# Patient Record
Sex: MALE | Race: WHITE | NOT HISPANIC OR LATINO
[De-identification: names, ages, dates, MRNs, and addresses within clinical notes are randomized per-mention and may not be internally consistent; named-entity substitution may affect disease eponyms.]

---

## 2019-10-03 ENCOUNTER — APPOINTMENT (OUTPATIENT)
Dept: CARDIOLOGY | Facility: CLINIC | Age: 84
End: 2019-10-03

## 2019-10-24 ENCOUNTER — APPOINTMENT (OUTPATIENT)
Dept: CARDIOLOGY | Facility: CLINIC | Age: 84
End: 2019-10-24

## 2020-12-13 ENCOUNTER — EMERGENCY (EMERGENCY)
Facility: HOSPITAL | Age: 85
LOS: 1 days | Discharge: HOME | End: 2020-12-13
Attending: EMERGENCY MEDICINE | Admitting: EMERGENCY MEDICINE
Payer: MEDICARE

## 2020-12-13 VITALS
RESPIRATION RATE: 15 BRPM | HEIGHT: 68 IN | DIASTOLIC BLOOD PRESSURE: 81 MMHG | OXYGEN SATURATION: 97 % | TEMPERATURE: 98 F | SYSTOLIC BLOOD PRESSURE: 140 MMHG | WEIGHT: 130.07 LBS | HEART RATE: 113 BPM

## 2020-12-13 VITALS
TEMPERATURE: 100 F | RESPIRATION RATE: 18 BRPM | HEART RATE: 107 BPM | SYSTOLIC BLOOD PRESSURE: 138 MMHG | DIASTOLIC BLOOD PRESSURE: 76 MMHG | OXYGEN SATURATION: 99 %

## 2020-12-13 PROCEDURE — 99283 EMERGENCY DEPT VISIT LOW MDM: CPT | Mod: CS

## 2020-12-13 RX ORDER — ACETAMINOPHEN 500 MG
650 TABLET ORAL ONCE
Refills: 0 | Status: COMPLETED | OUTPATIENT
Start: 2020-12-13 | End: 2020-12-13

## 2020-12-13 RX ADMIN — Medication 650 MILLIGRAM(S): at 06:16

## 2020-12-13 RX ADMIN — Medication 650 MILLIGRAM(S): at 06:46

## 2020-12-13 NOTE — ED ADULT NURSE NOTE - NSIMPLEMENTINTERV_GEN_ALL_ED
Implemented All Fall Risk Interventions:  [place] to call system. Call bell, personal items and telephone within reach. Instruct patient to call for assistance. Room bathroom lighting operational. Non-slip footwear when patient is off stretcher. Physically safe environment: no spills, clutter or unnecessary equipment. Stretcher in lowest position, wheels locked, appropriate side rails in place. Provide visual cue, wrist band, yellow gown, etc. Monitor gait and stability. Monitor for mental status changes and reorient to person, place, and time. Review medications for side effects contributing to fall risk. Reinforce activity limits and safety measures with patient and family.

## 2020-12-13 NOTE — ED PROVIDER NOTE - PATIENT PORTAL LINK FT
You can access the FollowMyHealth Patient Portal offered by [facility] by registering at the following website: [email or web address]. By joining [facility]’s FollowMyHealth portal, you will also be able to view your health information using other applications (apps) compatible with our system.

## 2020-12-13 NOTE — ED PROVIDER NOTE - OBJECTIVE STATEMENT
89yo male bib ems s/p fall. pt was walking to the kitchen and slipped and fell on his buttocks no head trauma no LOC< pt denies any pain, pt states his wife came home from rehab and the entire family has not tested positive for covid, pt only c/o nasal congestion, no sob, no fever, no cough, no other complaints, pt ambulates with a cane at home

## 2020-12-13 NOTE — ED ADULT NURSE NOTE - OBJECTIVE STATEMENT
Patient from home BIBEMS c/o slip and fall at home on the kitchen floor , pt denies pain, denies hitting head, denies LOC. Pt is Covid+, 101.9 rectal temperature. pt denies dizziness.  No abrasion/deformity noted upon assessment.  Upon assessment blanchable redness on bilateral gluteal fold noted.

## 2020-12-13 NOTE — ED ADULT NURSE REASSESSMENT NOTE - NS ED NURSE REASSESS COMMENT FT1
patient agreed to voucher cab, pt states he feels better.
patient ambulated in room with cane and 1 assist, pt ambulated well no falling/loss of balance.
Family made aware of pt's D/C.  Awaiting his return.  Daughter/wife.   Pt dressed in hospital scrubs.  D/C via taxi that was waiting during report.

## 2020-12-13 NOTE — ED PROVIDER NOTE - NSFOLLOWUPINSTRUCTIONS_ED_ALL_ED_FT
Fall Prevention    WHAT YOU NEED TO KNOW:    Fall prevention includes ways to make your home and other areas safer. It also includes ways you can move more carefully to prevent a fall. Health conditions that cause changes in your blood pressure, vision, or muscle strength and coordination may increase your risk for falls. Medicines may also increase your risk for falls if they make you dizzy, weak, or sleepy.    DISCHARGE INSTRUCTIONS:    Call 911 or have someone else call if:   •You have fallen and are unconscious.      •You have fallen and cannot move part of your body.      Contact your healthcare provider if:   •You have fallen and have pain or a headache.      •You have questions or concerns about your condition or care.      Fall prevention tips:   •Stand or sit up slowly. This may help you keep your balance and prevent falls.      •Use assistive devices as directed. Your healthcare provider may suggest that you use a cane or walker to help you keep your balance. You may need to have grab bars put in your bathroom near the toilet or in the shower.      •Wear shoes that fit well and have soles that . Wear shoes both inside and outside. Use slippers with good . Do not wear shoes with high heels.      •Wear a personal alarm. This is a device that allows you to call 911 if you fall and need help. Ask your healthcare provider for more information.      •Stay active. Exercise can help strengthen your muscles and improve your balance. Your healthcare provider may recommend water aerobics or walking. He or she may also recommend physical therapy to improve your coordination. Never start an exercise program without talking to your healthcare provider first.  Walking for Exercise           •Manage your medical conditions.  Keep all appointments with your healthcare providers. Visit your eye doctor as directed.      Home safety tips:     Fall Prevention for Adults     •Add items to prevent falls in the bathroom. Put nonslip strips on your bath or shower floor to prevent you from slipping. Use a bath mat if you do not have carpet in the bathroom. This will prevent you from falling when you step out of the bath or shower. Use a shower seat so you do not need to stand while you shower. Sit on the toilet or a chair in your bathroom to dry yourself and put on clothing. This will prevent you from losing your balance from drying or dressing yourself while you are standing.      •Keep paths clear. Remove books, shoes, and other objects from walkways and stairs. Place cords for telephones and lamps out of the way so that you do not need to walk over them. Tape them down if you cannot move them. Remove small rugs. If you cannot remove a rug, secure it with double-sided tape. This will prevent you from tripping.      •Install bright lights in your home. Use night lights to help light paths to the bathroom or kitchen. Always turn on the light before you start walking.      •Keep items you use often on shelves within reach. Do not use a step stool to help you reach an item.      •Paint or place reflective tape on the edges of your stairs. This will help you see the stairs better.      Follow up with your healthcare provider as directed: Write down your questions so you remember to ask them during your visits

## 2020-12-14 ENCOUNTER — INPATIENT (INPATIENT)
Facility: HOSPITAL | Age: 85
LOS: 12 days | DRG: 177 | End: 2020-12-27
Attending: FAMILY MEDICINE | Admitting: FAMILY MEDICINE
Payer: MEDICARE

## 2020-12-14 VITALS
OXYGEN SATURATION: 97 % | DIASTOLIC BLOOD PRESSURE: 83 MMHG | HEART RATE: 97 BPM | HEIGHT: 68 IN | SYSTOLIC BLOOD PRESSURE: 159 MMHG | TEMPERATURE: 99 F | RESPIRATION RATE: 20 BRPM

## 2020-12-14 DIAGNOSIS — E78.5 HYPERLIPIDEMIA, UNSPECIFIED: ICD-10-CM

## 2020-12-14 DIAGNOSIS — E11.69 TYPE 2 DIABETES MELLITUS WITH OTHER SPECIFIED COMPLICATION: ICD-10-CM

## 2020-12-14 DIAGNOSIS — M10.9 GOUT, UNSPECIFIED: ICD-10-CM

## 2020-12-14 DIAGNOSIS — U07.1 COVID-19: ICD-10-CM

## 2020-12-14 DIAGNOSIS — I10 ESSENTIAL (PRIMARY) HYPERTENSION: ICD-10-CM

## 2020-12-14 LAB
ALBUMIN SERPL ELPH-MCNC: 3 G/DL — LOW (ref 3.3–5)
ALP SERPL-CCNC: 129 U/L — HIGH (ref 40–120)
ALT FLD-CCNC: 28 U/L — SIGNIFICANT CHANGE UP (ref 12–78)
ANION GAP SERPL CALC-SCNC: 12 MMOL/L — SIGNIFICANT CHANGE UP (ref 5–17)
APPEARANCE UR: CLEAR — SIGNIFICANT CHANGE UP
APTT BLD: 30 SEC — SIGNIFICANT CHANGE UP (ref 27.5–35.5)
AST SERPL-CCNC: 28 U/L — SIGNIFICANT CHANGE UP (ref 15–37)
BACTERIA # UR AUTO: ABNORMAL
BASOPHILS # BLD AUTO: 0.01 K/UL — SIGNIFICANT CHANGE UP (ref 0–0.2)
BASOPHILS NFR BLD AUTO: 0.2 % — SIGNIFICANT CHANGE UP (ref 0–2)
BILIRUB SERPL-MCNC: 1.4 MG/DL — HIGH (ref 0.2–1.2)
BILIRUB UR-MCNC: NEGATIVE — SIGNIFICANT CHANGE UP
BUN SERPL-MCNC: 22 MG/DL — SIGNIFICANT CHANGE UP (ref 7–23)
CALCIUM SERPL-MCNC: 8.5 MG/DL — SIGNIFICANT CHANGE UP (ref 8.5–10.1)
CHLORIDE SERPL-SCNC: 102 MMOL/L — SIGNIFICANT CHANGE UP (ref 96–108)
CK SERPL-CCNC: 273 U/L — SIGNIFICANT CHANGE UP (ref 26–308)
CO2 SERPL-SCNC: 23 MMOL/L — SIGNIFICANT CHANGE UP (ref 22–31)
COLOR SPEC: YELLOW — SIGNIFICANT CHANGE UP
CREAT SERPL-MCNC: 1.4 MG/DL — HIGH (ref 0.5–1.3)
CRP SERPL-MCNC: 4.96 MG/DL — HIGH (ref 0–0.4)
D DIMER BLD IA.RAPID-MCNC: 3136 NG/ML DDU — HIGH
DIFF PNL FLD: ABNORMAL
EOSINOPHIL # BLD AUTO: 0 K/UL — SIGNIFICANT CHANGE UP (ref 0–0.5)
EOSINOPHIL NFR BLD AUTO: 0 % — SIGNIFICANT CHANGE UP (ref 0–6)
EPI CELLS # UR: SIGNIFICANT CHANGE UP
FERRITIN SERPL-MCNC: 640 NG/ML — HIGH (ref 30–400)
FIBRINOGEN PPP-MCNC: 770 MG/DL — HIGH (ref 290–520)
GLUCOSE SERPL-MCNC: 139 MG/DL — HIGH (ref 70–99)
GLUCOSE UR QL: NEGATIVE — SIGNIFICANT CHANGE UP
HCT VFR BLD CALC: 41 % — SIGNIFICANT CHANGE UP (ref 39–50)
HGB BLD-MCNC: 14.2 G/DL — SIGNIFICANT CHANGE UP (ref 13–17)
IMM GRANULOCYTES NFR BLD AUTO: 0.5 % — SIGNIFICANT CHANGE UP (ref 0–1.5)
INR BLD: 1.06 RATIO — SIGNIFICANT CHANGE UP (ref 0.88–1.16)
KETONES UR-MCNC: ABNORMAL
LACTATE SERPL-SCNC: 2.2 MMOL/L — HIGH (ref 0.7–2)
LDH SERPL L TO P-CCNC: 285 U/L — HIGH (ref 50–242)
LEUKOCYTE ESTERASE UR-ACNC: NEGATIVE — SIGNIFICANT CHANGE UP
LYMPHOCYTES # BLD AUTO: 0.46 K/UL — LOW (ref 1–3.3)
LYMPHOCYTES # BLD AUTO: 11.1 % — LOW (ref 13–44)
MCHC RBC-ENTMCNC: 31.3 PG — SIGNIFICANT CHANGE UP (ref 27–34)
MCHC RBC-ENTMCNC: 34.6 GM/DL — SIGNIFICANT CHANGE UP (ref 32–36)
MCV RBC AUTO: 90.3 FL — SIGNIFICANT CHANGE UP (ref 80–100)
MONOCYTES # BLD AUTO: 0.18 K/UL — SIGNIFICANT CHANGE UP (ref 0–0.9)
MONOCYTES NFR BLD AUTO: 4.3 % — SIGNIFICANT CHANGE UP (ref 2–14)
NEUTROPHILS # BLD AUTO: 3.47 K/UL — SIGNIFICANT CHANGE UP (ref 1.8–7.4)
NEUTROPHILS NFR BLD AUTO: 83.9 % — HIGH (ref 43–77)
NITRITE UR-MCNC: NEGATIVE — SIGNIFICANT CHANGE UP
NRBC # BLD: 0 /100 WBCS — SIGNIFICANT CHANGE UP (ref 0–0)
NT-PROBNP SERPL-SCNC: 1855 PG/ML — HIGH (ref 0–450)
PH UR: 5 — SIGNIFICANT CHANGE UP (ref 5–8)
PLATELET # BLD AUTO: 93 K/UL — LOW (ref 150–400)
POTASSIUM SERPL-MCNC: 3.8 MMOL/L — SIGNIFICANT CHANGE UP (ref 3.5–5.3)
POTASSIUM SERPL-SCNC: 3.8 MMOL/L — SIGNIFICANT CHANGE UP (ref 3.5–5.3)
PROCALCITONIN SERPL-MCNC: 0.13 NG/ML — HIGH (ref 0–0.04)
PROT SERPL-MCNC: 6.8 G/DL — SIGNIFICANT CHANGE UP (ref 6–8.3)
PROT UR-MCNC: 30 MG/DL
PROTHROM AB SERPL-ACNC: 12.4 SEC — SIGNIFICANT CHANGE UP (ref 10.6–13.6)
RBC # BLD: 4.54 M/UL — SIGNIFICANT CHANGE UP (ref 4.2–5.8)
RBC # FLD: 12.9 % — SIGNIFICANT CHANGE UP (ref 10.3–14.5)
RBC CASTS # UR COMP ASSIST: SIGNIFICANT CHANGE UP /HPF (ref 0–4)
SARS-COV-2 RNA SPEC QL NAA+PROBE: DETECTED
SODIUM SERPL-SCNC: 137 MMOL/L — SIGNIFICANT CHANGE UP (ref 135–145)
SP GR SPEC: 1.02 — SIGNIFICANT CHANGE UP (ref 1.01–1.02)
TROPONIN I SERPL-MCNC: 0.01 NG/ML — SIGNIFICANT CHANGE UP (ref 0.01–0.04)
UROBILINOGEN FLD QL: 4
WBC # BLD: 4.14 K/UL — SIGNIFICANT CHANGE UP (ref 3.8–10.5)
WBC # FLD AUTO: 4.14 K/UL — SIGNIFICANT CHANGE UP (ref 3.8–10.5)
WBC UR QL: SIGNIFICANT CHANGE UP

## 2020-12-14 PROCEDURE — 99284 EMERGENCY DEPT VISIT MOD MDM: CPT | Mod: CS

## 2020-12-14 PROCEDURE — 71045 X-RAY EXAM CHEST 1 VIEW: CPT | Mod: 26

## 2020-12-14 RX ORDER — ACETAMINOPHEN 500 MG
650 TABLET ORAL EVERY 6 HOURS
Refills: 0 | Status: DISCONTINUED | OUTPATIENT
Start: 2020-12-14 | End: 2020-12-20

## 2020-12-14 RX ORDER — ALLOPURINOL 300 MG
100 TABLET ORAL DAILY
Refills: 0 | Status: DISCONTINUED | OUTPATIENT
Start: 2020-12-14 | End: 2020-12-20

## 2020-12-14 RX ORDER — ENOXAPARIN SODIUM 100 MG/ML
40 INJECTION SUBCUTANEOUS EVERY 12 HOURS
Refills: 0 | Status: DISCONTINUED | OUTPATIENT
Start: 2020-12-14 | End: 2020-12-14

## 2020-12-14 RX ORDER — MEMANTINE HYDROCHLORIDE 10 MG/1
5 TABLET ORAL DAILY
Refills: 0 | Status: DISCONTINUED | OUTPATIENT
Start: 2020-12-14 | End: 2020-12-20

## 2020-12-14 RX ORDER — SODIUM CHLORIDE 9 MG/ML
1000 INJECTION, SOLUTION INTRAVENOUS
Refills: 0 | Status: DISCONTINUED | OUTPATIENT
Start: 2020-12-14 | End: 2020-12-27

## 2020-12-14 RX ORDER — SODIUM CHLORIDE 9 MG/ML
1000 INJECTION INTRAMUSCULAR; INTRAVENOUS; SUBCUTANEOUS ONCE
Refills: 0 | Status: COMPLETED | OUTPATIENT
Start: 2020-12-14 | End: 2020-12-14

## 2020-12-14 RX ORDER — DEXTROSE 50 % IN WATER 50 %
25 SYRINGE (ML) INTRAVENOUS ONCE
Refills: 0 | Status: DISCONTINUED | OUTPATIENT
Start: 2020-12-14 | End: 2020-12-27

## 2020-12-14 RX ORDER — MEMANTINE HYDROCHLORIDE 10 MG/1
1 TABLET ORAL
Qty: 0 | Refills: 0 | DISCHARGE

## 2020-12-14 RX ORDER — DEXTROSE 50 % IN WATER 50 %
12.5 SYRINGE (ML) INTRAVENOUS ONCE
Refills: 0 | Status: DISCONTINUED | OUTPATIENT
Start: 2020-12-14 | End: 2020-12-27

## 2020-12-14 RX ORDER — ALLOPURINOL 300 MG
1 TABLET ORAL
Qty: 0 | Refills: 0 | DISCHARGE

## 2020-12-14 RX ORDER — INSULIN LISPRO 100/ML
VIAL (ML) SUBCUTANEOUS AT BEDTIME
Refills: 0 | Status: DISCONTINUED | OUTPATIENT
Start: 2020-12-14 | End: 2020-12-20

## 2020-12-14 RX ORDER — GLUCAGON INJECTION, SOLUTION 0.5 MG/.1ML
1 INJECTION, SOLUTION SUBCUTANEOUS ONCE
Refills: 0 | Status: DISCONTINUED | OUTPATIENT
Start: 2020-12-14 | End: 2020-12-27

## 2020-12-14 RX ORDER — METFORMIN HYDROCHLORIDE 850 MG/1
1 TABLET ORAL
Qty: 0 | Refills: 0 | DISCHARGE

## 2020-12-14 RX ORDER — ATENOLOL 25 MG/1
25 TABLET ORAL DAILY
Refills: 0 | Status: DISCONTINUED | OUTPATIENT
Start: 2020-12-14 | End: 2020-12-20

## 2020-12-14 RX ORDER — ENOXAPARIN SODIUM 100 MG/ML
40 INJECTION SUBCUTANEOUS DAILY
Refills: 0 | Status: DISCONTINUED | OUTPATIENT
Start: 2020-12-15 | End: 2020-12-20

## 2020-12-14 RX ORDER — ATORVASTATIN CALCIUM 80 MG/1
10 TABLET, FILM COATED ORAL AT BEDTIME
Refills: 0 | Status: DISCONTINUED | OUTPATIENT
Start: 2020-12-14 | End: 2020-12-20

## 2020-12-14 RX ORDER — DEXAMETHASONE 0.5 MG/5ML
6 ELIXIR ORAL DAILY
Refills: 0 | Status: DISCONTINUED | OUTPATIENT
Start: 2020-12-14 | End: 2020-12-15

## 2020-12-14 RX ORDER — SODIUM CHLORIDE 9 MG/ML
1000 INJECTION INTRAMUSCULAR; INTRAVENOUS; SUBCUTANEOUS
Refills: 0 | Status: DISCONTINUED | OUTPATIENT
Start: 2020-12-14 | End: 2020-12-16

## 2020-12-14 RX ORDER — INSULIN LISPRO 100/ML
VIAL (ML) SUBCUTANEOUS
Refills: 0 | Status: DISCONTINUED | OUTPATIENT
Start: 2020-12-14 | End: 2020-12-20

## 2020-12-14 RX ORDER — DEXTROSE 50 % IN WATER 50 %
15 SYRINGE (ML) INTRAVENOUS ONCE
Refills: 0 | Status: DISCONTINUED | OUTPATIENT
Start: 2020-12-14 | End: 2020-12-27

## 2020-12-14 RX ORDER — ATORVASTATIN CALCIUM 80 MG/1
1 TABLET, FILM COATED ORAL
Qty: 0 | Refills: 0 | DISCHARGE

## 2020-12-14 RX ORDER — ATENOLOL 25 MG/1
1 TABLET ORAL
Qty: 0 | Refills: 0 | DISCHARGE

## 2020-12-14 RX ORDER — ONDANSETRON 8 MG/1
4 TABLET, FILM COATED ORAL EVERY 6 HOURS
Refills: 0 | Status: DISCONTINUED | OUTPATIENT
Start: 2020-12-14 | End: 2020-12-27

## 2020-12-14 RX ADMIN — SODIUM CHLORIDE 1000 MILLILITER(S): 9 INJECTION INTRAMUSCULAR; INTRAVENOUS; SUBCUTANEOUS at 11:00

## 2020-12-14 RX ADMIN — Medication 650 MILLIGRAM(S): at 18:07

## 2020-12-14 RX ADMIN — SODIUM CHLORIDE 50 MILLILITER(S): 9 INJECTION INTRAMUSCULAR; INTRAVENOUS; SUBCUTANEOUS at 22:44

## 2020-12-14 RX ADMIN — ENOXAPARIN SODIUM 40 MILLIGRAM(S): 100 INJECTION SUBCUTANEOUS at 18:07

## 2020-12-14 RX ADMIN — SODIUM CHLORIDE 50 MILLILITER(S): 9 INJECTION INTRAMUSCULAR; INTRAVENOUS; SUBCUTANEOUS at 18:08

## 2020-12-14 NOTE — ED ADULT NURSE NOTE - OBJECTIVE STATEMENT
patient comes to ED BIBA from home sent for evaluation of weakness pt is awake alert oriented X3 pt is COVID+ seen this ED yesterday and discharged pt in no respiratory distress

## 2020-12-14 NOTE — H&P ADULT - HISTORY OF PRESENT ILLNESS
87 yo M with HTN DM HLD dementia (walks with cane) who p/w gen weakness x past several days. Patient has not been eating or drinking for the past couple of days. He started having fevers and chills today 101.4. Patient's wife returned home from [facility] on 11/30. The daughter, who lives in the same house, works in a school. They all started to get sick after after wife got home on 11/30.   Pt was recently diagnosed with COVID as mult members in family have the same. Pt sent to hospital as famly can no longer care for this patient at home - pt was seen in ed yesterday for fall -- no acute findings -- and was sent home. Pt may have slid off chair today - no inj. Pt denies complaints. No abd pain. No acute dyspnea. No other acute co.

## 2020-12-14 NOTE — ED PROVIDER NOTE - CHPI ED SYMPTOMS NEG
no rash/no abdominal pain/no decreased eating/drinking/no diarrhea/no headache/no shortness of breath

## 2020-12-14 NOTE — ED PROVIDER NOTE - OBJECTIVE STATEMENT
87 yo M p/w gen weakness x past several days. Pt was recently diag with COVID as mult members in family have the same. Pt sent to hospital as famly can no longer care for this patient at home - pt was seen in ed 2 days ago for fall. no acute findings. Pt may have slid of fchair today - no inj. Pt denies complaints, no fever/chills now. No abd pain. no acute dyspnea. No other acute co.

## 2020-12-14 NOTE — CONSULT NOTE ADULT - SUBJECTIVE AND OBJECTIVE BOX
[facility] DIVISION of  INFECTIOUS DISEASE  Denver Lew MD PhD, Maxine Rodriguez MD, Anahy Syed MD, Jelena Regalado MD  and providing coverage with Katherine Martinez MD and Mala Bray MD  Providing Infectious Disease Consultations at [facility], [facility], [facility]'s    Office# [phone] to schedule follow up appointments  Answering Service for urgent calls or New Consults [phone]  Cell# to text for urgent issues Denver Vipin [phone]     HPI:  87 yo M with HTN DM HLD dementia (walks with cane) who p/w gen weakness x past several days. Patient has not been eating or drinking for the past couple of days. He started having fevers and chills today 101.4. Patient's wife returned home from [facility] on . The daughter, who lives in the same house, works in a school. They all started to get sick after after wife got home on .   Pt was recently diagnosed with COVID as mult members in family have the same. Pt sent to hospital as famly can no longer care for this patient at home - pt was seen in ed yesterday for fall -- no acute findings -- and was sent home. Pt may have slid off chair today - no inj. Pt denies complaints. No abd pain. No acute dyspnea. No other acute co.        PAST MEDICAL & SURGICAL HISTORY:  Gout  Hyperlipidemia  Hypertension      Antimicrobials      Immunological      Other      Allergies    penicillins (Unknown)  sulfa drugs (Unknown)    Intolerances        SOCIAL HISTORY:  no toxic habits reported      FAMILY HISTORY: noncontrib      ROS:    limited    Vital Signs Last 24 Hrs  T(C): 37.3 (14 Dec 2020 10:27), Max: 37.3 (14 Dec 2020 10:27)  T(F): 99.2 (14 Dec 2020 10:27), Max: 99.2 (14 Dec 2020 10:27)  HR: 97 (14 Dec 2020 10:27) (97 - 97)  BP: 159/83 (14 Dec 2020 10:27) (159/83 - 159/83)  BP(mean): --  RR: 20 (14 Dec 2020 10:27) (20 - 20)  SpO2: 97% (14 Dec 2020 10:27) (97% - 97%)    PE:    HEENT:  NC, atraumatic  Lungs:  No accessory muscle use, breathing comfortably  Cor:  distant  Abd:  Symmetric, appears normal,   Extrem:  No cyanosis      LABS:                        14.2   4.14  )-----------( 93       ( 14 Dec 2020 12:44 )             41.0       WBC Count: 4.14 K/uL (20 @ 12:44)          137  |  102  |  22  ----------------------------<  139<H>  3.8   |  23  |  1.40<H>    Ca    8.5      14 Dec 2020 12:44    TPro  6.8  /  Alb  3.0<L>  /  TBili  1.4<H>  /  DBili  x   /  AST  28  /  ALT  28  /  AlkPhos  129<H>        Creatinine, Serum: 1.40 mg/dL (20 @ 12:44)      Urinalysis Basic - ( 14 Dec 2020 12:44 )    Color: Yellow / Appearance: Clear / S.020 / pH: x  Gluc: x / Ketone: Small  / Bili: Negative / Urobili: 4   Blood: x / Protein: 30 mg/dL / Nitrite: Negative   Leuk Esterase: Negative / RBC: 0-2 /HPF / WBC 0-2   Sq Epi: x / Non Sq Epi: Occasional / Bacteria: Occasional    eGFR if Non African American: 45. mL/min/1.73M2 (20 @ 12:44)        COVID RISK SCORE:  Auto Neutrophil #: 3.47 K/uL (20 @ 12:44)  Auto Lymphocyte #: 0.46 K/uL (20 @ 12:44)    Lactate, Blood: 2.2 mmol/L (20 @ 12:44)    Auto Eosinophil #: 0.00 K/uL (20 @ 12:44)        Procalcitonin, Serum: 0.13 ng/mL (20 @ 12:44)    Troponin I, Serum: .015 ng/mL (20 @ 12:44)    Creatine Kinase, Serum: 273 U/L (20 @ 12:44)      Activated Partial Thromboplastin Time: 30.0 sec (20 @ 12:44)  INR: 1.06 ratio (20 @ 12:44)    D-Dimer Assay, Quantitative: 3136 ng/mL DDU (20 @ 12:44)        MICROBIOLOGY:        RADIOLOGY & ADDITIONAL STUDIES:    --< from: Xray Chest 1 View- PORTABLE-Urgent (20 @ 12:55) >  EXAM:  XR CHEST PORTABLE URGENT 1V                            PROCEDURE DATE:  2020          INTERPRETATION:  DATE OF STUDY: 2020    PRIOR:No recent chest films for comparison    CLINICAL INDICATION: Weakness. Cough and fever. Covid+.    TECHNIQUE: portable chest - done upright.    FINDINGS:  Thoracic aortic atheromatous changes and ectasia are noted.  The cardiomediastinal silhouette is within normal limits.  Chronic, bilateral calcified pleural plaques are present.  Increased reticular markings and patchiness seen in both lower lungs - left more than right - likely due to Covid pneumonia.  No large pleural effusion.  No pneumothorax.  There are degenerative changes of the thoracic spine.    IMPRESSION:  Findings consistent with Covid pneumonia.

## 2020-12-14 NOTE — PHARMACOTHERAPY INTERVENTION NOTE - COMMENTS
Patient scheduled to receive Lovenox 40 mg BID for COVID-19. BMI = 25.9 Patient scheduled to receive Lovenox 40 mg BID for COVID-19. For patient's with BMI < 30 kG/m2, recommendation is Lovenox 40 mg daily. Reached out to MD (Dr. Watt), given BMI = 25.9 kG/m2, recommended dose optimization. Patient received a dose of Lovenox 40 mg at 1807. MD approved order adjustments and order for Lovenox 40 mg BID was discontinued and new order entered for Lovenox 40 mg daily starting 12/15 was entered.

## 2020-12-14 NOTE — ED PROVIDER NOTE - RECENT EXPOSURE TO
Arrived to the [facility] via [facility]. 2units PRBC's completed. Patient tolerated well. Any issues or concerns during appointment: no.  Patient aware of next infusion appointment on 1/22 with Dr Phyllis Dai. Discharged to home via [facility].
none known
No

## 2020-12-14 NOTE — ED ADULT TRIAGE NOTE - CHIEF COMPLAINT QUOTE
Patient from home BIBEMS for weakness. Known COVID+. Per EMS, daughter states both parents have covid at this time. When she found him 'slumped over' she called EMS.

## 2020-12-14 NOTE — ED PROVIDER NOTE - CLINICAL SUMMARY MEDICAL DECISION MAKING FREE TEXT BOX
Covid positive with no acute dyspnea with O2 sat in mid 90's. pt unable to be cared for , will need  admission, possible rehab

## 2020-12-14 NOTE — CONSULT NOTE ADULT - ASSESSMENT
89 yo M with HTN DM HLD dementia (walks with cane) who p/w gen weakness x past several days. fever, recently diagnosed with COVID sent to hospital as family can no longer care for this patient at home -     RECOMMENDATIONS  12/14 NLR 3.47/0.46=7.5 97% on RA, would NOT start steroid as pt sats fine, rec LMWH prophylactic dose, with no hypoxemia and unclear duration rec NO remdesivir    COVID-19-high risk for decompensation EARLY INFLAMMATORY PHASE (7-14 days post symptom onset),    REMDESIVIR-5 day course consider within 10 days of symptom onset and prior to need for high flow oxygen or mechanical ventilation, Criteria for use include:• SpO2 < 94% on room air, or requiring supplemental oxygen • eGFR > 30 mL/min • ALT and AST < 5X ULN -weak evidence supporting minimal benefit  STEROIDs recommended AFTER 1st week of symptom onset for any patients that are hypoxic  and  with dexamethasone 6mg  Q-day x 10 days (equivalent to solumedrol 32mg IV or Prednisone 40mg)-higher doses to be considered in more severe disease,   ANTICOAGULATION- prophylactic dosing universally recommended LMWH 0.5mg/kg SQ Qday (adj for low GFR) and then full dose to be considered in patients with increased risk for thromboembolic complications if bleeding risks are considered acceptable - track efficacy and periodic D-dimers from day of increased oxygen requirement and for high risk patients consider discharge on oral anticoagulation with rivaroxaban (Xarelto) 10mg PO QD or Eliquis (apixaban) 2.5-5mg PO BID  x 6 weeks, ASA in some contexts.   TOCILIZUMAB role still under investigation (limited benefit without pretreatment with steroids) but may be considered for patients progressing after start of steroids (criteria per Chris: Ferritin>700, D-dimer >1,000, CRP increase by >30%) w some evidence to suggest reduced progression to mechanical ventilation and shortening of hospital stay, caution with AST/ALT >1.5 ULN, would avoid without steroids    -daily, BMP, CBC w diff to follow NLR and in some contexts daily or periodic D-dimer levels, -other labs to risk stratify or with any decompensation  Procalcitonin,  Ferritin,  CRP, ESR, PT/PTT but limit excessive testing -antibiotics only if there is concern for a bacterial process (noted to be an issue in only a minority on presentation,  (consider proning and tolerating lower oxygen saturation to avoid intubation).

## 2020-12-14 NOTE — H&P ADULT - PROBLEM SELECTOR PLAN 1
Admit  Start decadron  Remdisivir/Toci as per ID  Oxygen prn  Monitor COVID serologies  Airborne precautions  Supportive care  ID consult  Further work-up/management pending clinical course.

## 2020-12-15 LAB
-  COAGULASE NEGATIVE STAPHYLOCOCCUS: SIGNIFICANT CHANGE UP
-  STREPTOCOCCUS SP. (NOT GRP A, B OR S PNEUMONIAE): SIGNIFICANT CHANGE UP
A1C WITH ESTIMATED AVERAGE GLUCOSE RESULT: 6.8 % — HIGH (ref 4–5.6)
ALBUMIN SERPL ELPH-MCNC: 2.4 G/DL — LOW (ref 3.3–5)
ALP SERPL-CCNC: 109 U/L — SIGNIFICANT CHANGE UP (ref 40–120)
ALT FLD-CCNC: 23 U/L — SIGNIFICANT CHANGE UP (ref 12–78)
ANION GAP SERPL CALC-SCNC: 11 MMOL/L — SIGNIFICANT CHANGE UP (ref 5–17)
AST SERPL-CCNC: 27 U/L — SIGNIFICANT CHANGE UP (ref 15–37)
BASOPHILS # BLD AUTO: 0.01 K/UL — SIGNIFICANT CHANGE UP (ref 0–0.2)
BASOPHILS NFR BLD AUTO: 0.3 % — SIGNIFICANT CHANGE UP (ref 0–2)
BILIRUB SERPL-MCNC: 1.3 MG/DL — HIGH (ref 0.2–1.2)
BUN SERPL-MCNC: 20 MG/DL — SIGNIFICANT CHANGE UP (ref 7–23)
CALCIUM SERPL-MCNC: 7.9 MG/DL — LOW (ref 8.5–10.1)
CHLORIDE SERPL-SCNC: 106 MMOL/L — SIGNIFICANT CHANGE UP (ref 96–108)
CK SERPL-CCNC: 253 U/L — SIGNIFICANT CHANGE UP (ref 26–308)
CO2 SERPL-SCNC: 23 MMOL/L — SIGNIFICANT CHANGE UP (ref 22–31)
CREAT SERPL-MCNC: 1.3 MG/DL — SIGNIFICANT CHANGE UP (ref 0.5–1.3)
CRP SERPL-MCNC: 4.86 MG/DL — HIGH (ref 0–0.4)
D DIMER BLD IA.RAPID-MCNC: 931 NG/ML DDU — HIGH
EOSINOPHIL # BLD AUTO: 0 K/UL — SIGNIFICANT CHANGE UP (ref 0–0.5)
EOSINOPHIL NFR BLD AUTO: 0 % — SIGNIFICANT CHANGE UP (ref 0–6)
ERYTHROCYTE [SEDIMENTATION RATE] IN BLOOD: 25 MM/HR — HIGH (ref 0–20)
ESTIMATED AVERAGE GLUCOSE: 148 MG/DL — HIGH (ref 68–114)
FERRITIN SERPL-MCNC: 664 NG/ML — HIGH (ref 30–400)
GLUCOSE SERPL-MCNC: 92 MG/DL — SIGNIFICANT CHANGE UP (ref 70–99)
GRAM STN FLD: SIGNIFICANT CHANGE UP
HCT VFR BLD CALC: 34.5 % — LOW (ref 39–50)
HGB BLD-MCNC: 12.1 G/DL — LOW (ref 13–17)
IMM GRANULOCYTES NFR BLD AUTO: 0.9 % — SIGNIFICANT CHANGE UP (ref 0–1.5)
LACTATE SERPL-SCNC: 1.1 MMOL/L — SIGNIFICANT CHANGE UP (ref 0.7–2)
LDH SERPL L TO P-CCNC: 265 U/L — HIGH (ref 50–242)
LYMPHOCYTES # BLD AUTO: 0.57 K/UL — LOW (ref 1–3.3)
LYMPHOCYTES # BLD AUTO: 17.2 % — SIGNIFICANT CHANGE UP (ref 13–44)
MAGNESIUM SERPL-MCNC: 1.7 MG/DL — SIGNIFICANT CHANGE UP (ref 1.6–2.6)
MCHC RBC-ENTMCNC: 31.2 PG — SIGNIFICANT CHANGE UP (ref 27–34)
MCHC RBC-ENTMCNC: 35.1 GM/DL — SIGNIFICANT CHANGE UP (ref 32–36)
MCV RBC AUTO: 88.9 FL — SIGNIFICANT CHANGE UP (ref 80–100)
METHOD TYPE: SIGNIFICANT CHANGE UP
MONOCYTES # BLD AUTO: 0.14 K/UL — SIGNIFICANT CHANGE UP (ref 0–0.9)
MONOCYTES NFR BLD AUTO: 4.2 % — SIGNIFICANT CHANGE UP (ref 2–14)
NEUTROPHILS # BLD AUTO: 2.56 K/UL — SIGNIFICANT CHANGE UP (ref 1.8–7.4)
NEUTROPHILS NFR BLD AUTO: 77.4 % — HIGH (ref 43–77)
NRBC # BLD: 0 /100 WBCS — SIGNIFICANT CHANGE UP (ref 0–0)
PLATELET # BLD AUTO: 82 K/UL — LOW (ref 150–400)
POTASSIUM SERPL-MCNC: 3.5 MMOL/L — SIGNIFICANT CHANGE UP (ref 3.5–5.3)
POTASSIUM SERPL-SCNC: 3.5 MMOL/L — SIGNIFICANT CHANGE UP (ref 3.5–5.3)
PROCALCITONIN SERPL-MCNC: 0.13 NG/ML — HIGH (ref 0–0.04)
PROT SERPL-MCNC: 5.7 G/DL — LOW (ref 6–8.3)
RBC # BLD: 3.88 M/UL — LOW (ref 4.2–5.8)
RBC # FLD: 12.8 % — SIGNIFICANT CHANGE UP (ref 10.3–14.5)
SARS-COV-2 IGG SERPL QL IA: NEGATIVE — SIGNIFICANT CHANGE UP
SARS-COV-2 IGM SERPL IA-ACNC: <0.1 INDEX — SIGNIFICANT CHANGE UP
SODIUM SERPL-SCNC: 140 MMOL/L — SIGNIFICANT CHANGE UP (ref 135–145)
SPECIMEN SOURCE: SIGNIFICANT CHANGE UP
WBC # BLD: 3.31 K/UL — LOW (ref 3.8–10.5)
WBC # FLD AUTO: 3.31 K/UL — LOW (ref 3.8–10.5)

## 2020-12-15 RX ADMIN — Medication 100 MILLIGRAM(S): at 13:24

## 2020-12-15 RX ADMIN — Medication 650 MILLIGRAM(S): at 05:34

## 2020-12-15 RX ADMIN — Medication 1: at 17:35

## 2020-12-15 RX ADMIN — ATENOLOL 25 MILLIGRAM(S): 25 TABLET ORAL at 05:34

## 2020-12-15 RX ADMIN — Medication 6 MILLIGRAM(S): at 05:34

## 2020-12-15 RX ADMIN — ATORVASTATIN CALCIUM 10 MILLIGRAM(S): 80 TABLET, FILM COATED ORAL at 00:02

## 2020-12-15 RX ADMIN — ATORVASTATIN CALCIUM 10 MILLIGRAM(S): 80 TABLET, FILM COATED ORAL at 21:23

## 2020-12-15 RX ADMIN — Medication 650 MILLIGRAM(S): at 06:42

## 2020-12-15 RX ADMIN — MEMANTINE HYDROCHLORIDE 5 MILLIGRAM(S): 10 TABLET ORAL at 13:24

## 2020-12-15 NOTE — PROGRESS NOTE ADULT - ASSESSMENT
89 yo M with HTN DM HLD dementia (walks with cane) who p/w gen weakness x past several days. fever, recently diagnosed with COVID sent to hospital as family can no longer care for this patient at home -     RECOMMENDATIONS  12/14 NLR 3.47/0.46=7.5 97% on RA, would NOT start steroid as pt sats fine, rec LMWH prophylactic dose, with no hypoxemia and unclear duration rec NO remdesivir  12/15 agree with stopping steroids, continue LMWH

## 2020-12-15 NOTE — PROGRESS NOTE ADULT - SUBJECTIVE AND OBJECTIVE BOX
Patient is a 88y old  Male who presents with a chief complaint of weakness, covid (15 Dec 2020 11:21)      INTERVAL HPI/OVERNIGHT EVENTS: Patient seen and examined. NAD. No complaints.    Vital Signs Last 24 Hrs  T(C): 36.1 (15 Dec 2020 07:50), Max: 39.4 (15 Dec 2020 04:20)  T(F): 97 (15 Dec 2020 07:50), Max: 102.9 (15 Dec 2020 04:20)  HR: 74 (15 Dec 2020 07:50) (74 - 97)  BP: 100/51 (15 Dec 2020 07:50) (100/51 - 134/81)  BP(mean): --  RR: 19 (15 Dec 2020 07:50) (18 - 20)  SpO2: 94% (15 Dec 2020 07:50) (91% - 96%)    12-15    140  |  106  |  20  ----------------------------<  92  3.5   |  23  |  1.30    Ca    7.9<L>      15 Dec 2020 06:00  Mg     1.7     12-15    TPro  5.7<L>  /  Alb  2.4<L>  /  TBili  1.3<H>  /  DBili  x   /  AST  27  /  ALT  23  /  AlkPhos  109  12-15                          12.1   3.31  )-----------( 82       ( 15 Dec 2020 06:00 )             34.5     PT/INR - ( 14 Dec 2020 12:44 )   PT: 12.4 sec;   INR: 1.06 ratio         PTT - ( 14 Dec 2020 12:44 )  PTT:30.0 sec  CAPILLARY BLOOD GLUCOSE      POCT Blood Glucose.: 144 mg/dL (15 Dec 2020 12:12)  POCT Blood Glucose.: 104 mg/dL (15 Dec 2020 08:42)  POCT Blood Glucose.: 99 mg/dL (14 Dec 2020 22:28)    Urinalysis Basic - ( 14 Dec 2020 12:44 )    Color: Yellow / Appearance: Clear / S.020 / pH: x  Gluc: x / Ketone: Small  / Bili: Negative / Urobili: 4   Blood: x / Protein: 30 mg/dL / Nitrite: Negative   Leuk Esterase: Negative / RBC: 0-2 /HPF / WBC 0-2   Sq Epi: x / Non Sq Epi: Occasional / Bacteria: Occasional              acetaminophen   Tablet .. 650 milliGRAM(s) Oral every 6 hours PRN  allopurinol 100 milliGRAM(s) Oral daily  ATENolol  Tablet 25 milliGRAM(s) Oral daily  atorvastatin 10 milliGRAM(s) Oral at bedtime  dextrose 40% Gel 15 Gram(s) Oral once  dextrose 5%. 1000 milliLiter(s) IV Continuous <Continuous>  dextrose 5%. 1000 milliLiter(s) IV Continuous <Continuous>  dextrose 50% Injectable 25 Gram(s) IV Push once  dextrose 50% Injectable 12.5 Gram(s) IV Push once  dextrose 50% Injectable 25 Gram(s) IV Push once  enoxaparin Injectable 40 milliGRAM(s) SubCutaneous daily  glucagon  Injectable 1 milliGRAM(s) IntraMuscular once  insulin lispro (ADMELOG) corrective regimen sliding scale   SubCutaneous three times a day before meals  insulin lispro (ADMELOG) corrective regimen sliding scale   SubCutaneous at bedtime  memantine 5 milliGRAM(s) Oral daily  ondansetron Injectable 4 milliGRAM(s) IV Push every 6 hours PRN  sodium chloride 0.9%. 1000 milliLiter(s) IV Continuous <Continuous>              REVIEW OF SYSTEMS:  CONSTITUTIONAL: + fever, no weight loss, or no fatigue  NECK: No pain, no stiffness  RESPIRATORY: No cough, no wheezing, no chills, no hemoptysis, No shortness of breath  CARDIOVASCULAR: No chest pain, no palpitations, no dizziness, no leg swelling  GASTROINTESTINAL: No abdominal pain. No nausea, no vomiting, no hematemesis; No diarrhea, no constipation. No melena, no hematochezia.  GENITOURINARY: No dysuria, no frequency, no hematuria, no incontinence  NEUROLOGICAL: No headaches, no loss of strength, no numbness, no tremors  SKIN: No itching, no burning  MUSCULOSKELETAL: No joint pain, no swelling; No muscle, no back, no extremity pain  PSYCHIATRIC: No depression, no mood swings,   HEME/LYMPH: No easy bruising, no bleeding gums  ALLERY AND IMMUNOLOGIC: No hives       Consultant(s) Notes Reviewed:  [X] YES  [ ] NO    PHYSICAL EXAM:  GENERAL: NAD  HEAD:  Atraumatic, Normocephalic  EYES: EOMI, PERRLA, conjunctiva and sclera clear  ENMT: No tonsillar erythema, exudates, or enlargement; Moist mucous membranes  NECK: Supple, No JVD  NERVOUS SYSTEM:  Awake & alert  CHEST/LUNG: Clear to auscultation bilaterally; No rales, rhonchi, wheezing,  HEART: Regular rate and rhythm  ABDOMEN: Soft, Nontender, Nondistended; Bowel sounds present  EXTREMITIES:  No clubbing, cyanosis, or edema  LYMPH: No lymphadenopathy noted  SKIN: No rashes      Advanced care planning discussed with patient/family [X] YES   [ ] NO    Advanced care planning discussed with patient/family. Patient's health status was discussed. All appropriate changes have been made regarding patient's end-of-life care. Advanced care planning forms reviewed/discussed/completed.  20 minutes spent.

## 2020-12-15 NOTE — PROGRESS NOTE ADULT - SUBJECTIVE AND OBJECTIVE BOX
[facility] DIVISION of INFECTIOUS DISEASE  Denver Lew MD PhD, Maxine Rodriguez MD, Anahy Syed MD, Jelena Regalado MD  and providing coverage with Katherine Martinez MD and Mala Bray MD  Providing Infectious Disease Consultations at [facility], [facility], [facility]'s      Office# [phone] to schedule follow up appointments  Answering Service for urgent calls or New Consults [phone]  Cell# to text for urgent issues Denver Lew [phone]     Infectious diseases progress note:    SPENCER LEVY is a 88y y. o. Male patient    COVID Patient    Allergies    penicillins (Unknown)  sulfa drugs (Unknown)    Intolerances        ANTIBIOTICS/RELEVANT:  antimicrobials    immunologic:    OTHER:  acetaminophen   Tablet .. 650 milliGRAM(s) Oral every 6 hours PRN  allopurinol 100 milliGRAM(s) Oral daily  ATENolol  Tablet 25 milliGRAM(s) Oral daily  atorvastatin 10 milliGRAM(s) Oral at bedtime  dexAMETHasone  Injectable 6 milliGRAM(s) IV Push daily  dextrose 40% Gel 15 Gram(s) Oral once  dextrose 5%. 1000 milliLiter(s) IV Continuous <Continuous>  dextrose 5%. 1000 milliLiter(s) IV Continuous <Continuous>  dextrose 50% Injectable 25 Gram(s) IV Push once  dextrose 50% Injectable 12.5 Gram(s) IV Push once  dextrose 50% Injectable 25 Gram(s) IV Push once  enoxaparin Injectable 40 milliGRAM(s) SubCutaneous daily  glucagon  Injectable 1 milliGRAM(s) IntraMuscular once  insulin lispro (ADMELOG) corrective regimen sliding scale   SubCutaneous three times a day before meals  insulin lispro (ADMELOG) corrective regimen sliding scale   SubCutaneous at bedtime  memantine 5 milliGRAM(s) Oral daily  ondansetron Injectable 4 milliGRAM(s) IV Push every 6 hours PRN  sodium chloride 0.9%. 1000 milliLiter(s) IV Continuous <Continuous>      Objective:  Vital Signs Last 24 Hrs  T(C): 36.1 (15 Dec 2020 07:50), Max: 39.4 (15 Dec 2020 04:20)  T(F): 97 (15 Dec 2020 07:50), Max: 102.9 (15 Dec 2020 04:20)  HR: 74 (15 Dec 2020 07:50) (74 - 97)  BP: 100/51 (15 Dec 2020 07:50) (100/51 - 134/81)  BP(mean): --  RR: 19 (15 Dec 2020 07:50) (18 - 20)  SpO2: 94% (15 Dec 2020 07:50) (91% - 96%)    T(C): 36.1 (12-15-20 @ 07:50), Max: 39.4 (12-15-20 @ 04:20)  T(C): 36.1 (12-15-20 @ 07:50), Max: 39.4 (12-15-20 @ 04:20)  T(C): 36.1 (12-15-20 @ 07:50), Max: 39.4 (12-15-20 @ 04:20)    PHYSICAL EXAM:  HEENT: NC atraumatic  Neck: supple  Respiratory: no accessory muscle use, breathing comfortably  Cardiovascular: distant  Gastrointestinal: normal appearing, nondistended  Extremities: no clubbing, no cyanosis,      LABS:                          12.1   3.31  )-----------( 82       ( 15 Dec 2020 06:00 )             34.5       3.31 12-15 @ 06:00  4.14  @ 12:44      12-15    140  |  106  |  20  ----------------------------<  92  3.5   |  23  |  1.30    Ca    7.9<L>      15 Dec 2020 06:00  Mg     1.7     12-15    TPro  5.7<L>  /  Alb  2.4<L>  /  TBili  1.3<H>  /  DBili  x   /  AST  27  /  ALT  23  /  AlkPhos  109  12-15      Creatinine, Serum: 1.30 mg/dL (12-15-20 @ 06:00)  Creatinine, Serum: 1.40 mg/dL (20 @ 12:44)      PT/INR - ( 14 Dec 2020 12:44 )   PT: 12.4 sec;   INR: 1.06 ratio         PTT - ( 14 Dec 2020 12:44 )  PTT:30.0 sec  Urinalysis Basic - ( 14 Dec 2020 12:44 )    Color: Yellow / Appearance: Clear / S.020 / pH: x  Gluc: x / Ketone: Small  / Bili: Negative / Urobili: 4   Blood: x / Protein: 30 mg/dL / Nitrite: Negative   Leuk Esterase: Negative / RBC: 0-2 /HPF / WBC 0-2   Sq Epi: x / Non Sq Epi: Occasional / Bacteria: Occasional            COVID RISK SCORE  Auto Neutrophil #: 2.56 K/uL (12-15-20 @ 06:00)  Auto Lymphocyte #: 0.57 K/uL (12-15-20 @ 06:00)  Auto Neutrophil #: 3.47 K/uL (20 @ 12:44)  Auto Lymphocyte #: 0.46 K/uL (20 @ 12:44)    Lactate, Blood: 1.1 mmol/L (12-15-20 @ 06:00)  Lactate, Blood: 2.2 mmol/L (20 @ 12:44)    Auto Eosinophil #: 0.00 K/uL (12-15-20 @ 06:00)  Auto Eosinophil #: 0.00 K/uL (20 @ 12:44)    Lactate Dehydrogenase, Serum: 265 U/L (12-15-20 @ 09:19)  Lactate Dehydrogenase, Serum: 285 U/L (20 @ 17:41)    Sedimentation Rate, Erythrocyte: 25 mm/hr (12-15-20 @ 06:00)    Procalcitonin, Serum: 0.13 ng/mL (12-15-20 @ 06:00)  Procalcitonin, Serum: 0.13 ng/mL (20 @ 12:44)    Troponin I, Serum: .015 ng/mL (20 @ 12:44)    Creatine Kinase, Serum: 253 U/L (12-15-20 @ 06:00)  Creatine Kinase, Serum: 273 U/L (20 @ 12:44)        Ferritin, Serum: 664 ng/mL (12-15-20 @ 09:17)  Ferritin, Serum: 640 ng/mL (20 @ 17:41)        Activated Partial Thromboplastin Time: 30.0 sec (20 @ 12:44)  INR: 1.06 ratio (20 @ 12:44)    D-Dimer Assay, Quantitative: 931 ng/mL DDU (12-15-20 @ 06:00)  D-Dimer Assay, Quantitative: 3136 ng/mL DDU (20 @ 12:44)        MICROBIOLOGY:              RADIOLOGY & ADDITIONAL STUDIES:

## 2020-12-15 NOTE — CONSULT NOTE ADULT - SUBJECTIVE AND OBJECTIVE BOX
Date/Time Patient Seen:  		  Referring MD:   Data Reviewed	       Patient is a 88y old  Male who presents with a chief complaint of weakness, covid (14 Dec 2020 17:06)      Subjective/HPI  h and p reviewed  er provider note reviewed  labs and imaging reviewed  cxr c/w Covid     History of Present Illness:  Reason for Admission: weakness, covid  History of Present Illness:   87 yo M with HTN DM HLD dementia (walks with cane) who p/w gen weakness x past several days. Patient has not been eating or drinking for the past couple of days. He started having fevers and chills today 101.4. Patient's wife returned home from [facility] on 11/30. The daughter, who lives in the same house, works in a school. They all started to get sick after after wife got home on 11/30.   Pt was recently diagnosed with COVID as mult members in family have the same. Pt sent to hospital as famly can no longer care for this patient at home - pt was seen in ed yesterday for fall -- no acute findings -- and was sent home. Pt may have slid off chair today - no inj. Pt denies complaints. No abd pain. No acute dyspnea. No other acute co.      · Chief Complaint: The patient is a 88y Male complaining of weakness.  · HPI Objective Statement: 87 yo M p/w gen weakness x past several days. Pt was recently diag with COVID as mult members in family have the same. Pt sent to hospital as famzoey can no longer care for this patient at home - pt was seen in ed 2 days ago for fall. no acute findings. Pt may have slid of fchair today - no inj. Pt denies complaints, no fever/chills now. No abd pain. no acute dyspnea. No other acute co.  · Negative Findings: no abdominal pain, no decreased eating/drinking, no diarrhea, no headache, no rash, no shortness of breath  · Timing: constant  · Duration: day(s)  · Quality: aching  · Severity: MILD  · Context: unknown  · Recent Exposure To: none known  · Relieving Factors: none    PAST MEDICAL & SURGICAL HISTORY:  Gout    Hyperlipidemia    Hypertension          Medication list         MEDICATIONS  (STANDING):  allopurinol 100 milliGRAM(s) Oral daily  ATENolol  Tablet 25 milliGRAM(s) Oral daily  atorvastatin 10 milliGRAM(s) Oral at bedtime  dexAMETHasone  Injectable 6 milliGRAM(s) IV Push daily  dextrose 40% Gel 15 Gram(s) Oral once  dextrose 5%. 1000 milliLiter(s) (50 mL/Hr) IV Continuous <Continuous>  dextrose 5%. 1000 milliLiter(s) (100 mL/Hr) IV Continuous <Continuous>  dextrose 50% Injectable 25 Gram(s) IV Push once  dextrose 50% Injectable 12.5 Gram(s) IV Push once  dextrose 50% Injectable 25 Gram(s) IV Push once  enoxaparin Injectable 40 milliGRAM(s) SubCutaneous daily  glucagon  Injectable 1 milliGRAM(s) IntraMuscular once  insulin lispro (ADMELOG) corrective regimen sliding scale   SubCutaneous three times a day before meals  insulin lispro (ADMELOG) corrective regimen sliding scale   SubCutaneous at bedtime  memantine 5 milliGRAM(s) Oral daily  sodium chloride 0.9%. 1000 milliLiter(s) (50 mL/Hr) IV Continuous <Continuous>    MEDICATIONS  (PRN):  acetaminophen   Tablet .. 650 milliGRAM(s) Oral every 6 hours PRN Temp greater or equal to 38C (100.4F), Mild Pain (1 - 3)  ondansetron Injectable 4 milliGRAM(s) IV Push every 6 hours PRN Nausea and/or Vomiting         Vitals log        ICU Vital Signs Last 24 Hrs  T(C): 36.1 (15 Dec 2020 07:50), Max: 39.4 (15 Dec 2020 04:20)  T(F): 97 (15 Dec 2020 07:50), Max: 102.9 (15 Dec 2020 04:20)  HR: 74 (15 Dec 2020 07:50) (74 - 97)  BP: 100/51 (15 Dec 2020 07:50) (100/51 - 159/83)  BP(mean): --  ABP: --  ABP(mean): --  RR: 19 (15 Dec 2020 07:50) (18 - 20)  SpO2: 94% (15 Dec 2020 07:50) (91% - 97%)           Input and Output:  I&O's Detail    14 Dec 2020 07:01  -  15 Dec 2020 07:00  --------------------------------------------------------  IN:    sodium chloride 0.9%: 450 mL  Total IN: 450 mL    OUT:    Voided (mL): 350 mL  Total OUT: 350 mL    Total NET: 100 mL          Lab Data                        12.1   3.31  )-----------( 82       ( 15 Dec 2020 06:00 )             34.5     12-15    140  |  106  |  20  ----------------------------<  92  3.5   |  23  |  1.30    Ca    7.9<L>      15 Dec 2020 06:00  Mg     1.7     12-15    TPro  5.7<L>  /  Alb  2.4<L>  /  TBili  1.3<H>  /  DBili  x   /  AST  27  /  ALT  23  /  AlkPhos  109  12-15      CARDIAC MARKERS ( 15 Dec 2020 06:00 )  x     / x     / 253 U/L / x     / x      CARDIAC MARKERS ( 14 Dec 2020 12:44 )  .015 ng/mL / x     / 273 U/L / x     / x            Review of Systems	    weakness  Objective     Physical Examination    heart s1s2  lung dec BS      Pertinent Lab findings & Imaging      Potter:  NO   Adequate UO     I&O's Detail    14 Dec 2020 07:01  -  15 Dec 2020 07:00  --------------------------------------------------------  IN:    sodium chloride 0.9%: 450 mL  Total IN: 450 mL    OUT:    Voided (mL): 350 mL  Total OUT: 350 mL    Total NET: 100 mL               Discussed with:     Cultures:	        Radiology        EXAM:  XR CHEST PORTABLE URGENT 1V                            PROCEDURE DATE:  12/14/2020          INTERPRETATION:  DATE OF STUDY: 12/14/2020    PRIOR:No recent chest films for comparison    CLINICAL INDICATION: Weakness. Cough and fever. Covid+.    TECHNIQUE: portable chest - done upright.    FINDINGS:  Thoracic aortic atheromatous changes and ectasia are noted.  The cardiomediastinal silhouette is within normal limits.  Chronic, bilateral calcified pleural plaques are present.  Increased reticular markings and patchiness seen in both lower lungs - left more than right - likely due to Covid pneumonia.  No large pleural effusion.  No pneumothorax.  There are degenerative changes of the thoracic spine.    IMPRESSION:  Findings consistent with Covid pneumonia.  This report to ER via PACs system.            MIRIAM BARNETT MD; Attending Radiologist  This document has been electronically signed. Dec 14 2020  3:05PM

## 2020-12-15 NOTE — CONSULT NOTE ADULT - PROBLEM SELECTOR RECOMMENDATION 9
87 yo M p/w gen weakness x past several days. Pt was recently diag with COVID as mult members in family have the same  on RA - decadron recommended for covid 19 with hypoxemia -   ID eval noted  proBNP elevated - monitor for vol overload  monitor VS and HD and Sat  oral and skin care - assist with needs and ADL  isolation precs  tylenol and robitussin PRN   DM care  DVT p
Thank you for consulting us and involving us in the management of this most interesting and challenging case. I certainly appreciate the challenges, stress and sacrifice during these difficult and challenging times.  Please call us at [phone] or text me directly on my cell# [phone] with any further questions or changes in clinical status for which ID input would be helpful

## 2020-12-15 NOTE — PHYSICAL THERAPY INITIAL EVALUATION ADULT - ADDITIONAL COMMENTS
Pt lives with his spouse and daughter in a house, no steps. pt ambulates independently with RW or SC and is independent with ADLs. As per SW, pt has aide at home.

## 2020-12-16 LAB
-  AMPICILLIN: SIGNIFICANT CHANGE UP
-  CIPROFLOXACIN: SIGNIFICANT CHANGE UP
-  LEVOFLOXACIN: SIGNIFICANT CHANGE UP
-  NITROFURANTOIN: SIGNIFICANT CHANGE UP
-  TETRACYCLINE: SIGNIFICANT CHANGE UP
-  VANCOMYCIN: SIGNIFICANT CHANGE UP
ALBUMIN SERPL ELPH-MCNC: 2.4 G/DL — LOW (ref 3.3–5)
ALP SERPL-CCNC: 108 U/L — SIGNIFICANT CHANGE UP (ref 40–120)
ALT FLD-CCNC: 24 U/L — SIGNIFICANT CHANGE UP (ref 12–78)
ANION GAP SERPL CALC-SCNC: 9 MMOL/L — SIGNIFICANT CHANGE UP (ref 5–17)
AST SERPL-CCNC: 33 U/L — SIGNIFICANT CHANGE UP (ref 15–37)
BASOPHILS # BLD AUTO: 0.01 K/UL — SIGNIFICANT CHANGE UP (ref 0–0.2)
BASOPHILS NFR BLD AUTO: 0.2 % — SIGNIFICANT CHANGE UP (ref 0–2)
BILIRUB SERPL-MCNC: 1.2 MG/DL — SIGNIFICANT CHANGE UP (ref 0.2–1.2)
BUN SERPL-MCNC: 27 MG/DL — HIGH (ref 7–23)
CALCIUM SERPL-MCNC: 7.8 MG/DL — LOW (ref 8.5–10.1)
CHLORIDE SERPL-SCNC: 105 MMOL/L — SIGNIFICANT CHANGE UP (ref 96–108)
CO2 SERPL-SCNC: 23 MMOL/L — SIGNIFICANT CHANGE UP (ref 22–31)
CREAT SERPL-MCNC: 1.3 MG/DL — SIGNIFICANT CHANGE UP (ref 0.5–1.3)
CRP SERPL-MCNC: 4.51 MG/DL — HIGH (ref 0–0.4)
CULTURE RESULTS: SIGNIFICANT CHANGE UP
D DIMER BLD IA.RAPID-MCNC: 1774 NG/ML DDU — HIGH
EOSINOPHIL # BLD AUTO: 0 K/UL — SIGNIFICANT CHANGE UP (ref 0–0.5)
EOSINOPHIL NFR BLD AUTO: 0 % — SIGNIFICANT CHANGE UP (ref 0–6)
FERRITIN SERPL-MCNC: 904 NG/ML — HIGH (ref 30–400)
GLUCOSE SERPL-MCNC: 147 MG/DL — HIGH (ref 70–99)
GRAM STN FLD: SIGNIFICANT CHANGE UP
GRAM STN FLD: SIGNIFICANT CHANGE UP
HCT VFR BLD CALC: 34.6 % — LOW (ref 39–50)
HGB BLD-MCNC: 12.3 G/DL — LOW (ref 13–17)
IMM GRANULOCYTES NFR BLD AUTO: 0.8 % — SIGNIFICANT CHANGE UP (ref 0–1.5)
LDH SERPL L TO P-CCNC: 426 U/L — HIGH (ref 50–242)
LYMPHOCYTES # BLD AUTO: 0.5 K/UL — LOW (ref 1–3.3)
LYMPHOCYTES # BLD AUTO: 10.3 % — LOW (ref 13–44)
MAGNESIUM SERPL-MCNC: 1.8 MG/DL — SIGNIFICANT CHANGE UP (ref 1.6–2.6)
MCHC RBC-ENTMCNC: 31 PG — SIGNIFICANT CHANGE UP (ref 27–34)
MCHC RBC-ENTMCNC: 35.5 GM/DL — SIGNIFICANT CHANGE UP (ref 32–36)
MCV RBC AUTO: 87.2 FL — SIGNIFICANT CHANGE UP (ref 80–100)
METHOD TYPE: SIGNIFICANT CHANGE UP
MONOCYTES # BLD AUTO: 0.19 K/UL — SIGNIFICANT CHANGE UP (ref 0–0.9)
MONOCYTES NFR BLD AUTO: 3.9 % — SIGNIFICANT CHANGE UP (ref 2–14)
NEUTROPHILS # BLD AUTO: 4.13 K/UL — SIGNIFICANT CHANGE UP (ref 1.8–7.4)
NEUTROPHILS NFR BLD AUTO: 84.8 % — HIGH (ref 43–77)
NRBC # BLD: 0 /100 WBCS — SIGNIFICANT CHANGE UP (ref 0–0)
ORGANISM # SPEC MICROSCOPIC CNT: SIGNIFICANT CHANGE UP
ORGANISM # SPEC MICROSCOPIC CNT: SIGNIFICANT CHANGE UP
PLATELET # BLD AUTO: 94 K/UL — LOW (ref 150–400)
POTASSIUM SERPL-MCNC: 3.7 MMOL/L — SIGNIFICANT CHANGE UP (ref 3.5–5.3)
POTASSIUM SERPL-SCNC: 3.7 MMOL/L — SIGNIFICANT CHANGE UP (ref 3.5–5.3)
PROT SERPL-MCNC: 5.9 G/DL — LOW (ref 6–8.3)
RBC # BLD: 3.97 M/UL — LOW (ref 4.2–5.8)
RBC # FLD: 12.6 % — SIGNIFICANT CHANGE UP (ref 10.3–14.5)
SODIUM SERPL-SCNC: 137 MMOL/L — SIGNIFICANT CHANGE UP (ref 135–145)
SPECIMEN SOURCE: SIGNIFICANT CHANGE UP
WBC # BLD: 4.87 K/UL — SIGNIFICANT CHANGE UP (ref 3.8–10.5)
WBC # FLD AUTO: 4.87 K/UL — SIGNIFICANT CHANGE UP (ref 3.8–10.5)

## 2020-12-16 RX ORDER — VANCOMYCIN HCL 1 G
1000 VIAL (EA) INTRAVENOUS ONCE
Refills: 0 | Status: COMPLETED | OUTPATIENT
Start: 2020-12-16 | End: 2020-12-16

## 2020-12-16 RX ADMIN — Medication 100 MILLIGRAM(S): at 11:54

## 2020-12-16 RX ADMIN — MEMANTINE HYDROCHLORIDE 5 MILLIGRAM(S): 10 TABLET ORAL at 11:54

## 2020-12-16 RX ADMIN — ATORVASTATIN CALCIUM 10 MILLIGRAM(S): 80 TABLET, FILM COATED ORAL at 21:20

## 2020-12-16 RX ADMIN — Medication 1: at 12:40

## 2020-12-16 RX ADMIN — Medication 650 MILLIGRAM(S): at 22:19

## 2020-12-16 RX ADMIN — ENOXAPARIN SODIUM 40 MILLIGRAM(S): 100 INJECTION SUBCUTANEOUS at 11:54

## 2020-12-16 RX ADMIN — Medication 650 MILLIGRAM(S): at 01:30

## 2020-12-16 RX ADMIN — Medication 250 MILLIGRAM(S): at 08:37

## 2020-12-16 NOTE — PROGRESS NOTE ADULT - ASSESSMENT
Discussion/Summary   Please note your blood work, LDL high, rest Dr. TO [place]        Verified Results  COMP METABOLIC PANEL WITH CBCA, LIPID PANEL AND TSH (CMP,CBCA,LIPFA,TSH) 21Nov2018 07:25AM DELMER LILIANA     Test Name Result Flag Reference   WHITE BLOOD COUNT 3.8 K/mcL L 4.2-11.0   RED CELL COUNT 4.96 mil/mcL  4.50-5.90   HEMOGLOBIN 15.1 g/dl  13.0-17.0   HEMATOCRIT 46.3 %  39.0-51.0   MEAN CORPUSCULAR VOLUME 93.3 fL  78.0-100.0   MEAN CORPUSCULAR HEMOGLOBIN 30.4 pg  26.0-34.0   MEAN CORPUSCULAR HGB CONC 32.6 g/dl  32.0-36.5   RDW-CV 13.2 %  11.0-15.0   PLATELET COUNT 317 K/mcL  140-450   JOSE% 40 %     LYM% 41 %     MON% 13 %     EOS% 5 %     BASO% 1 %     JOSE ABS 1.5 K/mcL L 1.8-7.7   LYM ABS 1.6 K/mcL  1.0-4.8   MON ABS 0.5 K/mcL  0.3-0.9   EOS ABS 0.2 K/mcL  0.1-0.5   BASO ABS 0.0 K/mcL  0.0-0.3   SODIUM 139 mmol/L  135-145   POTASSIUM 4.6 mmol/L  3.4-5.1   CHLORIDE 101 mmol/L     CARBON DIOXIDE 29 mmol/L  21-32   ANION GAP 14 mmol/L  10-20   GLUCOSE 90 mg/dl  65-99   BUN 16 mg/dl  6-20   CREATININE 0.95 mg/dl  0.67-1.17   GFR EST.AFRICAN AMER >90     eGFR results = or >90 mL/min/1.73m2 = Normal kidney function.   GFR EST.NONAFRI AMER >90     eGFR results = or >90 mL/min/1.73m2 = Normal kidney function.   BUN/CREATININE RATIO 17  7-25   BILIRUBIN TOTAL 0.8 mg/dl  0.2-1.0   GOT/AST 26 Units/L  <38   ALKALINE PHOSPHATASE 61 Units/L     ALBUMIN 4.1 g/dl  3.6-5.1   TOTAL PROTEIN 7.3 g/dl  6.4-8.2   GLOBULIN (CALCULATED) 3.2 g/dl  2.0-4.0   A/G RATIO 1.3  1.0-2.4   CALCIUM 8.9 mg/dl  8.4-10.2   GPT/ALT 29 Units/L  <79   FASTING STATUS UNKNOWN hrs     CHOLESTEROL 274 mg/dl H <200   Desirable            <200  Borderline High      200 to 239  High                 >=240   HDL CHOLESTEROL 88 mg/dl  >39   Low            <40  Borderline Low 40 to 49  Near Optimal   50 to 59  Optimal        >=60   TRIGLYCERIDES 138 mg/dl  <150   Normal                   <150  Borderline High          150 to 199  High                      200 to 499  Very High                >=500   LDL CHOLESTEROL (CALCULATED) 158 mg/dl H <130   OPTIMAL               <100  NEAR OPTIMAL          100-129  BORDERLINE HIGH       130-159  HIGH                  160-189  VERY HIGH             >=190   NON-HDL CHOLESTEROL 186 mg/dl     Therapeutic Target:  CHD and risk equivalents <130  Multiple risk factors    <160  0 to 1 risk factors      <190   CHOLESTEROL/HDL RATIO 3.1  <4.5   TSH 0.487 mcUnits/mL  0.350-5.000   DIFF TYPE      AUTOMATED DIFFERENTIAL   FASTING STATUS UNKNOWN hrs     NRBC 0 /100 WBC  0   PERCENT IMMATURE GRANULOCYTES 0 %     ABSOLUTE IMMATURE GRANULOCYTES 0.0 K/mcL  0-0.2     RBC SED RATE 21Nov2018 07:25AM LILIANA DOWELL     Test Name Result Flag Reference   RBC SED RATE 6 mm/hr  0-20        87 yo M with HTN DM HLD dementia (walks with cane) who p/w gen weakness x past several days. fever, recently diagnosed with COVID sent to hospital as family can no longer care for this patient at home - 12/14 first COVID+ PCR    RECOMMENDATIONS  12/14 NLR 3.47/0.46=7.5 97% on RA, would NOT start steroid as pt sats fine, rec LMWH prophylactic dose, with no hypoxemia and unclear duration rec NO remdesivir  12/15 agree with stopping steroids, continue LMWH   12/16 blood cultures with what appear to be contaminant no further Rx, on RA, continued fevers- dispo planning    When pt is doing well and  past the critical second week when decompensation can occur and has started to improve, fine to consider discharge planning with for early but safe discharge. Discharge with oxygen (4L or less and able to keep sats>90) and even persistent fever at time of discharge is reasonable. With high risk for thromboembolic disease  consider dc on  rivaroxaban (Xarelto) or Eliquis (apixaban).  For outpt mild disease considered noninfectious at day 10 after onset of illness but for hospitalized patients day 20. If pt going to facility or to dc isolation in house then will require 2 negative PCR tests  by a minimum of 24 hours and fever free without antipyretics for >24hrs and more than 10 days from first positive test.

## 2020-12-16 NOTE — PROGRESS NOTE ADULT - SUBJECTIVE AND OBJECTIVE BOX
Patient is a 88y old  Male who presents with a chief complaint of weakness, covid (16 Dec 2020 10:28)      INTERVAL /OVERNIGHT EVENTS: feels ok    MEDICATIONS  (STANDING):  allopurinol 100 milliGRAM(s) Oral daily  ATENolol  Tablet 25 milliGRAM(s) Oral daily  atorvastatin 10 milliGRAM(s) Oral at bedtime  dextrose 40% Gel 15 Gram(s) Oral once  dextrose 5%. 1000 milliLiter(s) (50 mL/Hr) IV Continuous <Continuous>  dextrose 5%. 1000 milliLiter(s) (100 mL/Hr) IV Continuous <Continuous>  dextrose 50% Injectable 25 Gram(s) IV Push once  dextrose 50% Injectable 12.5 Gram(s) IV Push once  dextrose 50% Injectable 25 Gram(s) IV Push once  enoxaparin Injectable 40 milliGRAM(s) SubCutaneous daily  glucagon  Injectable 1 milliGRAM(s) IntraMuscular once  insulin lispro (ADMELOG) corrective regimen sliding scale   SubCutaneous three times a day before meals  insulin lispro (ADMELOG) corrective regimen sliding scale   SubCutaneous at bedtime  memantine 5 milliGRAM(s) Oral daily    MEDICATIONS  (PRN):  acetaminophen   Tablet .. 650 milliGRAM(s) Oral every 6 hours PRN Temp greater or equal to 38C (100.4F), Mild Pain (1 - 3)  ondansetron Injectable 4 milliGRAM(s) IV Push every 6 hours PRN Nausea and/or Vomiting      Allergies    penicillins (Unknown)  sulfa drugs (Unknown)    Intolerances        REVIEW OF SYSTEMS:  CONSTITUTIONAL: No fever, weight loss, or fatigue  EYES: No eye pain, visual disturbances, or discharge  ENMT:  No difficulty hearing, tinnitus, vertigo; No sinus or throat pain  NECK: No pain or stiffness  RESPIRATORY: No cough, wheezing, chills or hemoptysis; No shortness of breath  CARDIOVASCULAR: No chest pain, palpitations, dizziness, or leg swelling  GASTROINTESTINAL: No abdominal or epigastric pain. No nausea, vomiting, or hematemesis; No diarrhea or constipation. No melena or hematochezia.  GENITOURINARY: No dysuria, frequency, hematuria, or incontinence  NEUROLOGICAL: No headaches, memory loss, loss of strength, numbness, or tremors  SKIN: No itching, burning, rashes, or lesions   LYMPH NODES: No enlarged glands  ENDOCRINE: No heat or cold intolerance; No hair loss; No polydipsia or polyuria  MUSCULOSKELETAL: No joint pain or swelling; No muscle, back, or extremity pain  PSYCHIATRIC: No depression, anxiety, mood swings, or difficulty sleeping  HEME/LYMPH: No easy bruising, or bleeding gums  ALLERGY AND IMMUNOLOGIC: No hives or eczema    Vital Signs Last 24 Hrs  T(C): 37.4 (16 Dec 2020 15:31), Max: 38.5 (16 Dec 2020 00:01)  T(F): 99.4 (16 Dec 2020 15:31), Max: 101.3 (16 Dec 2020 00:01)  HR: 73 (16 Dec 2020 15:31) (73 - 94)  BP: 129/72 (16 Dec 2020 15:31) (104/68 - 168/93)  BP(mean): --  RR: 18 (16 Dec 2020 15:31) (18 - 19)  SpO2: 91% (16 Dec 2020 15:31) (90% - 97%)    PHYSICAL EXAM:  GENERAL: NAD, well-groomed, well-developed  HEAD:  Atraumatic, Normocephalic  EYES: EOMI, PERRLA, conjunctiva and sclera clear  ENMT: No tonsillar erythema, exudates, or enlargement; Moist mucous membranes, Good dentition, No lesions  NECK: Supple, No JVD, Normal thyroid  NERVOUS SYSTEM:  Alert & Oriented X3, Good concentration; Motor Strength 5/5 B/L upper and lower extremities; DTRs 2+ intact and symmetric  CHEST/LUNG: Clear to auscultation bilaterally; No rales, rhonchi, wheezing, or rubs  HEART: Regular rate and rhythm; No murmurs, rubs, or gallops  ABDOMEN: Soft, Nontender, Nondistended; Bowel sounds present  EXTREMITIES:  2+ Peripheral Pulses, No clubbing, cyanosis, or edema  LYMPH: No lymphadenopathy noted  SKIN: No rashes or lesions    LABS:                        12.3   4.87  )-----------( 94       ( 16 Dec 2020 10:13 )             34.6     16 Dec 2020 10:13    137    |  105    |  27     ----------------------------<  147    3.7     |  23     |  1.30     Ca    7.8        16 Dec 2020 10:13  Mg     1.8       16 Dec 2020 10:13    TPro  5.9    /  Alb  2.4    /  TBili  1.2    /  DBili  x      /  AST  33     /  ALT  24     /  AlkPhos  108    16 Dec 2020 10:13        CAPILLARY BLOOD GLUCOSE      POCT Blood Glucose.: 135 mg/dL (16 Dec 2020 16:50)  POCT Blood Glucose.: 165 mg/dL (16 Dec 2020 12:15)  POCT Blood Glucose.: 118 mg/dL (16 Dec 2020 08:35)  POCT Blood Glucose.: 207 mg/dL (15 Dec 2020 21:39)      RADIOLOGY & ADDITIONAL TESTS:    Notes Reviewed:  [x ] YES  [ ] NO    Care Discussed with Consultants/Other Providers [x ] YES  [ ] NO

## 2020-12-16 NOTE — PROGRESS NOTE ADULT - SUBJECTIVE AND OBJECTIVE BOX
[facility] DIVISION of INFECTIOUS DISEASE  Denver Lew MD PhD, Maxine Rodriguez MD, Anahy Syed MD, Jelena Regalado MD  and providing coverage with Katherine Martinez MD and Mala Bray MD  Providing Infectious Disease Consultations at [facility], [facility], [facility]'s      Office# [phone] to schedule follow up appointments  Answering Service for urgent calls or New Consults [phone]  Cell# to text for urgent issues Denver Lew [phone]     Infectious diseases progress note:    SPENCER LEVY is a 88y y. o. Male patient    COVID Patient    Allergies    penicillins (Unknown)  sulfa drugs (Unknown)    Intolerances        ANTIBIOTICS/RELEVANT:  antimicrobials    immunologic:    OTHER:  acetaminophen   Tablet .. 650 milliGRAM(s) Oral every 6 hours PRN  allopurinol 100 milliGRAM(s) Oral daily  ATENolol  Tablet 25 milliGRAM(s) Oral daily  atorvastatin 10 milliGRAM(s) Oral at bedtime  dextrose 40% Gel 15 Gram(s) Oral once  dextrose 5%. 1000 milliLiter(s) IV Continuous <Continuous>  dextrose 5%. 1000 milliLiter(s) IV Continuous <Continuous>  dextrose 50% Injectable 25 Gram(s) IV Push once  dextrose 50% Injectable 12.5 Gram(s) IV Push once  dextrose 50% Injectable 25 Gram(s) IV Push once  enoxaparin Injectable 40 milliGRAM(s) SubCutaneous daily  glucagon  Injectable 1 milliGRAM(s) IntraMuscular once  insulin lispro (ADMELOG) corrective regimen sliding scale   SubCutaneous three times a day before meals  insulin lispro (ADMELOG) corrective regimen sliding scale   SubCutaneous at bedtime  memantine 5 milliGRAM(s) Oral daily  ondansetron Injectable 4 milliGRAM(s) IV Push every 6 hours PRN  sodium chloride 0.9%. 1000 milliLiter(s) IV Continuous <Continuous>      Objective:  Vital Signs Last 24 Hrs  T(C): 36.7 (16 Dec 2020 08:35), Max: 38.5 (16 Dec 2020 00:01)  T(F): 98 (16 Dec 2020 08:35), Max: 101.3 (16 Dec 2020 00:01)  HR: 85 (16 Dec 2020 08:35) (69 - 94)  BP: 148/76 (16 Dec 2020 08:35) (104/68 - 168/93)  BP(mean): --  RR: 18 (16 Dec 2020 08:35) (18 - 19)  SpO2: 94% (16 Dec 2020 08:35) (90% - 97%)    T(C): 36.7 (20 @ 08:35), Max: 39.4 (12-15-20 @ 04:20)  T(C): 36.7 (20 @ 08:35), Max: 39.4 (12-15-20 @ 04:20)  T(C): 36.7 (20 @ 08:35), Max: 39.4 (12-15-20 @ 04:20)    PHYSICAL EXAM:  HEENT: NC atraumatic  Neck: supple  Respiratory: no accessory muscle use, breathing comfortably  Cardiovascular: distant  Gastrointestinal: normal appearing, nondistended  Extremities: no clubbing, no cyanosis,      LABS:                          12.1   3.31  )-----------( 82       ( 15 Dec 2020 06:00 )             34.5       3.31 12-15 @ 06:00  4.14  @ 12:44      12-15    140  |  106  |  20  ----------------------------<  92  3.5   |  23  |  1.30    Ca    7.9<L>      15 Dec 2020 06:00  Mg     1.7     12-15    TPro  5.7<L>  /  Alb  2.4<L>  /  TBili  1.3<H>  /  DBili  x   /  AST  27  /  ALT  23  /  AlkPhos  109  12-15      Creatinine, Serum: 1.30 mg/dL (12-15-20 @ 06:00)  Creatinine, Serum: 1.40 mg/dL (20 @ 12:44)      PT/INR - ( 14 Dec 2020 12:44 )   PT: 12.4 sec;   INR: 1.06 ratio         PTT - ( 14 Dec 2020 12:44 )  PTT:30.0 sec  Urinalysis Basic - ( 14 Dec 2020 12:44 )    Color: Yellow / Appearance: Clear / S.020 / pH: x  Gluc: x / Ketone: Small  / Bili: Negative / Urobili: 4   Blood: x / Protein: 30 mg/dL / Nitrite: Negative   Leuk Esterase: Negative / RBC: 0-2 /HPF / WBC 0-2   Sq Epi: x / Non Sq Epi: Occasional / Bacteria: Occasional            COVID RISK SCORE  Auto Neutrophil #: 2.56 K/uL (12-15-20 @ 06:00)  Auto Lymphocyte #: 0.57 K/uL (12-15-20 @ 06:00)  Auto Neutrophil #: 3.47 K/uL (20 @ 12:44)  Auto Lymphocyte #: 0.46 K/uL (20 @ 12:44)    Lactate, Blood: 1.1 mmol/L (12-15-20 @ 06:00)  Lactate, Blood: 2.2 mmol/L (20 @ 12:44)    Auto Eosinophil #: 0.00 K/uL (12-15-20 @ 06:00)  Auto Eosinophil #: 0.00 K/uL (20 @ 12:44)    Lactate Dehydrogenase, Serum: 265 U/L (12-15-20 @ 09:19)  Lactate Dehydrogenase, Serum: 285 U/L (20 @ 17:41)    Sedimentation Rate, Erythrocyte: 25 mm/hr (12-15-20 @ 06:00)    Procalcitonin, Serum: 0.13 ng/mL (12-15-20 @ 06:00)  Procalcitonin, Serum: 0.13 ng/mL (20 @ 12:44)    Troponin I, Serum: .015 ng/mL (20 @ 12:44)    Creatine Kinase, Serum: 253 U/L (12-15-20 @ 06:00)  Creatine Kinase, Serum: 273 U/L (20 @ 12:44)        Ferritin, Serum: 664 ng/mL (12-15-20 @ 09:17)  Ferritin, Serum: 640 ng/mL (20 @ 17:41)        Activated Partial Thromboplastin Time: 30.0 sec (20 @ 12:44)  INR: 1.06 ratio (20 @ 12:44)    D-Dimer Assay, Quantitative: 931 ng/mL DDU (12-15-20 @ 06:00)  D-Dimer Assay, Quantitative: 3136 ng/mL DDU (20 @ 12:44)        MICROBIOLOGY:    Culture - Blood (20 @ 18:59)    -  Coagulase negative Staphylococcus: Detec    -  Streptococcus sp. (Not Grp A, B or S pneumoniae): Detec      RADIOLOGY & ADDITIONAL STUDIES:

## 2020-12-16 NOTE — PROGRESS NOTE ADULT - SUBJECTIVE AND OBJECTIVE BOX
Date/Time Patient Seen:  		  Referring MD:   Data Reviewed	       Patient is a 88y old  Male who presents with a chief complaint of weakness, covid (15 Dec 2020 12:17)      Subjective/HPI     PAST MEDICAL & SURGICAL HISTORY:  Gout    Hyperlipidemia    Hypertension          Medication list         MEDICATIONS  (STANDING):  allopurinol 100 milliGRAM(s) Oral daily  ATENolol  Tablet 25 milliGRAM(s) Oral daily  atorvastatin 10 milliGRAM(s) Oral at bedtime  dextrose 40% Gel 15 Gram(s) Oral once  dextrose 5%. 1000 milliLiter(s) (50 mL/Hr) IV Continuous <Continuous>  dextrose 5%. 1000 milliLiter(s) (100 mL/Hr) IV Continuous <Continuous>  dextrose 50% Injectable 25 Gram(s) IV Push once  dextrose 50% Injectable 12.5 Gram(s) IV Push once  dextrose 50% Injectable 25 Gram(s) IV Push once  enoxaparin Injectable 40 milliGRAM(s) SubCutaneous daily  glucagon  Injectable 1 milliGRAM(s) IntraMuscular once  insulin lispro (ADMELOG) corrective regimen sliding scale   SubCutaneous three times a day before meals  insulin lispro (ADMELOG) corrective regimen sliding scale   SubCutaneous at bedtime  memantine 5 milliGRAM(s) Oral daily  sodium chloride 0.9%. 1000 milliLiter(s) (50 mL/Hr) IV Continuous <Continuous>    MEDICATIONS  (PRN):  acetaminophen   Tablet .. 650 milliGRAM(s) Oral every 6 hours PRN Temp greater or equal to 38C (100.4F), Mild Pain (1 - 3)  ondansetron Injectable 4 milliGRAM(s) IV Push every 6 hours PRN Nausea and/or Vomiting         Vitals log        ICU Vital Signs Last 24 Hrs  T(C): 36.7 (16 Dec 2020 08:35), Max: 38.5 (16 Dec 2020 00:01)  T(F): 98 (16 Dec 2020 08:35), Max: 101.3 (16 Dec 2020 00:01)  HR: 85 (16 Dec 2020 08:35) (69 - 94)  BP: 148/76 (16 Dec 2020 08:35) (104/68 - 168/93)  BP(mean): --  ABP: --  ABP(mean): --  RR: 18 (16 Dec 2020 08:35) (18 - 19)  SpO2: 94% (16 Dec 2020 08:35) (90% - 97%)           Input and Output:  I&O's Detail    15 Dec 2020 07:01  -  16 Dec 2020 07:00  --------------------------------------------------------  IN:    Oral Fluid: 200 mL    sodium chloride 0.9%: 600 mL  Total IN: 800 mL    OUT:    Voided (mL): 700 mL  Total OUT: 700 mL    Total NET: 100 mL          Lab Data                        12.1   3.31  )-----------( 82       ( 15 Dec 2020 06:00 )             34.5     12-15    140  |  106  |  20  ----------------------------<  92  3.5   |  23  |  1.30    Ca    7.9<L>      15 Dec 2020 06:00  Mg     1.7     12-15    TPro  5.7<L>  /  Alb  2.4<L>  /  TBili  1.3<H>  /  DBili  x   /  AST  27  /  ALT  23  /  AlkPhos  109  12-15      CARDIAC MARKERS ( 15 Dec 2020 06:00 )  x     / x     / 253 U/L / x     / x      CARDIAC MARKERS ( 14 Dec 2020 12:44 )  .015 ng/mL / x     / 273 U/L / x     / x            Review of Systems	      Objective     Physical Examination    heart s1s2  lung dec BS  abd soft  head nc  on RA    Pertinent Lab findings & Imaging      Tariq:  NO   Adequate UO     I&O's Detail    15 Dec 2020 07:01  -  16 Dec 2020 07:00  --------------------------------------------------------  IN:    Oral Fluid: 200 mL    sodium chloride 0.9%: 600 mL  Total IN: 800 mL    OUT:    Voided (mL): 700 mL  Total OUT: 700 mL    Total NET: 100 mL               Discussed with:     Cultures:	        Radiology

## 2020-12-16 NOTE — PROVIDER CONTACT NOTE (CRITICAL VALUE NOTIFICATION) - ASSESSMENT
Pt resting in bed, Afebrile @ present, no S/S of acute resp distress noted, Pt denies pain and discomfort

## 2020-12-17 DIAGNOSIS — I95.9 HYPOTENSION, UNSPECIFIED: ICD-10-CM

## 2020-12-17 LAB
BASE EXCESS BLDV CALC-SCNC: -1.8 MMOL/L — SIGNIFICANT CHANGE UP (ref -2–2)
BLOOD GAS COMMENTS, VENOUS: SIGNIFICANT CHANGE UP
CULTURE RESULTS: SIGNIFICANT CHANGE UP
HCO3 BLDV-SCNC: 22 MMOL/L — SIGNIFICANT CHANGE UP (ref 21–29)
HOROWITZ INDEX BLDV+IHG-RTO: 21 — SIGNIFICANT CHANGE UP
NT-PROBNP SERPL-SCNC: 8569 PG/ML — HIGH (ref 0–450)
PCO2 BLDV: 34 MMHG — LOW (ref 35–50)
PH BLDV: 7.43 — SIGNIFICANT CHANGE UP (ref 7.35–7.45)
PO2 BLDV: <44 MMHG — SIGNIFICANT CHANGE UP (ref 25–45)
SAO2 % BLDV: 55 % — LOW (ref 67–88)
SPECIMEN SOURCE: SIGNIFICANT CHANGE UP

## 2020-12-17 RX ORDER — DEXAMETHASONE 0.5 MG/5ML
6 ELIXIR ORAL DAILY
Refills: 0 | Status: DISCONTINUED | OUTPATIENT
Start: 2020-12-17 | End: 2020-12-20

## 2020-12-17 RX ORDER — SODIUM CHLORIDE 9 MG/ML
500 INJECTION INTRAMUSCULAR; INTRAVENOUS; SUBCUTANEOUS ONCE
Refills: 0 | Status: COMPLETED | OUTPATIENT
Start: 2020-12-17 | End: 2020-12-17

## 2020-12-17 RX ADMIN — ATORVASTATIN CALCIUM 10 MILLIGRAM(S): 80 TABLET, FILM COATED ORAL at 22:18

## 2020-12-17 RX ADMIN — ENOXAPARIN SODIUM 40 MILLIGRAM(S): 100 INJECTION SUBCUTANEOUS at 12:45

## 2020-12-17 RX ADMIN — Medication 100 MILLIGRAM(S): at 12:46

## 2020-12-17 RX ADMIN — Medication 6 MILLIGRAM(S): at 12:44

## 2020-12-17 RX ADMIN — Medication 1: at 17:34

## 2020-12-17 RX ADMIN — MEMANTINE HYDROCHLORIDE 5 MILLIGRAM(S): 10 TABLET ORAL at 12:44

## 2020-12-17 RX ADMIN — Medication 1: at 12:45

## 2020-12-17 RX ADMIN — SODIUM CHLORIDE 500 MILLILITER(S): 9 INJECTION INTRAMUSCULAR; INTRAVENOUS; SUBCUTANEOUS at 00:47

## 2020-12-17 NOTE — CONSULT NOTE ADULT - ASSESSMENT
12.17.2020 This is an 88 M with dementia comes with COVID PNA. Patient requires help with ADLs. He lives with daughter and wife. His daughter is his HCP. Daughter reports that both her parents have advanced directives that state that they are a DNR/DNI. She would like to complete MOLST.  12.17.2020 This is an 88 M with dementia comes with COVID PNA. Patient requires help with ADLs. He lives with daughter and wife. His daughter is his HCP. Daughter reports that both her parents have advanced directives that state that they are a DNR/DNI. She would like to complete MOLST. MOLST was reviewed, witnessed and signed. DNR/DNI order entered into EMR.

## 2020-12-17 NOTE — CHART NOTE - NSCHARTNOTEFT_GEN_A_CORE
Called by RN due to patient's hypotension, manually 76/40.    Patient seen and examined at bedside. Patient states he feels okay, denies dizziness, lightheadedness, CP, SOB or other complaints. He was given liquids to drink at bedside and drank some on his own.    T(F): 99.1 (12-17-20 @ 00:31), Max: 102.9 (12-16-20 @ 22:18)  HR: 123 (12-17-20 @ 00:31) (73 - 123)  BP: 127/63 (12-16-20 @ 19:26) (127/63 - 129/72)  RR: 16 (12-17-20 @ 00:31) (16 - 18)  SpO2: 91% (12-17-20 @ 00:31) (91% - 92%)    Physical Exam:  General: no acute distress, answers questions  HEENT: NCAT  Cardio: soft S1/S2, regular rate and rhythm  Lungs: clear to auscultation bilaterally, no accessory muscle usage, on no O2 support  Abd: soft, nontender, nondistended, normoactive bowel sounds  Ext: no pedal edema    A/P: 89 yo M with HTN, DM, HLD, dementia admitted for COVID-19 PNA. Had + BCx 12/16 that appeared to be contaminant. Now hypotensive.  - Will trial 500 cc NS bolus and reassess.      ADDENDUM (1:47AM): Repeat BP is 99/61. Will continue to monitor and avoid further IVF at this time given underlying COVID-19 infection and risk of volume overload.

## 2020-12-17 NOTE — CONSULT NOTE ADULT - SUBJECTIVE AND OBJECTIVE BOX
HPI:  87 yo M with HTN DM HLD dementia (walks with cane) who p/w gen weakness x past several days. Patient has not been eating or drinking for the past couple of days. He started having fevers and chills today 101.4. Patient's wife returned home from [facility] on 11/30. The daughter, who lives in the same house, works in a school. They all started to get sick after after wife got home on 11/30.   Pt was recently diagnosed with COVID as mult members in family have the same. Pt sent to hospital as famly can no longer care for this patient at home - pt was seen in ed yesterday for fall -- no acute findings -- and was sent home. Pt may have slid off chair today - no inj. Pt denies complaints. No abd pain. No acute dyspnea. No other acute co.  (14 Dec 2020 16:50)      PAST MEDICAL & SURGICAL HISTORY:  Gout    Hyperlipidemia    Hypertension         SOCIAL HISTORY:                                     Admitted from:  HOME         FAMILY HISTORY:      MEDICATIONS  (STANDING):  allopurinol 100 milliGRAM(s) Oral daily  ATENolol  Tablet 25 milliGRAM(s) Oral daily  atorvastatin 10 milliGRAM(s) Oral at bedtime  dexAMETHasone     Tablet 6 milliGRAM(s) Oral daily  dextrose 40% Gel 15 Gram(s) Oral once  dextrose 5%. 1000 milliLiter(s) (50 mL/Hr) IV Continuous <Continuous>  dextrose 5%. 1000 milliLiter(s) (100 mL/Hr) IV Continuous <Continuous>  dextrose 50% Injectable 25 Gram(s) IV Push once  dextrose 50% Injectable 12.5 Gram(s) IV Push once  dextrose 50% Injectable 25 Gram(s) IV Push once  enoxaparin Injectable 40 milliGRAM(s) SubCutaneous daily  glucagon  Injectable 1 milliGRAM(s) IntraMuscular once  insulin lispro (ADMELOG) corrective regimen sliding scale   SubCutaneous three times a day before meals  insulin lispro (ADMELOG) corrective regimen sliding scale   SubCutaneous at bedtime  memantine 5 milliGRAM(s) Oral daily    MEDICATIONS  (PRN):  acetaminophen   Tablet .. 650 milliGRAM(s) Oral every 6 hours PRN Temp greater or equal to 38C (100.4F), Mild Pain (1 - 3)  ondansetron Injectable 4 milliGRAM(s) IV Push every 6 hours PRN Nausea and/or Vomiting      Allergies    penicillins (Unknown)  sulfa drugs (Unknown)    Intolerances          ADVANCE DIRECTIVES:    DNR                     MOLST    Living Will      Surrogate/HCP/Guardian: Kera Pryor (daughter)         Phone#: [phone]      Baseline ADLs (prior to admission):  Independent [ ] moderately [ ] fully   Dependent   [ x] moderately [ ]fully    Palliative Performance Status Version 2:       PPS Level -- 50%  Ambulation -- Mainly Sit/Lie  Activity & Evidence of Disease -- Unable to do any work; Extensive disease  Self-Care -- Considerable assistance required  Intake --  reduced  Conscious Level --  confusion      Review of Systems: Reviewed                     Unable to obtain due to poor mentation   All others negative    Dyspnea: n  Nausea/Vomiting: n  Anxiety:  n  Depression: n  Fatigue: y  Loss of appetite: y    Pain: none            Character-            Duration-            Factors-            Frequency-            Location-            Severity-    Vital Signs Last 24 Hrs  T(C): 37.6 (17 Dec 2020 12:30), Max: 39.4 (16 Dec 2020 22:18)  T(F): 99.6 (17 Dec 2020 12:30), Max: 102.9 (16 Dec 2020 22:18)  HR: 94 (17 Dec 2020 12:30) (73 - 123)  BP: 148/63 (17 Dec 2020 12:30) (76/40 - 158/82)  BP(mean): --  RR: 19 (17 Dec 2020 12:30) (16 - 20)  SpO2: 92% (17 Dec 2020 12:30) (91% - 95%)      General: alert  oriented x __1__                   HEENT: NC/AT                   Lungs: CTA B/L                      CV: S1S2 RRR     GI:  soft,  NT,  BS+                    MSK: ambulatory with walker                        LABS:                        12.3   4.87  )-----------( 94       ( 16 Dec 2020 10:13 )             34.6     12-16    137  |  105  |  27<H>  ----------------------------<  147<H>  3.7   |  23  |  1.30    Ca    7.8<L>      16 Dec 2020 10:13  Mg     1.8     12-16    TPro  5.9<L>  /  Alb  2.4<L>  /  TBili  1.2  /  DBili  x   /  AST  33  /  ALT  24  /  AlkPhos  108  12-16        I&O's Summary    16 Dec 2020 07:01  -  17 Dec 2020 07:00  --------------------------------------------------------  IN: 700 mL / OUT: 1600 mL / NET: -900 mL        RADIOLOGY & ADDITIONAL STUDIES:      PSYCHOSOCIAL-SPIRITUAL ASSESSMENT:    _x__Reviewed     ___Care  plan unchanged     _x__Care plan adjusted as below    GOALS OF CARE DISCUSSION  	_x__Palliative care info/counseling provided	    _x__Family meeting  	__x_Advanced Directives addressed	    ___See previous Palliative Medicine Note    AGENCY CHOICE DISCUSSED:   ___HOSPICE     ___OTHER:              > 50% OF THE TIME SPENT IN COUNSELING AND COORDINATING CARE 	   Start:               End:  	       Minutes:      PROLONGED SERVICE             FACE TO FACE:    PT            PT & FAMILY	   Start:               End:  	       Minutes:      Advance Care Planning Time:

## 2020-12-17 NOTE — PROGRESS NOTE ADULT - SUBJECTIVE AND OBJECTIVE BOX
[facility] DIVISION of INFECTIOUS DISEASE  Denver Lew MD PhD, Maxine Rodriguez MD, Anahy Syed MD, Jelena Regalado MD  and providing coverage with Katherine Martinez MD and Mala Bray MD  Providing Infectious Disease Consultations at [facility], [facility], [facility]'s      Office# [phone] to schedule follow up appointments  Answering Service for urgent calls or New Consults [phone]  Cell# to text for urgent issues Denver Lew [phone]     Infectious diseases progress note:    SPENCER LEVY is a 88y y. o. Male patient    COVID Patient    Allergies    penicillins (Unknown)  sulfa drugs (Unknown)    Intolerances        ANTIBIOTICS/RELEVANT:  antimicrobials    immunologic:    OTHER:  acetaminophen   Tablet .. 650 milliGRAM(s) Oral every 6 hours PRN  allopurinol 100 milliGRAM(s) Oral daily  ATENolol  Tablet 25 milliGRAM(s) Oral daily  atorvastatin 10 milliGRAM(s) Oral at bedtime  dexAMETHasone     Tablet 6 milliGRAM(s) Oral daily  dextrose 40% Gel 15 Gram(s) Oral once  dextrose 5%. 1000 milliLiter(s) IV Continuous <Continuous>  dextrose 5%. 1000 milliLiter(s) IV Continuous <Continuous>  dextrose 50% Injectable 25 Gram(s) IV Push once  dextrose 50% Injectable 12.5 Gram(s) IV Push once  dextrose 50% Injectable 25 Gram(s) IV Push once  enoxaparin Injectable 40 milliGRAM(s) SubCutaneous daily  glucagon  Injectable 1 milliGRAM(s) IntraMuscular once  insulin lispro (ADMELOG) corrective regimen sliding scale   SubCutaneous three times a day before meals  insulin lispro (ADMELOG) corrective regimen sliding scale   SubCutaneous at bedtime  memantine 5 milliGRAM(s) Oral daily  ondansetron Injectable 4 milliGRAM(s) IV Push every 6 hours PRN      Objective:  Vital Signs Last 24 Hrs  T(C): 36.8 (17 Dec 2020 07:39), Max: 39.4 (16 Dec 2020 22:18)  T(F): 98.2 (17 Dec 2020 07:39), Max: 102.9 (16 Dec 2020 22:18)  HR: 111 (17 Dec 2020 07:39) (73 - 123)  BP: 158/82 (17 Dec 2020 07:39) (76/40 - 158/82)  BP(mean): --  RR: 20 (17 Dec 2020 07:39) (16 - 20)  SpO2: 94% (17 Dec 2020 07:39) (91% - 95%)    T(C): 36.8 (12-17-20 @ 07:39), Max: 39.4 (12-16-20 @ 22:18)  T(C): 36.8 (12-17-20 @ 07:39), Max: 39.4 (12-15-20 @ 04:20)  T(C): 36.8 (12-17-20 @ 07:39), Max: 39.4 (12-15-20 @ 04:20)    PHYSICAL EXAM:  HEENT: NC atraumatic  Neck: supple  Respiratory: no accessory muscle use, breathing comfortably  Cardiovascular: distant  Gastrointestinal: normal appearing, nondistended  Extremities: no clubbing, no cyanosis,      LABS:                          12.3   4.87  )-----------( 94       ( 16 Dec 2020 10:13 )             34.6       4.87 12-16 @ 10:13  3.31 12-15 @ 06:00  4.14 12-14 @ 12:44      12-16    137  |  105  |  27<H>  ----------------------------<  147<H>  3.7   |  23  |  1.30    Ca    7.8<L>      16 Dec 2020 10:13  Mg     1.8     12-16    TPro  5.9<L>  /  Alb  2.4<L>  /  TBili  1.2  /  DBili  x   /  AST  33  /  ALT  24  /  AlkPhos  108  12-16      Creatinine, Serum: 1.30 mg/dL (12-16-20 @ 10:13)  Creatinine, Serum: 1.30 mg/dL (12-15-20 @ 06:00)  Creatinine, Serum: 1.40 mg/dL (12-14-20 @ 12:44)                COVID RISK SCORE  Auto Neutrophil #: 4.13 K/uL (12-16-20 @ 10:13)  Auto Lymphocyte #: 0.50 K/uL (12-16-20 @ 10:13)  Auto Neutrophil #: 2.56 K/uL (12-15-20 @ 06:00)  Auto Lymphocyte #: 0.57 K/uL (12-15-20 @ 06:00)  Auto Neutrophil #: 3.47 K/uL (12-14-20 @ 12:44)  Auto Lymphocyte #: 0.46 K/uL (12-14-20 @ 12:44)    Lactate, Blood: 1.1 mmol/L (12-15-20 @ 06:00)  Lactate, Blood: 2.2 mmol/L (12-14-20 @ 12:44)    Auto Eosinophil #: 0.00 K/uL (12-16-20 @ 10:13)  Auto Eosinophil #: 0.00 K/uL (12-15-20 @ 06:00)  Auto Eosinophil #: 0.00 K/uL (12-14-20 @ 12:44)    Lactate Dehydrogenase, Serum: 426 U/L (12-16-20 @ 15:40)  Lactate Dehydrogenase, Serum: 265 U/L (12-15-20 @ 09:19)  Lactate Dehydrogenase, Serum: 285 U/L (12-14-20 @ 17:41)    Sedimentation Rate, Erythrocyte: 25 mm/hr (12-15-20 @ 06:00)    Procalcitonin, Serum: 0.13 ng/mL (12-15-20 @ 06:00)  Procalcitonin, Serum: 0.13 ng/mL (12-14-20 @ 12:44)    Troponin I, Serum: .015 ng/mL (12-14-20 @ 12:44)    Creatine Kinase, Serum: 253 U/L (12-15-20 @ 06:00)  Creatine Kinase, Serum: 273 U/L (12-14-20 @ 12:44)        Ferritin, Serum: 904 ng/mL (12-16-20 @ 15:28)  Ferritin, Serum: 664 ng/mL (12-15-20 @ 09:17)  Ferritin, Serum: 640 ng/mL (12-14-20 @ 17:41)        Activated Partial Thromboplastin Time: 30.0 sec (12-14-20 @ 12:44)  INR: 1.06 ratio (12-14-20 @ 12:44)    D-Dimer Assay, Quantitative: 1774 ng/mL DDU (12-16-20 @ 10:13)  D-Dimer Assay, Quantitative: 931 ng/mL DDU (12-15-20 @ 06:00)  D-Dimer Assay, Quantitative: 3136 ng/mL DDU (12-14-20 @ 12:44)        MICROBIOLOGY:              RADIOLOGY & ADDITIONAL STUDIES:

## 2020-12-17 NOTE — PROGRESS NOTE ADULT - ASSESSMENT
89 yo M with HTN DM HLD dementia (walks with cane) who p/w gen weakness x past several days. fever, recently diagnosed with COVID sent to hospital as family can no longer care for this patient at home - 12/14 first COVID+ PCR    RECOMMENDATIONS  12/14 NLR 3.47/0.46=7.5 97% on RA, would NOT start steroid as pt sats fine, rec LMWH prophylactic dose, with no hypoxemia and unclear duration rec NO remdesivir  12/15 agree with stopping steroids, continue LMWH   12/16 NLR 4/0.5=8 blood cultures with what appear to be contaminant no further Rx, on RA, continued fevers  12/17 now on NC-3.5L some concerns for secondary process will send off further testing,     When pt is doing well and  past the critical second week when decompensation can occur and has started to improve, fine to consider discharge planning with for early but safe discharge. Discharge with oxygen (4L or less and able to keep sats>90) and even persistent fever at time of discharge is reasonable. With high risk for thromboembolic disease  consider dc on  rivaroxaban (Xarelto) or Eliquis (apixaban).  For outpt mild disease considered noninfectious at day 10 after onset of illness but for hospitalized patients day 20. If pt going to facility or to dc isolation in house then will require 2 negative PCR tests  by a minimum of 24 hours and fever free without antipyretics for >24hrs and more than 10 days from first positive test.

## 2020-12-18 LAB
-  AMPICILLIN/SULBACTAM: SIGNIFICANT CHANGE UP
-  CEFAZOLIN: SIGNIFICANT CHANGE UP
-  CLINDAMYCIN: SIGNIFICANT CHANGE UP
-  ERYTHROMYCIN: SIGNIFICANT CHANGE UP
-  GENTAMICIN: SIGNIFICANT CHANGE UP
-  OXACILLIN: SIGNIFICANT CHANGE UP
-  RIFAMPIN: SIGNIFICANT CHANGE UP
-  TETRACYCLINE: SIGNIFICANT CHANGE UP
-  TRIMETHOPRIM/SULFAMETHOXAZOLE: SIGNIFICANT CHANGE UP
-  VANCOMYCIN: SIGNIFICANT CHANGE UP
ALBUMIN SERPL ELPH-MCNC: 2 G/DL — LOW (ref 3.3–5)
ALP SERPL-CCNC: 127 U/L — HIGH (ref 40–120)
ALT FLD-CCNC: 31 U/L — SIGNIFICANT CHANGE UP (ref 12–78)
ANION GAP SERPL CALC-SCNC: 13 MMOL/L — SIGNIFICANT CHANGE UP (ref 5–17)
AST SERPL-CCNC: 41 U/L — HIGH (ref 15–37)
BASOPHILS # BLD AUTO: 0.01 K/UL — SIGNIFICANT CHANGE UP (ref 0–0.2)
BASOPHILS NFR BLD AUTO: 0.1 % — SIGNIFICANT CHANGE UP (ref 0–2)
BILIRUB DIRECT SERPL-MCNC: 1.3 MG/DL — HIGH (ref 0.05–0.2)
BILIRUB INDIRECT FLD-MCNC: 0.5 MG/DL — SIGNIFICANT CHANGE UP (ref 0.2–1)
BILIRUB SERPL-MCNC: 1.8 MG/DL — HIGH (ref 0.2–1.2)
BUN SERPL-MCNC: 31 MG/DL — HIGH (ref 7–23)
CALCIUM SERPL-MCNC: 8 MG/DL — LOW (ref 8.5–10.1)
CHLORIDE SERPL-SCNC: 102 MMOL/L — SIGNIFICANT CHANGE UP (ref 96–108)
CO2 SERPL-SCNC: 19 MMOL/L — LOW (ref 22–31)
CREAT SERPL-MCNC: 1.4 MG/DL — HIGH (ref 0.5–1.3)
CRP SERPL-MCNC: 7.68 MG/DL — HIGH (ref 0–0.4)
CULTURE RESULTS: SIGNIFICANT CHANGE UP
D DIMER BLD IA.RAPID-MCNC: 1198 NG/ML DDU — HIGH
EOSINOPHIL # BLD AUTO: 0 K/UL — SIGNIFICANT CHANGE UP (ref 0–0.5)
EOSINOPHIL NFR BLD AUTO: 0 % — SIGNIFICANT CHANGE UP (ref 0–6)
FERRITIN SERPL-MCNC: 1748 NG/ML — HIGH (ref 30–400)
GLUCOSE SERPL-MCNC: 162 MG/DL — HIGH (ref 70–99)
HCT VFR BLD CALC: 32.4 % — LOW (ref 39–50)
HCT VFR BLD CALC: 34.2 % — LOW (ref 39–50)
HGB BLD-MCNC: 11.7 G/DL — LOW (ref 13–17)
HGB BLD-MCNC: 12.3 G/DL — LOW (ref 13–17)
IMM GRANULOCYTES NFR BLD AUTO: 0.6 % — SIGNIFICANT CHANGE UP (ref 0–1.5)
LYMPHOCYTES # BLD AUTO: 0.29 K/UL — LOW (ref 1–3.3)
LYMPHOCYTES # BLD AUTO: 3.5 % — LOW (ref 13–44)
MAGNESIUM SERPL-MCNC: 1.9 MG/DL — SIGNIFICANT CHANGE UP (ref 1.6–2.6)
MCHC RBC-ENTMCNC: 30.4 PG — SIGNIFICANT CHANGE UP (ref 27–34)
MCHC RBC-ENTMCNC: 30.6 PG — SIGNIFICANT CHANGE UP (ref 27–34)
MCHC RBC-ENTMCNC: 36 GM/DL — SIGNIFICANT CHANGE UP (ref 32–36)
MCHC RBC-ENTMCNC: 36.1 GM/DL — HIGH (ref 32–36)
MCV RBC AUTO: 84.7 FL — SIGNIFICANT CHANGE UP (ref 80–100)
MCV RBC AUTO: 84.8 FL — SIGNIFICANT CHANGE UP (ref 80–100)
METHOD TYPE: SIGNIFICANT CHANGE UP
MONOCYTES # BLD AUTO: 0.22 K/UL — SIGNIFICANT CHANGE UP (ref 0–0.9)
MONOCYTES NFR BLD AUTO: 2.6 % — SIGNIFICANT CHANGE UP (ref 2–14)
NEUTROPHILS # BLD AUTO: 7.76 K/UL — HIGH (ref 1.8–7.4)
NEUTROPHILS NFR BLD AUTO: 93.2 % — HIGH (ref 43–77)
NRBC # BLD: 0 /100 WBCS — SIGNIFICANT CHANGE UP (ref 0–0)
NRBC # BLD: 0 /100 WBCS — SIGNIFICANT CHANGE UP (ref 0–0)
ORGANISM # SPEC MICROSCOPIC CNT: SIGNIFICANT CHANGE UP
PLATELET # BLD AUTO: 106 K/UL — LOW (ref 150–400)
PLATELET # BLD AUTO: 119 K/UL — LOW (ref 150–400)
POTASSIUM SERPL-MCNC: 3.4 MMOL/L — LOW (ref 3.5–5.3)
POTASSIUM SERPL-SCNC: 3.4 MMOL/L — LOW (ref 3.5–5.3)
PROCALCITONIN SERPL-MCNC: 0.49 NG/ML — HIGH (ref 0–0.04)
PROT SERPL-MCNC: 5.5 G/DL — LOW (ref 6–8.3)
RBC # BLD: 3.82 M/UL — LOW (ref 4.2–5.8)
RBC # BLD: 4.04 M/UL — LOW (ref 4.2–5.8)
RBC # FLD: 12.8 % — SIGNIFICANT CHANGE UP (ref 10.3–14.5)
RBC # FLD: 12.9 % — SIGNIFICANT CHANGE UP (ref 10.3–14.5)
SARS-COV-2 RNA SPEC QL NAA+PROBE: DETECTED
SODIUM SERPL-SCNC: 134 MMOL/L — LOW (ref 135–145)
SPECIMEN SOURCE: SIGNIFICANT CHANGE UP
WBC # BLD: 8.33 K/UL — SIGNIFICANT CHANGE UP (ref 3.8–10.5)
WBC # BLD: 9.34 K/UL — SIGNIFICANT CHANGE UP (ref 3.8–10.5)
WBC # FLD AUTO: 8.33 K/UL — SIGNIFICANT CHANGE UP (ref 3.8–10.5)
WBC # FLD AUTO: 9.34 K/UL — SIGNIFICANT CHANGE UP (ref 3.8–10.5)

## 2020-12-18 PROCEDURE — 71045 X-RAY EXAM CHEST 1 VIEW: CPT | Mod: 26

## 2020-12-18 RX ORDER — REMDESIVIR 5 MG/ML
200 INJECTION INTRAVENOUS EVERY 24 HOURS
Refills: 0 | Status: COMPLETED | OUTPATIENT
Start: 2020-12-18 | End: 2020-12-18

## 2020-12-18 RX ORDER — REMDESIVIR 5 MG/ML
100 INJECTION INTRAVENOUS EVERY 24 HOURS
Refills: 0 | Status: DISCONTINUED | OUTPATIENT
Start: 2020-12-19 | End: 2020-12-20

## 2020-12-18 RX ORDER — REMDESIVIR 5 MG/ML
INJECTION INTRAVENOUS
Refills: 0 | Status: DISCONTINUED | OUTPATIENT
Start: 2020-12-18 | End: 2020-12-18

## 2020-12-18 RX ORDER — DRONABINOL 2.5 MG
2.5 CAPSULE ORAL
Refills: 0 | Status: DISCONTINUED | OUTPATIENT
Start: 2020-12-18 | End: 2020-12-20

## 2020-12-18 RX ADMIN — Medication 2: at 12:37

## 2020-12-18 RX ADMIN — ATORVASTATIN CALCIUM 10 MILLIGRAM(S): 80 TABLET, FILM COATED ORAL at 22:00

## 2020-12-18 RX ADMIN — Medication 2.5 MILLIGRAM(S): at 17:45

## 2020-12-18 RX ADMIN — Medication 6 MILLIGRAM(S): at 04:55

## 2020-12-18 RX ADMIN — MEMANTINE HYDROCHLORIDE 5 MILLIGRAM(S): 10 TABLET ORAL at 12:38

## 2020-12-18 RX ADMIN — Medication 100 MILLIGRAM(S): at 12:39

## 2020-12-18 RX ADMIN — Medication 1: at 09:08

## 2020-12-18 RX ADMIN — ATENOLOL 25 MILLIGRAM(S): 25 TABLET ORAL at 04:56

## 2020-12-18 RX ADMIN — REMDESIVIR 500 MILLIGRAM(S): 5 INJECTION INTRAVENOUS at 12:38

## 2020-12-18 RX ADMIN — Medication 1: at 17:45

## 2020-12-18 RX ADMIN — ENOXAPARIN SODIUM 40 MILLIGRAM(S): 100 INJECTION SUBCUTANEOUS at 12:39

## 2020-12-18 RX ADMIN — Medication 650 MILLIGRAM(S): at 04:55

## 2020-12-18 NOTE — PROGRESS NOTE ADULT - SUBJECTIVE AND OBJECTIVE BOX
Patient is a 88y old  Male who presents with a chief complaint of weakness, covid (18 Dec 2020 10:34)      Subjective: Patient seen and examined at bedside. No acute events overnight.     PAIN: N  DYSPNEA: N	  NAUS/VOM: 	N  SECRETIONS: 	N  AGITATION: N    OTHER REVIEW OF SYSTEMS: negative      T(C): 36.9 (12-18-20 @ 07:55), Max: 38.1 (12-18-20 @ 05:05)  HR: 79 (12-18-20 @ 07:55) (79 - 109)  BP: 96/57 (12-18-20 @ 07:55) (96/57 - 139/75)  RR: 19 (12-18-20 @ 07:55) (19 - 19)  SpO2: 90% (12-18-20 @ 07:55) (90% - 90%)  Wt(kg): --  GENERAL:  resting comfortably in NAD  HEENT:  NC/AT EOMI PERRL  NECK: supple no JVD  CVS:  +S1 S2 RRR  RESP: CTA B/L  GI:  soft NT/ND +BS  : no suprapubic tenderness  MUSC:  no lower extremity edema  SKIN:  Warm, moist, no rashes   LYMPH: normal     MEDS REVIEWED	          OPIATE NAÏVE (Y/N)    penicillins (Unknown)  sulfa drugs (Unknown)      LABS REVIEWED:                        12.3   9.34  )-----------( 119      ( 18 Dec 2020 09:59 )             34.2     12-18    134<L>  |  102  |  31<H>  ----------------------------<  162<H>  3.4<L>   |  19<L>  |  1.40<H>    Ca    8.0<L>      18 Dec 2020 09:59  Mg     1.9     12-18    TPro  5.5<L>  /  Alb  2.0<L>  /  TBili  1.8<H>  /  DBili  1.30<H>  /  AST  41<H>  /  ALT  31  /  AlkPhos  127<H>  12-18      IMAGING REVIEWED    ADVANCED DIRECTIVES:     FULL CODE     DNR     DNI     LIVING WILL     MOLST    PSYCHOSOCIAL-SPIRITUAL ASSESSMENT:    ___Reviewed     ___Care  plan unchanged     ___Care plan adjusted as below    GOALS OF CARE DISCUSSION  	___Palliative care info/counseling provided	    ___Family meeting  	___Advanced Directives addressed	    ___See previous Palliative Medicine Note    AGENCY CHOICE DISCUSSED:   ___HOSPICE   ___CALVARY  ___OTHER:              > 50% OF THE TIME SPENT IN COUNSELING AND COORDINATING CARE 	    Minutes:      PROLONGED SERVICE             FACE TO FACE:    PT            PT & FAMILY	       Minutes:      Advance Care Planning Time:

## 2020-12-18 NOTE — PROGRESS NOTE ADULT - SUBJECTIVE AND OBJECTIVE BOX
[facility] DIVISION of INFECTIOUS DISEASE  Denver Lew MD PhD, Maxine Rodriguez MD, Anahy Syed MD, Jelena Regalado MD  and providing coverage with Katherine Martinez MD and Mala Bray MD  Providing Infectious Disease Consultations at [facility], [facility], [facility]'s      Office# [phone] to schedule follow up appointments  Answering Service for urgent calls or New Consults [phone]  Cell# to text for urgent issues Denver Lew [phone]     Infectious diseases progress note:    SPENCER LEVY is a 88y y. o. Male patient    COVID Patient    Allergies    penicillins (Unknown)  sulfa drugs (Unknown)    Intolerances        ANTIBIOTICS/RELEVANT:  antimicrobials  remdesivir  IVPB   IV Intermittent   remdesivir  IVPB 200 milliGRAM(s) IV Intermittent every 24 hours    immunologic:    OTHER:  acetaminophen   Tablet .. 650 milliGRAM(s) Oral every 6 hours PRN  allopurinol 100 milliGRAM(s) Oral daily  ATENolol  Tablet 25 milliGRAM(s) Oral daily  atorvastatin 10 milliGRAM(s) Oral at bedtime  dexAMETHasone     Tablet 6 milliGRAM(s) Oral daily  dextrose 40% Gel 15 Gram(s) Oral once  dextrose 5%. 1000 milliLiter(s) IV Continuous <Continuous>  dextrose 5%. 1000 milliLiter(s) IV Continuous <Continuous>  dextrose 50% Injectable 25 Gram(s) IV Push once  dextrose 50% Injectable 12.5 Gram(s) IV Push once  dextrose 50% Injectable 25 Gram(s) IV Push once  enoxaparin Injectable 40 milliGRAM(s) SubCutaneous daily  glucagon  Injectable 1 milliGRAM(s) IntraMuscular once  insulin lispro (ADMELOG) corrective regimen sliding scale   SubCutaneous three times a day before meals  insulin lispro (ADMELOG) corrective regimen sliding scale   SubCutaneous at bedtime  memantine 5 milliGRAM(s) Oral daily  ondansetron Injectable 4 milliGRAM(s) IV Push every 6 hours PRN      Objective:  Vital Signs Last 24 Hrs  T(C): 36.9 (18 Dec 2020 07:55), Max: 38.1 (18 Dec 2020 05:05)  T(F): 98.5 (18 Dec 2020 07:55), Max: 100.6 (18 Dec 2020 05:05)  HR: 79 (18 Dec 2020 07:55) (79 - 109)  BP: 96/57 (18 Dec 2020 07:55) (96/57 - 148/63)  BP(mean): --  RR: 19 (18 Dec 2020 07:55) (19 - 19)  SpO2: 90% (18 Dec 2020 07:55) (90% - 92%)    T(C): 36.9 (12-18-20 @ 07:55), Max: 39.4 (12-16-20 @ 22:18)  T(C): 36.9 (12-18-20 @ 07:55), Max: 39.4 (12-16-20 @ 22:18)  T(C): 36.9 (12-18-20 @ 07:55), Max: 39.4 (12-15-20 @ 04:20)    PHYSICAL EXAM:  HEENT: NC atraumatic  Neck: supple  Respiratory: no accessory muscle use, breathing comfortably  Cardiovascular: distant  Gastrointestinal: normal appearing, nondistended  Extremities: no clubbing, no cyanosis,      LABS:                          12.3   9.34  )-----------( 119      ( 18 Dec 2020 09:59 )             34.2       9.34 12-18 @ 09:59  4.87 12-16 @ 10:13  3.31 12-15 @ 06:00  4.14 12-14 @ 12:44      12-18    134<L>  |  102  |  31<H>  ----------------------------<  162<H>  3.4<L>   |  19<L>  |  1.40<H>    Ca    8.0<L>      18 Dec 2020 09:59  Mg     1.9     12-18    TPro  5.5<L>  /  Alb  2.0<L>  /  TBili  1.8<H>  /  DBili  1.30<H>  /  AST  41<H>  /  ALT  31  /  AlkPhos  127<H>  12-18      Creatinine, Serum: 1.40 mg/dL (12-18-20 @ 09:59)  Creatinine, Serum: 1.30 mg/dL (12-16-20 @ 10:13)  Creatinine, Serum: 1.30 mg/dL (12-15-20 @ 06:00)  Creatinine, Serum: 1.40 mg/dL (12-14-20 @ 12:44)                COVID RISK SCORE  Auto Neutrophil #: 4.13 K/uL (12-16-20 @ 10:13)  Auto Lymphocyte #: 0.50 K/uL (12-16-20 @ 10:13)  Auto Neutrophil #: 2.56 K/uL (12-15-20 @ 06:00)  Auto Lymphocyte #: 0.57 K/uL (12-15-20 @ 06:00)  Auto Neutrophil #: 3.47 K/uL (12-14-20 @ 12:44)  Auto Lymphocyte #: 0.46 K/uL (12-14-20 @ 12:44)    Lactate, Blood: 1.1 mmol/L (12-15-20 @ 06:00)  Lactate, Blood: 2.2 mmol/L (12-14-20 @ 12:44)    Auto Eosinophil #: 0.00 K/uL (12-16-20 @ 10:13)  Auto Eosinophil #: 0.00 K/uL (12-15-20 @ 06:00)  Auto Eosinophil #: 0.00 K/uL (12-14-20 @ 12:44)    Lactate Dehydrogenase, Serum: 426 U/L (12-16-20 @ 15:40)  Lactate Dehydrogenase, Serum: 265 U/L (12-15-20 @ 09:19)  Lactate Dehydrogenase, Serum: 285 U/L (12-14-20 @ 17:41)    Sedimentation Rate, Erythrocyte: 25 mm/hr (12-15-20 @ 06:00)    Procalcitonin, Serum: 0.49 ng/mL (12-18-20 @ 07:31)  Procalcitonin, Serum: 0.13 ng/mL (12-15-20 @ 06:00)  Procalcitonin, Serum: 0.13 ng/mL (12-14-20 @ 12:44)    Troponin I, Serum: .015 ng/mL (12-14-20 @ 12:44)    Creatine Kinase, Serum: 253 U/L (12-15-20 @ 06:00)  Creatine Kinase, Serum: 273 U/L (12-14-20 @ 12:44)        Ferritin, Serum: 904 ng/mL (12-16-20 @ 15:28)  Ferritin, Serum: 664 ng/mL (12-15-20 @ 09:17)  Ferritin, Serum: 640 ng/mL (12-14-20 @ 17:41)        Activated Partial Thromboplastin Time: 30.0 sec (12-14-20 @ 12:44)  INR: 1.06 ratio (12-14-20 @ 12:44)    D-Dimer Assay, Quantitative: 1198 ng/mL DDU (12-18-20 @ 07:31)  D-Dimer Assay, Quantitative: 1774 ng/mL DDU (12-16-20 @ 10:13)  D-Dimer Assay, Quantitative: 931 ng/mL DDU (12-15-20 @ 06:00)  D-Dimer Assay, Quantitative: 3136 ng/mL DDU (12-14-20 @ 12:44)        MICROBIOLOGY:              RADIOLOGY & ADDITIONAL STUDIES:

## 2020-12-18 NOTE — PROGRESS NOTE ADULT - SUBJECTIVE AND OBJECTIVE BOX
Patient is a 88y old  Male who presents with a chief complaint of weakness, covid (18 Dec 2020 15:00)      INTERVAL /OVERNIGHT EVENTS: poor oral intake    MEDICATIONS  (STANDING):  allopurinol 100 milliGRAM(s) Oral daily  ATENolol  Tablet 25 milliGRAM(s) Oral daily  atorvastatin 10 milliGRAM(s) Oral at bedtime  dexAMETHasone     Tablet 6 milliGRAM(s) Oral daily  dextrose 40% Gel 15 Gram(s) Oral once  dextrose 5%. 1000 milliLiter(s) (50 mL/Hr) IV Continuous <Continuous>  dextrose 5%. 1000 milliLiter(s) (100 mL/Hr) IV Continuous <Continuous>  dextrose 50% Injectable 25 Gram(s) IV Push once  dextrose 50% Injectable 12.5 Gram(s) IV Push once  dextrose 50% Injectable 25 Gram(s) IV Push once  dronabinol 2.5 milliGRAM(s) Oral two times a day  enoxaparin Injectable 40 milliGRAM(s) SubCutaneous daily  glucagon  Injectable 1 milliGRAM(s) IntraMuscular once  insulin lispro (ADMELOG) corrective regimen sliding scale   SubCutaneous three times a day before meals  insulin lispro (ADMELOG) corrective regimen sliding scale   SubCutaneous at bedtime  memantine 5 milliGRAM(s) Oral daily    MEDICATIONS  (PRN):  acetaminophen   Tablet .. 650 milliGRAM(s) Oral every 6 hours PRN Temp greater or equal to 38C (100.4F), Mild Pain (1 - 3)  ondansetron Injectable 4 milliGRAM(s) IV Push every 6 hours PRN Nausea and/or Vomiting      Allergies    penicillins (Unknown)  sulfa drugs (Unknown)    Intolerances        REVIEW OF SYSTEMS:  CONSTITUTIONAL: + fatigue  EYES: No eye pain, visual disturbances, or discharge  ENMT:  No difficulty hearing, tinnitus, vertigo; No sinus or throat pain  NECK: No pain or stiffness  RESPIRATORY: No cough, wheezing, chills or hemoptysis; No shortness of breath  CARDIOVASCULAR: No chest pain, palpitations, dizziness, or leg swelling  GASTROINTESTINAL: No abdominal or epigastric pain. No nausea, vomiting, or hematemesis; No diarrhea or constipation. No melena or hematochezia.  GENITOURINARY: No dysuria, frequency, hematuria, or incontinence  NEUROLOGICAL: No headaches, memory loss, loss of strength, numbness, or tremors  SKIN: No itching, burning, rashes, or lesions   LYMPH NODES: No enlarged glands  ENDOCRINE: No heat or cold intolerance; No hair loss; No polydipsia or polyuria  MUSCULOSKELETAL: No joint pain or swelling; No muscle, back, or extremity pain  PSYCHIATRIC: No depression, anxiety, mood swings, or difficulty sleeping  HEME/LYMPH: No easy bruising, or bleeding gums  ALLERGY AND IMMUNOLOGIC: No hives or eczema    Vital Signs Last 24 Hrs  T(C): 36.4 (18 Dec 2020 15:27), Max: 38.1 (18 Dec 2020 05:05)  T(F): 97.5 (18 Dec 2020 15:27), Max: 100.6 (18 Dec 2020 05:05)  HR: 97 (18 Dec 2020 15:27) (79 - 109)  BP: 104/67 (18 Dec 2020 15:27) (96/57 - 139/75)  BP(mean): --  RR: 19 (18 Dec 2020 15:27) (19 - 19)  SpO2: 91% (18 Dec 2020 15:27) (90% - 91%)    PHYSICAL EXAM:  GENERAL: NAD, well-groomed, well-developed  HEAD:  Atraumatic, Normocephalic  EYES: EOMI, PERRLA, conjunctiva and sclera clear  ENMT: No tonsillar erythema, exudates, or enlargement; Moist mucous membranes, Good dentition, No lesions  NECK: Supple, No JVD, Normal thyroid  NERVOUS SYSTEM:  Alert & Oriented X3, Good concentration; Motor Strength 5/5 B/L upper and lower extremities; DTRs 2+ intact and symmetric  CHEST/LUNG: Clear to auscultation bilaterally; No rales, rhonchi, wheezing, or rubs  HEART: Regular rate and rhythm; No murmurs, rubs, or gallops  ABDOMEN: Soft, Nontender, Nondistended; Bowel sounds present  EXTREMITIES:  2+ Peripheral Pulses, No clubbing, cyanosis, or edema  LYMPH: No lymphadenopathy noted  SKIN: No rashes or lesions    LABS:                        12.3   9.34  )-----------( 119      ( 18 Dec 2020 09:59 )             34.2     18 Dec 2020 09:59    134    |  102    |  31     ----------------------------<  162    3.4     |  19     |  1.40     Ca    8.0        18 Dec 2020 09:59  Mg     1.9       18 Dec 2020 07:31    TPro  5.5    /  Alb  2.0    /  TBili  1.8    /  DBili  1.30   /  AST  41     /  ALT  31     /  AlkPhos  127    18 Dec 2020 09:59        CAPILLARY BLOOD GLUCOSE      POCT Blood Glucose.: 182 mg/dL (18 Dec 2020 16:48)  POCT Blood Glucose.: 220 mg/dL (18 Dec 2020 11:59)  POCT Blood Glucose.: 162 mg/dL (18 Dec 2020 08:50)  POCT Blood Glucose.: 165 mg/dL (17 Dec 2020 22:12)      RADIOLOGY & ADDITIONAL TESTS:    Notes Reviewed:  [x ] YES  [ ] NO    Care Discussed with Consultants/Other Providers [x ] YES  [ ] NO

## 2020-12-18 NOTE — PROGRESS NOTE ADULT - ASSESSMENT
12.17.2020 This is an 88 M with dementia comes with COVID PNA. Patient requires help with ADLs. He lives with daughter and wife. His daughter is his HCP. Daughter reports that both her parents have advanced directives that state that they are a DNR/DNI. She would like to complete MOLST. MOLST was reviewed, witnessed and signed. DNR/DNI order entered into EMR.    12.18.2020 Patient still febrile and requiring oxygen. Poor po intake. On remdisivir and decadron.

## 2020-12-18 NOTE — PROGRESS NOTE ADULT - ASSESSMENT
89 yo M with HTN DM HLD dementia (walks with cane) who p/w gen weakness x past several days. fever, recently diagnosed with COVID sent to hospital as family can no longer care for this patient at home - 12/14 first COVID+ PCR    RECOMMENDATIONS  12/14 NLR 3.47/0.46=7.5 97% on RA, would NOT start steroid as pt sats fine, rec LMWH prophylactic dose, with no hypoxemia and unclear duration rec NO remdesivir  blood with what appears to be a contaminant, urine with <100k enterococcus  12/15 agree with stopping steroids, continue LMWH   12/16 NLR 4/0.5=8 blood cultures with what appear to be contaminant no further Rx, on RA, continued fevers  12/17 now on NC-3.5L some concerns for secondary process will send off further testing, STEROIDs started  12/18 NC-3L, meeting criteria so REMDESIVIR started,

## 2020-12-18 NOTE — PROGRESS NOTE ADULT - SUBJECTIVE AND OBJECTIVE BOX
Date/Time Patient Seen:  		  Referring MD:   Data Reviewed	       Patient is a 88y old  Male who presents with a chief complaint of weakness, covid (17 Dec 2020 14:09)      Subjective/HPI     PAST MEDICAL & SURGICAL HISTORY:  Gout    Hyperlipidemia    Hypertension          Medication list         MEDICATIONS  (STANDING):  allopurinol 100 milliGRAM(s) Oral daily  ATENolol  Tablet 25 milliGRAM(s) Oral daily  atorvastatin 10 milliGRAM(s) Oral at bedtime  dexAMETHasone     Tablet 6 milliGRAM(s) Oral daily  dextrose 40% Gel 15 Gram(s) Oral once  dextrose 5%. 1000 milliLiter(s) (50 mL/Hr) IV Continuous <Continuous>  dextrose 5%. 1000 milliLiter(s) (100 mL/Hr) IV Continuous <Continuous>  dextrose 50% Injectable 25 Gram(s) IV Push once  dextrose 50% Injectable 12.5 Gram(s) IV Push once  dextrose 50% Injectable 25 Gram(s) IV Push once  enoxaparin Injectable 40 milliGRAM(s) SubCutaneous daily  glucagon  Injectable 1 milliGRAM(s) IntraMuscular once  insulin lispro (ADMELOG) corrective regimen sliding scale   SubCutaneous three times a day before meals  insulin lispro (ADMELOG) corrective regimen sliding scale   SubCutaneous at bedtime  memantine 5 milliGRAM(s) Oral daily  remdesivir  IVPB   IV Intermittent   remdesivir  IVPB 200 milliGRAM(s) IV Intermittent every 24 hours    MEDICATIONS  (PRN):  acetaminophen   Tablet .. 650 milliGRAM(s) Oral every 6 hours PRN Temp greater or equal to 38C (100.4F), Mild Pain (1 - 3)  ondansetron Injectable 4 milliGRAM(s) IV Push every 6 hours PRN Nausea and/or Vomiting         Vitals log        ICU Vital Signs Last 24 Hrs  T(C): 38.1 (18 Dec 2020 05:05), Max: 38.1 (18 Dec 2020 05:05)  T(F): 100.6 (18 Dec 2020 05:05), Max: 100.6 (18 Dec 2020 05:05)  HR: 109 (18 Dec 2020 05:05) (94 - 109)  BP: 139/75 (18 Dec 2020 05:05) (103/68 - 148/63)  BP(mean): --  ABP: --  ABP(mean): --  RR: 19 (18 Dec 2020 05:05) (19 - 19)  SpO2: 90% (18 Dec 2020 05:05) (90% - 92%)           Input and Output:  I&O's Detail    17 Dec 2020 07:01  -  18 Dec 2020 07:00  --------------------------------------------------------  IN:  Total IN: 0 mL    OUT:    Voided (mL): 750 mL  Total OUT: 750 mL    Total NET: -750 mL          Lab Data                        12.3   4.87  )-----------( 94       ( 16 Dec 2020 10:13 )             34.6     12-16    137  |  105  |  27<H>  ----------------------------<  147<H>  3.7   |  23  |  1.30    Ca    7.8<L>      16 Dec 2020 10:13  Mg     1.9     12-18    TPro  5.9<L>  /  Alb  2.4<L>  /  TBili  1.2  /  DBili  x   /  AST  33  /  ALT  24  /  AlkPhos  108  12-16            Review of Systems	      Objective     Physical Examination    heart s1s2  lung dec BS  abd soft      Pertinent Lab findings & Imaging      Tariq:  NO   Adequate UO     I&O's Detail    17 Dec 2020 07:01  -  18 Dec 2020 07:00  --------------------------------------------------------  IN:  Total IN: 0 mL    OUT:    Voided (mL): 750 mL  Total OUT: 750 mL    Total NET: -750 mL               Discussed with:     Cultures:	        Radiology

## 2020-12-19 DIAGNOSIS — E87.6 HYPOKALEMIA: ICD-10-CM

## 2020-12-19 LAB
ALBUMIN SERPL ELPH-MCNC: 1.9 G/DL — LOW (ref 3.3–5)
ALP SERPL-CCNC: 135 U/L — HIGH (ref 40–120)
ALT FLD-CCNC: 31 U/L — SIGNIFICANT CHANGE UP (ref 12–78)
AST SERPL-CCNC: 42 U/L — HIGH (ref 15–37)
BASOPHILS # BLD AUTO: 0.01 K/UL — SIGNIFICANT CHANGE UP (ref 0–0.2)
BASOPHILS NFR BLD AUTO: 0.1 % — SIGNIFICANT CHANGE UP (ref 0–2)
BILIRUB DIRECT SERPL-MCNC: 1.1 MG/DL — HIGH (ref 0.05–0.2)
BILIRUB INDIRECT FLD-MCNC: 0.6 MG/DL — SIGNIFICANT CHANGE UP (ref 0.2–1)
BILIRUB SERPL-MCNC: 1.7 MG/DL — HIGH (ref 0.2–1.2)
CREAT SERPL-MCNC: 1.7 MG/DL — HIGH (ref 0.5–1.3)
EOSINOPHIL # BLD AUTO: 0 K/UL — SIGNIFICANT CHANGE UP (ref 0–0.5)
EOSINOPHIL NFR BLD AUTO: 0 % — SIGNIFICANT CHANGE UP (ref 0–6)
HCT VFR BLD CALC: 39.2 % — SIGNIFICANT CHANGE UP (ref 39–50)
HGB BLD-MCNC: 13.9 G/DL — SIGNIFICANT CHANGE UP (ref 13–17)
IMM GRANULOCYTES NFR BLD AUTO: 0.7 % — SIGNIFICANT CHANGE UP (ref 0–1.5)
LYMPHOCYTES # BLD AUTO: 0.65 K/UL — LOW (ref 1–3.3)
LYMPHOCYTES # BLD AUTO: 6.5 % — LOW (ref 13–44)
MCHC RBC-ENTMCNC: 30.7 PG — SIGNIFICANT CHANGE UP (ref 27–34)
MCHC RBC-ENTMCNC: 35.5 GM/DL — SIGNIFICANT CHANGE UP (ref 32–36)
MCV RBC AUTO: 86.5 FL — SIGNIFICANT CHANGE UP (ref 80–100)
MONOCYTES # BLD AUTO: 0.41 K/UL — SIGNIFICANT CHANGE UP (ref 0–0.9)
MONOCYTES NFR BLD AUTO: 4.1 % — SIGNIFICANT CHANGE UP (ref 2–14)
NEUTROPHILS # BLD AUTO: 8.92 K/UL — HIGH (ref 1.8–7.4)
NEUTROPHILS NFR BLD AUTO: 88.6 % — HIGH (ref 43–77)
NRBC # BLD: 0 /100 WBCS — SIGNIFICANT CHANGE UP (ref 0–0)
PLATELET # BLD AUTO: 184 K/UL — SIGNIFICANT CHANGE UP (ref 150–400)
PROT SERPL-MCNC: 5.8 G/DL — LOW (ref 6–8.3)
RBC # BLD: 4.53 M/UL — SIGNIFICANT CHANGE UP (ref 4.2–5.8)
RBC # FLD: 12.8 % — SIGNIFICANT CHANGE UP (ref 10.3–14.5)
WBC # BLD: 10.06 K/UL — SIGNIFICANT CHANGE UP (ref 3.8–10.5)
WBC # FLD AUTO: 10.06 K/UL — SIGNIFICANT CHANGE UP (ref 3.8–10.5)

## 2020-12-19 RX ORDER — DEXAMETHASONE 0.5 MG/5ML
1 ELIXIR ORAL
Qty: 0 | Refills: 0 | DISCHARGE
Start: 2020-12-19

## 2020-12-19 RX ORDER — ENOXAPARIN SODIUM 100 MG/ML
0 INJECTION SUBCUTANEOUS
Qty: 0 | Refills: 0 | DISCHARGE
Start: 2020-12-19

## 2020-12-19 RX ORDER — DRONABINOL 2.5 MG
1 CAPSULE ORAL
Qty: 0 | Refills: 0 | DISCHARGE
Start: 2020-12-19

## 2020-12-19 RX ORDER — ACETAMINOPHEN 500 MG
2 TABLET ORAL
Qty: 0 | Refills: 0 | DISCHARGE
Start: 2020-12-19

## 2020-12-19 RX ORDER — POTASSIUM CHLORIDE 20 MEQ
10 PACKET (EA) ORAL DAILY
Refills: 0 | Status: DISCONTINUED | OUTPATIENT
Start: 2020-12-19 | End: 2020-12-20

## 2020-12-19 RX ADMIN — Medication 2.5 MILLIGRAM(S): at 17:22

## 2020-12-19 RX ADMIN — Medication 100 MILLIGRAM(S): at 11:45

## 2020-12-19 RX ADMIN — ATORVASTATIN CALCIUM 10 MILLIGRAM(S): 80 TABLET, FILM COATED ORAL at 22:21

## 2020-12-19 RX ADMIN — Medication 6 MILLIGRAM(S): at 06:31

## 2020-12-19 RX ADMIN — REMDESIVIR 500 MILLIGRAM(S): 5 INJECTION INTRAVENOUS at 12:32

## 2020-12-19 RX ADMIN — MEMANTINE HYDROCHLORIDE 5 MILLIGRAM(S): 10 TABLET ORAL at 11:45

## 2020-12-19 RX ADMIN — Medication 10 MILLIEQUIVALENT(S): at 15:04

## 2020-12-19 RX ADMIN — Medication 2.5 MILLIGRAM(S): at 06:31

## 2020-12-19 RX ADMIN — Medication 2: at 12:39

## 2020-12-19 RX ADMIN — Medication 2: at 17:22

## 2020-12-19 RX ADMIN — ENOXAPARIN SODIUM 40 MILLIGRAM(S): 100 INJECTION SUBCUTANEOUS at 11:44

## 2020-12-19 RX ADMIN — ATENOLOL 25 MILLIGRAM(S): 25 TABLET ORAL at 06:31

## 2020-12-19 NOTE — CHART NOTE - NSCHARTNOTEFT_GEN_A_CORE
Called by RN for patient desaturating to mid to low 80s and choking on 10 ml of water. Patient continues to cough without improvement in saturations and placed on Ventimask 50%. Oxygenation improved to 91% on venturi mask.  Patient seen at bedside, offers no complaints, in no acute distress on ventimask, breathing is non labored, lungs decreased breath sounds bilaterally, pulses regular rate and rhythm.       Hypoxemia Concerning  for possible aspiration vs worsening COVID symptoms  - Aspiration precautions and placed on NPO except medications for reevaluation with possible speech and swallow in the AM  - Stable oxygenating well on Venti Mask  - ordered STAT D-Dimer, ESR, CRP, Procal, Ferritin due to concern of worsening severity of disease  - Will follow, RN to call with status changes Called by RN for patient desaturating to mid to low 80s and choking on 10 ml of water. Patient continues to cough without improvement in saturations and placed on Ventimask 50%. Oxygenation improved to 91% on venturi mask.  Patient seen at bedside, offers no complaints,    physical   Gen: no acute distress on Ventimask,   lungs breathing is non labored,  decreased breath sounds bilaterally,   pulses regular rate and rhythm.       89 yo M with HTN DM HLD dementia (walks with cane) who p/w gen weakness x past several days. fever, recently diagnosed with COVID sent to hospital as family can no longer care for this patient at home - 12/14 first COVID+ PCR    Hypoxemia, concerning  for possible aspiration event vs worsening COVID disease  - Aspiration precautions and placed on NPO except medications for reevaluation with possible speech and swallow in the AM  - Stable oxygenating well on Venti Mask  - ordered STAT D-Dimer, ESR, CRP, Procal, Ferritin due to concern of worsening severity of disease  - Ordered continuous pulse ox and telemonitor  - Will follow, RN to call with status changes    ADDENDUM    Called by RN for HRs 114-160 while patient asleep. EKG ordered.   -Lab results pending Called by RN for patient desaturating to mid to low 80s and choking on 10 ml of water. Patient continues to cough without improvement in saturations and placed on Ventimask 50%. Oxygenation improved to 91% on venturi mask.  Patient seen at bedside, offers no complaints,    physical   Gen: no acute distress on Ventimask,   lungs breathing is non labored,  decreased breath sounds bilaterally,   pulses regular rate and rhythm.       89 yo M with HTN DM HLD dementia (walks with cane) who p/w gen weakness x past several days. fever, recently diagnosed with COVID sent to hospital as family can no longer care for this patient at home - 12/14 first COVID+ PCR    Hypoxemia, concerning  for possible aspiration event vs worsening COVID disease  - Aspiration precautions and placed on NPO except medications for reevaluation with possible speech and swallow in the AM  - Stable oxygenating well on Venti Mask  - ordered STAT D-Dimer, ESR, CRP, Procal, Ferritin due to concern of worsening severity of disease  - Ordered continuous pulse ox and telemonitor  - Will follow, RN to call with status changes    ADDENDUM    Called by RN for HRs 114-160 while patient asleep.   - STAT EKG ordered.   - EKG on admission showed NSR with PVCs  - Inflammatory Lab results pending Called by RN for patient desaturating to mid to low 80s and choking on 10 ml of water. Patient continues to cough without improvement in saturations and placed on Ventimask 50%. Oxygenation improved to 91% on venturi mask.  Patient seen at bedside, offers no complaints,    physical   Gen: no acute distress on Ventimask,   lungs breathing is non labored,  decreased breath sounds bilaterally,   pulses regular rate and rhythm.   Neuro: awake, alert oriented to self and situation, answers appropriately      89 yo M with HTN DM HLD dementia (walks with cane) who p/w gen weakness x past several days. fever, recently diagnosed with COVID sent to hospital as family can no longer care for this patient at home - 12/14 first COVID+ PCR    Hypoxemia, concerning  for possible aspiration event vs worsening COVID disease  - Aspiration precautions and placed on NPO except medications for reevaluation with possible speech and swallow in the AM  - Stable oxygenating well on Venti Mask  - ordered STAT D-Dimer, ESR, CRP, Procal, Ferritin due to concern of worsening severity of disease  - Ordered continuous pulse ox and telemonitor  - Will follow, RN to call with status changes    ADDENDUM    Called by RN for HRs 114-160's while patient asleep. Patient seen and examined at bedside. Denies CP, palpitations, SOB, dizziness. epigastric pain.   - STAT EKG ordered, showing new onset Afib with  , /67   - EKG on admission showed NSR with PVCs  - Ordered STAT lopressor 10 mg IVP , HR decreased to 140;s, Ordered another Lopressor 10 mg IVP  - Ordered Lovenox 80 mg full anticoagulation dose in setting Atrial fibrillation and COVID infection.   - Inflammatory Lab results still pending  - Consulted Cardiology [facility] for AM Called by RN for patient desaturating to mid to low 80s and choking on 10 ml of water. Patient continues to cough without improvement in saturations and placed on Ventimask 50%. Oxygenation improved to 91% on venturi mask.  Patient seen at bedside, offers no complaints,    physical   Gen: no acute distress on Ventimask,   lungs breathing is non labored,  decreased breath sounds bilaterally,   pulses regular rate and rhythm.   Neuro: awake, alert oriented to self and situation, answers appropriately      87 yo M with HTN DM HLD dementia (walks with cane) who p/w gen weakness x past several days. fever, recently diagnosed with COVID sent to hospital as family can no longer care for this patient at home - 12/14 first COVID+ PCR    Hypoxemia, concerning  for possible aspiration event vs worsening COVID disease  - Aspiration precautions and placed on NPO except medications for reevaluation with possible speech and swallow in the AM  - Stable oxygenating well on Venti Mask  - ordered STAT D-Dimer, ESR, CRP, Procal, Ferritin due to concern of worsening severity of disease  - Ordered continuous pulse ox and telemonitor  - Will follow, RN to call with status changes    ADDENDUM    Called by RN for HRs 114-160's while patient asleep. Patient seen and examined at bedside. Denies CP, palpitations, SOB, dizziness. epigastric pain.   - STAT EKG ordered, showing new onset Afib with  , /67   - EKG on admission showed NSR with PVCs  - Ordered STAT lopressor 10 mg IVP , HR continued 140-170s, Patient unable to receive further BB or CC due to BP 92/60  - Ordered Lovenox 80 mg full anticoagulation dose in setting Atrial fibrillation and COVID infection  - Inflammatory markers reviewed, stable, awaiting D-Dimer  - Consulted Cardiology [facility], Dr. Lopez, advised Amioderone IVP followed by Amio oral loading dose  - STAT Amioderone  mg given.  - Will continue to follow patient. RN to call with status changes Called by RN for patient desaturating to mid to low 80s and choking on 10 ml of water. Patient continues to cough without improvement in saturations and placed on Ventimask 50%. Oxygenation improved to 91% on venturi mask.  Patient seen at bedside, offers no complaints,    physical   Gen: no acute distress on Ventimask,   lungs breathing is non labored,  decreased breath sounds bilaterally,   pulses regular rate and rhythm.   Neuro: awake, alert oriented to self and situation, answers appropriately      89 yo M with HTN DM HLD dementia (walks with cane) who p/w gen weakness x past several days. fever, recently diagnosed with COVID sent to hospital as family can no longer care for this patient at home - 12/14 first COVID+ PCR    Hypoxemia, concerning  for possible aspiration event vs worsening COVID disease  - Aspiration precautions and placed on NPO except medications for reevaluation with possible speech and swallow in the AM  - Stable oxygenating well on Venti Mask  - ordered STAT D-Dimer, ESR, CRP, Procal, Ferritin due to concern of worsening severity of disease  - Ordered continuous pulse ox and telemonitor  - Will follow, RN to call with status changes    ADDENDUM    Called by RN for HRs 114-160's while patient asleep. Patient seen and examined at bedside. Denies CP, palpitations, SOB, dizziness. epigastric pain.   - STAT EKG ordered, showing new onset Afib with  , /67   - EKG on admission showed NSR with PVCs  - Ordered STAT lopressor 10 mg IVP , HR continued 140-170s, Patient unable to receive further BB or CC due to BP 92/60  - Ordered Lovenox 80 mg full anticoagulation dose in setting Atrial fibrillation and COVID infection  - Inflammatory markers reviewed, stable, awaiting D-Dimer  - Consulted Cardiology [facility], Dr. Lopez, advised Amiodarone 150 mg IVP followed by Amio oral loading dose  - STAT Amiodarone  mg given  - Will continue to follow patient. RN to call with status changes

## 2020-12-19 NOTE — PROGRESS NOTE ADULT - SUBJECTIVE AND OBJECTIVE BOX
Patient is a 88y old  Male who presents with a chief complaint of weakness, covid (19 Dec 2020 07:43)      INTERVAL /OVERNIGHT EVENTS: denies complaints    MEDICATIONS  (STANDING):  allopurinol 100 milliGRAM(s) Oral daily  ATENolol  Tablet 25 milliGRAM(s) Oral daily  atorvastatin 10 milliGRAM(s) Oral at bedtime  dexAMETHasone     Tablet 6 milliGRAM(s) Oral daily  dextrose 40% Gel 15 Gram(s) Oral once  dextrose 5%. 1000 milliLiter(s) (50 mL/Hr) IV Continuous <Continuous>  dextrose 5%. 1000 milliLiter(s) (100 mL/Hr) IV Continuous <Continuous>  dextrose 50% Injectable 25 Gram(s) IV Push once  dextrose 50% Injectable 12.5 Gram(s) IV Push once  dextrose 50% Injectable 25 Gram(s) IV Push once  dronabinol 2.5 milliGRAM(s) Oral two times a day  enoxaparin Injectable 40 milliGRAM(s) SubCutaneous daily  glucagon  Injectable 1 milliGRAM(s) IntraMuscular once  insulin lispro (ADMELOG) corrective regimen sliding scale   SubCutaneous three times a day before meals  insulin lispro (ADMELOG) corrective regimen sliding scale   SubCutaneous at bedtime  memantine 5 milliGRAM(s) Oral daily  potassium chloride    Tablet ER 10 milliEquivalent(s) Oral daily  remdesivir  IVPB 100 milliGRAM(s) IV Intermittent every 24 hours    MEDICATIONS  (PRN):  acetaminophen   Tablet .. 650 milliGRAM(s) Oral every 6 hours PRN Temp greater or equal to 38C (100.4F), Mild Pain (1 - 3)  ondansetron Injectable 4 milliGRAM(s) IV Push every 6 hours PRN Nausea and/or Vomiting      Allergies    penicillins (Unknown)  sulfa drugs (Unknown)    Intolerances        REVIEW OF SYSTEMS:  denies    Vital Signs Last 24 Hrs  T(C): 36.8 (19 Dec 2020 08:00), Max: 36.8 (19 Dec 2020 08:00)  T(F): 98.3 (19 Dec 2020 08:00), Max: 98.3 (19 Dec 2020 08:00)  HR: 65 (19 Dec 2020 08:00) (65 - 97)  BP: 104/73 (19 Dec 2020 08:00) (103/65 - 104/73)  BP(mean): --  RR: 20 (19 Dec 2020 08:00) (19 - 20)  SpO2: 90% (19 Dec 2020 08:00) (90% - 91%)    PHYSICAL EXAM:  GENERAL: NAD, well-groomed, well-developed  HEAD:  Atraumatic, Normocephalic  EYES: EOMI, PERRLA, conjunctiva and sclera clear  ENMT: No tonsillar erythema, exudates, or enlargement; Moist mucous membranes, Good dentition, No lesions  NECK: Supple, No JVD, Normal thyroid  NERVOUS SYSTEM:  Alert & awake; Motor Strength 5/5 B/L upper and lower extremities; DTRs 2+ intact and symmetric  CHEST/LUNG: Clear to auscultation bilaterally; No rales, rhonchi, wheezing, or rubs  HEART: Regular rate and rhythm; No murmurs, rubs, or gallops  ABDOMEN: Soft, Nontender, Nondistended; Bowel sounds present  EXTREMITIES:  2+ Peripheral Pulses, No clubbing, cyanosis, or edema  LYMPH: No lymphadenopathy noted  SKIN: No rashes or lesions    LABS:                        13.9   10.06 )-----------( 184      ( 19 Dec 2020 07:29 )             39.2     19 Dec 2020 07:29    x      |  x      |  x      ----------------------------<  x      x       |  x      |  1.70     Ca    8.0        18 Dec 2020 09:59    TPro  5.8    /  Alb  1.9    /  TBili  1.7    /  DBili  1.10   /  AST  42     /  ALT  31     /  AlkPhos  135    19 Dec 2020 07:29        CAPILLARY BLOOD GLUCOSE      POCT Blood Glucose.: 221 mg/dL (19 Dec 2020 12:17)  POCT Blood Glucose.: 164 mg/dL (18 Dec 2020 21:38)  POCT Blood Glucose.: 182 mg/dL (18 Dec 2020 16:48)      RADIOLOGY & ADDITIONAL TESTS:    Notes Reviewed:  [x ] YES  [ ] NO    Care Discussed with Consultants/Other Providers [x ] YES  [ ] NO

## 2020-12-19 NOTE — PROGRESS NOTE ADULT - SUBJECTIVE AND OBJECTIVE BOX
[facility] DIVISION of INFECTIOUS DISEASE  Denver Lew MD PhD, Maxine Rodriguez MD, Anahy Syed MD, Jelena Regalado MD  and providing coverage with Katherine Martinez MD and Mala Bray MD  Providing Infectious Disease Consultations at [facility], [facility], [facility]'s      Office# [phone] to schedule follow up appointments  Answering Service for urgent calls or New Consults [phone]    Infectious Diseases Progress Note:    SPENCER LEVY is a 88y year old Male patient    COVID-19 Patient    Allergies: penicillins (Unknown)  sulfa drugs (Unknown)      ANTIBIOTICS/RELEVANT:  Antimicrobials  remdesivir  IVPB 100 milliGRAM(s) IV Intermittent every 24 hours    Immunologic:    Other Meds:  acetaminophen   Tablet .. 650 milliGRAM(s) Oral every 6 hours PRN  allopurinol 100 milliGRAM(s) Oral daily  ATENolol  Tablet 25 milliGRAM(s) Oral daily  atorvastatin 10 milliGRAM(s) Oral at bedtime  dexAMETHasone     Tablet 6 milliGRAM(s) Oral daily  dextrose 40% Gel 15 Gram(s) Oral once  dextrose 5%. 1000 milliLiter(s) IV Continuous <Continuous>  dextrose 5%. 1000 milliLiter(s) IV Continuous <Continuous>  dextrose 50% Injectable 25 Gram(s) IV Push once  dextrose 50% Injectable 12.5 Gram(s) IV Push once  dextrose 50% Injectable 25 Gram(s) IV Push once  dronabinol 2.5 milliGRAM(s) Oral two times a day  enoxaparin Injectable 40 milliGRAM(s) SubCutaneous daily  glucagon  Injectable 1 milliGRAM(s) IntraMuscular once  insulin lispro (ADMELOG) corrective regimen sliding scale   SubCutaneous three times a day before meals  insulin lispro (ADMELOG) corrective regimen sliding scale   SubCutaneous at bedtime  memantine 5 milliGRAM(s) Oral daily  ondansetron Injectable 4 milliGRAM(s) IV Push every 6 hours PRN  potassium chloride    Tablet ER 10 milliEquivalent(s) Oral daily      Objective:  Vital Signs Last 24 Hrs  T(F): 97.5 (19 Dec 2020 16:06), Max: 98.3 (19 Dec 2020 08:00)  HR: 75 (19 Dec 2020 16:06) (65 - 75)  BP: 120/72 (19 Dec 2020 16:06) (103/65 - 120/72)  RR: 19 (19 Dec 2020 16:06) (19 - 20)  SpO2: 91% (19 Dec 2020 16:06) (90% - 91%)  T(C): 36.4 (12-19-20 @ 16:06), Max: 38.1 (12-18-20 @ 05:05)  T(C): 36.4 (12-19-20 @ 16:06), Max: 39.4 (12-16-20 @ 22:18)  T(C): 36.4 (12-19-20 @ 16:06), Max: 39.4 (12-16-20 @ 22:18)    PHYSICAL EXAM:  HEENT: NC/AT, anicteric, supple  Respiratory: no accessory muscle use, breathing comfortably  Cardiovascular: S1 S2 present, normal rate  Gastrointestinal: normal appearing, nondistended  Extremities: no edema, no cyanosis      LABS:                        13.9   10.06 )-----------( 184      ( 19 Dec 2020 07:29 )             39.2     10.06 12-19 @ 07:29  9.34 12-18 @ 09:59  8.33 12-18 @ 07:31  4.87 12-16 @ 10:13  3.31 12-15 @ 06:00  4.14 12-14 @ 12:44    12-19    x   |  x   |  x   ----------------------------<  x   x    |  x   |  1.70<H>    Ca    8.0<L>      18 Dec 2020 09:59  Mg     1.9     12-18    TPro  5.8<L>  /  Alb  1.9<L>  /  TBili  1.7<H>  /  DBili  1.10<H>  /  AST  42<H>  /  ALT  31  /  AlkPhos  135<H>  12-19    Creatinine, Serum: 1.70 mg/dL (12-19-20 @ 07:29)  Creatinine, Serum: 1.40 mg/dL (12-18-20 @ 09:59)  Creatinine, Serum: 1.30 mg/dL (12-16-20 @ 10:13)  Creatinine, Serum: 1.30 mg/dL (12-15-20 @ 06:00)  Creatinine, Serum: 1.40 mg/dL (12-14-20 @ 12:44)    COVID RISK SCORE  Auto Neutrophil #: 8.92 K/uL (12-19-20 @ 07:29)  Auto Neutrophil #: 7.76 K/uL (12-18-20 @ 07:31)  Auto Neutrophil #: 4.13 K/uL (12-16-20 @ 10:13)  Auto Neutrophil #: 2.56 K/uL (12-15-20 @ 06:00)  Auto Neutrophil #: 3.47 K/uL (12-14-20 @ 12:44)    Auto Lymphocyte #: 0.65 K/uL (12-19-20 @ 07:29)  Auto Lymphocyte #: 0.29 K/uL (12-18-20 @ 07:31)  Auto Lymphocyte #: 0.50 K/uL (12-16-20 @ 10:13)  Auto Lymphocyte #: 0.57 K/uL (12-15-20 @ 06:00)  Auto Lymphocyte #: 0.46 K/uL (12-14-20 @ 12:44)    Procalcitonin, Serum: 0.49 ng/mL (12-18-20 @ 07:31)  Procalcitonin, Serum: 0.13 ng/mL (12-15-20 @ 06:00)  Procalcitonin, Serum: 0.13 ng/mL (12-14-20 @ 12:44)    Ferritin, Serum: 1748 ng/mL (12-18-20 @ 11:03)  Ferritin, Serum: 904 ng/mL (12-16-20 @ 15:28)  Ferritin, Serum: 664 ng/mL (12-15-20 @ 09:17)  Ferritin, Serum: 640 ng/mL (12-14-20 @ 17:41)    D-Dimer Assay, Quantitative: 1198 ng/mL DDU (12-18-20 @ 07:31)  D-Dimer Assay, Quantitative: 1774 ng/mL DDU (12-16-20 @ 10:13)  D-Dimer Assay, Quantitative: 931 ng/mL DDU (12-15-20 @ 06:00)  D-Dimer Assay, Quantitative: 3136 ng/mL DDU (12-14-20 @ 12:44)    Sedimentation Rate, Erythrocyte: 25 mm/hr (12-15-20 @ 06:00)    Creatine Kinase, Serum: 253 U/L (12-15-20 @ 06:00)  Creatine Kinase, Serum: 273 U/L (12-14-20 @ 12:44)    Troponin I, Serum: .015 ng/mL (12-14-20 @ 12:44)    Lactate, Blood: 1.1 mmol/L (12-15-20 @ 06:00)  Lactate, Blood: 2.2 mmol/L (12-14-20 @ 12:44)    Prothrombin Time, Plasma: 12.4 sec (12-14-20 @ 12:44)    Activated Partial Thromboplastin Time: 30.0 sec (12-14-20 @ 12:44)    MICROBIOLOGY: reviewed  Blood Culture Results:   No growth to date. (12-17 @ 18:06)  Blood Culture Results:   No growth to date. (12-17 @ 18:06)    RADIOLOGY & ADDITIONAL STUDIES: reviewed

## 2020-12-19 NOTE — PROGRESS NOTE ADULT - SUBJECTIVE AND OBJECTIVE BOX
Date/Time Patient Seen:  		  Referring MD:   Data Reviewed	       Patient is a 88y old  Male who presents with a chief complaint of weakness, covid (18 Dec 2020 15:00)      Subjective/HPI     PAST MEDICAL & SURGICAL HISTORY:  Gout    Hyperlipidemia    Hypertension          Medication list         MEDICATIONS  (STANDING):  allopurinol 100 milliGRAM(s) Oral daily  ATENolol  Tablet 25 milliGRAM(s) Oral daily  atorvastatin 10 milliGRAM(s) Oral at bedtime  dexAMETHasone     Tablet 6 milliGRAM(s) Oral daily  dextrose 40% Gel 15 Gram(s) Oral once  dextrose 5%. 1000 milliLiter(s) (50 mL/Hr) IV Continuous <Continuous>  dextrose 5%. 1000 milliLiter(s) (100 mL/Hr) IV Continuous <Continuous>  dextrose 50% Injectable 25 Gram(s) IV Push once  dextrose 50% Injectable 12.5 Gram(s) IV Push once  dextrose 50% Injectable 25 Gram(s) IV Push once  dronabinol 2.5 milliGRAM(s) Oral two times a day  enoxaparin Injectable 40 milliGRAM(s) SubCutaneous daily  glucagon  Injectable 1 milliGRAM(s) IntraMuscular once  insulin lispro (ADMELOG) corrective regimen sliding scale   SubCutaneous three times a day before meals  insulin lispro (ADMELOG) corrective regimen sliding scale   SubCutaneous at bedtime  memantine 5 milliGRAM(s) Oral daily  remdesivir  IVPB 100 milliGRAM(s) IV Intermittent every 24 hours    MEDICATIONS  (PRN):  acetaminophen   Tablet .. 650 milliGRAM(s) Oral every 6 hours PRN Temp greater or equal to 38C (100.4F), Mild Pain (1 - 3)  ondansetron Injectable 4 milliGRAM(s) IV Push every 6 hours PRN Nausea and/or Vomiting         Vitals log        ICU Vital Signs Last 24 Hrs  T(C): 36.3 (19 Dec 2020 05:18), Max: 36.9 (18 Dec 2020 07:55)  T(F): 97.4 (19 Dec 2020 05:18), Max: 98.5 (18 Dec 2020 07:55)  HR: 73 (19 Dec 2020 05:18) (73 - 97)  BP: 103/65 (19 Dec 2020 05:18) (96/57 - 104/67)  BP(mean): --  ABP: --  ABP(mean): --  RR: 19 (19 Dec 2020 05:18) (19 - 19)  SpO2: 90% (19 Dec 2020 05:18) (90% - 91%)           Input and Output:  I&O's Detail      Lab Data                        13.9   10.06 )-----------( x        ( 19 Dec 2020 07:29 )             39.2     12-18    134<L>  |  102  |  31<H>  ----------------------------<  162<H>  3.4<L>   |  19<L>  |  1.40<H>    Ca    8.0<L>      18 Dec 2020 09:59  Mg     1.9     12-18    TPro  5.5<L>  /  Alb  2.0<L>  /  TBili  1.8<H>  /  DBili  1.30<H>  /  AST  41<H>  /  ALT  31  /  AlkPhos  127<H>  12-18            Review of Systems	      Objective     Physical Examination    heart s1s2  lung dec BS  abd soft      Pertinent Lab findings & Imaging      Tariq:  NO   Adequate UO     I&O's Detail           Discussed with:     Cultures:	        Radiology

## 2020-12-19 NOTE — PROVIDER CONTACT NOTE (CHANGE IN STATUS NOTIFICATION) - ASSESSMENT
Pt desated to 87% on 4L NC. Pt A&Ox2, removes nasal cannula at times. Pt denies SOB, CP/CD, lightheadedness/dizziness. No s/s of acute distress.

## 2020-12-19 NOTE — PROVIDER CONTACT NOTE (CHANGE IN STATUS NOTIFICATION) - ACTION/TREATMENT ORDERED:
Pt reoriented and education provided. HOB elevated. Pt titrated up to 50% venti mask, O2% now 91%. Pt now complying with leaving venti mask in place. Dr. Rodriguez made aware. Will continue to assess and monitor Dr. Rodriguez made aware. Pt reoriented and education provided. HOB elevated. Pt titrated up to 50% venti mask, O2% now 91%. Pt now complying with leaving venti mask in place.

## 2020-12-19 NOTE — PROVIDER CONTACT NOTE (CHANGE IN STATUS NOTIFICATION) - ACTION/TREATMENT ORDERED:
Dr. Rodriguez made aware, aspiration precautions maintained and in place. Bedside dysphagia screening ordered, will perform Dr. Rodriguez made aware, aspiration precautions maintained and in place. Dr. Rodriugez made aware, aspiration precautions maintained and in place. Diet modified to NPO except medications.

## 2020-12-19 NOTE — PROGRESS NOTE ADULT - SUBJECTIVE AND OBJECTIVE BOX
Patient is a 88y old  Male who presents with a chief complaint of weakness, covid (18 Dec 2020 10:34)      Subjective: Patient seen and examined at bedside. No acute events overnight.     PAIN: N  DYSPNEA: N	  NAUS/VOM: 	N  SECRETIONS: 	N  AGITATION: N    OTHER REVIEW OF SYSTEMS: negative    Vital Signs Last 24 Hrs  T(C): 36.8 (19 Dec 2020 08:00), Max: 36.8 (19 Dec 2020 08:00)  T(F): 98.3 (19 Dec 2020 08:00), Max: 98.3 (19 Dec 2020 08:00)  HR: 65 (19 Dec 2020 08:00) (65 - 97)  BP: 104/73 (19 Dec 2020 08:00) (103/65 - 104/73)  BP(mean): --  RR: 20 (19 Dec 2020 08:00) (19 - 20)  SpO2: 90% (19 Dec 2020 08:00) (90% - 91%)    12-19    x   |  x   |  x   ----------------------------<  x   x    |  x   |  1.70<H>    Ca    8.0<L>      18 Dec 2020 09:59  Mg     1.9     12-18    TPro  5.8<L>  /  Alb  1.9<L>  /  TBili  1.7<H>  /  DBili  1.10<H>  /  AST  42<H>  /  ALT  31  /  AlkPhos  135<H>  12-19                          13.9   10.06 )-----------( 184      ( 19 Dec 2020 07:29 )             39.2       CAPILLARY BLOOD GLUCOSE      POCT Blood Glucose.: 221 mg/dL (19 Dec 2020 12:17)  POCT Blood Glucose.: 164 mg/dL (18 Dec 2020 21:38)  POCT Blood Glucose.: 182 mg/dL (18 Dec 2020 16:48)              acetaminophen   Tablet .. 650 milliGRAM(s) Oral every 6 hours PRN  allopurinol 100 milliGRAM(s) Oral daily  ATENolol  Tablet 25 milliGRAM(s) Oral daily  atorvastatin 10 milliGRAM(s) Oral at bedtime  dexAMETHasone     Tablet 6 milliGRAM(s) Oral daily  dextrose 40% Gel 15 Gram(s) Oral once  dextrose 5%. 1000 milliLiter(s) IV Continuous <Continuous>  dextrose 5%. 1000 milliLiter(s) IV Continuous <Continuous>  dextrose 50% Injectable 25 Gram(s) IV Push once  dextrose 50% Injectable 12.5 Gram(s) IV Push once  dextrose 50% Injectable 25 Gram(s) IV Push once  dronabinol 2.5 milliGRAM(s) Oral two times a day  enoxaparin Injectable 40 milliGRAM(s) SubCutaneous daily  glucagon  Injectable 1 milliGRAM(s) IntraMuscular once  insulin lispro (ADMELOG) corrective regimen sliding scale   SubCutaneous three times a day before meals  insulin lispro (ADMELOG) corrective regimen sliding scale   SubCutaneous at bedtime  memantine 5 milliGRAM(s) Oral daily  ondansetron Injectable 4 milliGRAM(s) IV Push every 6 hours PRN  potassium chloride    Tablet ER 10 milliEquivalent(s) Oral daily  remdesivir  IVPB 100 milliGRAM(s) IV Intermittent every 24 hours      GENERAL:  resting comfortably in NAD  HEENT:  NC/AT EOMI PERRL  NECK: supple no JVD  CVS:  +S1 S2 RRR  RESP: CTA B/L  GI:  soft NT/ND +BS  : no suprapubic tenderness  MUSC:  no lower extremity edema  SKIN:  Warm, moist, no rashes   LYMPH: normal     MEDS REVIEWED	            ADVANCED DIRECTIVES:         DNR     DNI    MOLST    PSYCHOSOCIAL-SPIRITUAL ASSESSMENT:    ___Reviewed     ___Care  plan unchanged     ___Care plan adjusted as below    GOALS OF CARE DISCUSSION  	___Palliative care info/counseling provided	    ___Family meeting  	___Advanced Directives addressed	    ___See previous Palliative Medicine Note    AGENCY CHOICE DISCUSSED:   ___HOSPICE   ___CALVARY  ___OTHER:              > 50% OF THE TIME SPENT IN COUNSELING AND COORDINATING CARE 	    Minutes:      PROLONGED SERVICE             FACE TO FACE:    PT            PT & FAMILY	       Minutes:      Advance Care Planning Time:

## 2020-12-19 NOTE — PROVIDER CONTACT NOTE (CHANGE IN STATUS NOTIFICATION) - ASSESSMENT
Pt coughing, able to speak. No s/s of acute distress. Pt coughing, able to speak. No s/s of acute distress. Pt denies SOB

## 2020-12-19 NOTE — PROGRESS NOTE ADULT - ASSESSMENT
2.17.2020 This is an 88 M with dementia comes with COVID PNA. Patient requires help with ADLs. He lives with daughter and wife. His daughter is his HCP. Daughter reports that both her parents have advanced directives that state that they are a DNR/DNI. She would like to complete MOLST. MOLST was reviewed, witnessed and signed. DNR/DNI order entered into EMR.    12.18.2020 Patient still febrile and requiring oxygen. Poor po intake. On remdisivir and decadron.    12.19.2020 Afebrile x 24 hours. Continues to require oxygen.

## 2020-12-19 NOTE — PROGRESS NOTE ADULT - ASSESSMENT
87 yo M with HTN DM HLD dementia (walks with cane) who p/w gen weakness x past several days. fever, recently diagnosed with COVID sent to hospital as family can no longer care for this patient at home - 12/14 first COVID+ PCR    RECOMMENDATIONS  12/14 NLR 3.47/0.46=7.5 97% on RA, would NOT start steroid as pt sats fine, rec LMWH prophylactic dose, with no hypoxemia and unclear duration rec NO remdesivir  blood with what appears to be a contaminant, urine with <100k enterococcus  12/15 agree with stopping steroids, continue LMWH   12/16 NLR 4/0.5=8 blood cultures with what appear to be contaminant no further Rx, on RA, continued fevers  12/17 now on NC-3.5L some concerns for secondary process will send off further testing, STEROIDs started  12/18 NC-3L, meeting criteria so REMDESIVIR started  12/19 NLR 8.92/0.65=13.7, NC 4L, continue remdesivir, steroids, lovenox, monitor CBC with diff, inflammatory markers

## 2020-12-20 LAB
ALBUMIN SERPL ELPH-MCNC: 1.8 G/DL — LOW (ref 3.3–5)
ALP SERPL-CCNC: 128 U/L — HIGH (ref 40–120)
ALT FLD-CCNC: 91 U/L — HIGH (ref 12–78)
ANION GAP SERPL CALC-SCNC: 21 MMOL/L — HIGH (ref 5–17)
APTT BLD: 34.2 SEC — SIGNIFICANT CHANGE UP (ref 27.5–35.5)
AST SERPL-CCNC: 218 U/L — HIGH (ref 15–37)
BASOPHILS # BLD AUTO: 0 K/UL — SIGNIFICANT CHANGE UP (ref 0–0.2)
BASOPHILS NFR BLD AUTO: 0 % — SIGNIFICANT CHANGE UP (ref 0–2)
BILIRUB DIRECT SERPL-MCNC: 0.9 MG/DL — HIGH (ref 0.05–0.2)
BILIRUB INDIRECT FLD-MCNC: 1 MG/DL — SIGNIFICANT CHANGE UP (ref 0.2–1)
BILIRUB SERPL-MCNC: 1.9 MG/DL — HIGH (ref 0.2–1.2)
BUN SERPL-MCNC: 78 MG/DL — HIGH (ref 7–23)
CALCIUM SERPL-MCNC: 8.1 MG/DL — LOW (ref 8.5–10.1)
CHLORIDE SERPL-SCNC: 100 MMOL/L — SIGNIFICANT CHANGE UP (ref 96–108)
CK SERPL-CCNC: 298 U/L — SIGNIFICANT CHANGE UP (ref 26–308)
CO2 SERPL-SCNC: 12 MMOL/L — LOW (ref 22–31)
CREAT SERPL-MCNC: 3.2 MG/DL — HIGH (ref 0.5–1.3)
CRP SERPL-MCNC: 8.48 MG/DL — HIGH (ref 0–0.4)
D DIMER BLD IA.RAPID-MCNC: 2980 NG/ML DDU — HIGH
EOSINOPHIL # BLD AUTO: 0 K/UL — SIGNIFICANT CHANGE UP (ref 0–0.5)
EOSINOPHIL NFR BLD AUTO: 0 % — SIGNIFICANT CHANGE UP (ref 0–6)
ERYTHROCYTE [SEDIMENTATION RATE] IN BLOOD: 22 MM/HR — HIGH (ref 0–20)
GLUCOSE SERPL-MCNC: 177 MG/DL — HIGH (ref 70–99)
HCT VFR BLD CALC: 40 % — SIGNIFICANT CHANGE UP (ref 39–50)
HGB BLD-MCNC: 13.7 G/DL — SIGNIFICANT CHANGE UP (ref 13–17)
INR BLD: 1.08 RATIO — SIGNIFICANT CHANGE UP (ref 0.88–1.16)
LACTATE SERPL-SCNC: 10 MMOL/L — CRITICAL HIGH (ref 0.7–2)
LACTATE SERPL-SCNC: 10.7 MMOL/L — CRITICAL HIGH (ref 0.7–2)
LDH SERPL L TO P-CCNC: 882 U/L — HIGH (ref 50–242)
LYMPHOCYTES # BLD AUTO: 0.84 K/UL — LOW (ref 1–3.3)
LYMPHOCYTES # BLD AUTO: 9 % — LOW (ref 13–44)
MCHC RBC-ENTMCNC: 30.4 PG — SIGNIFICANT CHANGE UP (ref 27–34)
MCHC RBC-ENTMCNC: 34.3 GM/DL — SIGNIFICANT CHANGE UP (ref 32–36)
MCV RBC AUTO: 88.7 FL — SIGNIFICANT CHANGE UP (ref 80–100)
MONOCYTES # BLD AUTO: 0.09 K/UL — SIGNIFICANT CHANGE UP (ref 0–0.9)
MONOCYTES NFR BLD AUTO: 1 % — LOW (ref 2–14)
NEUTROPHILS # BLD AUTO: 8.02 K/UL — HIGH (ref 1.8–7.4)
NEUTROPHILS NFR BLD AUTO: 67 % — SIGNIFICANT CHANGE UP (ref 43–77)
NRBC # BLD: SIGNIFICANT CHANGE UP /100 WBCS (ref 0–0)
PLATELET # BLD AUTO: 248 K/UL — SIGNIFICANT CHANGE UP (ref 150–400)
POTASSIUM SERPL-MCNC: 4.9 MMOL/L — SIGNIFICANT CHANGE UP (ref 3.5–5.3)
POTASSIUM SERPL-SCNC: 4.9 MMOL/L — SIGNIFICANT CHANGE UP (ref 3.5–5.3)
PROCALCITONIN SERPL-MCNC: 1.54 NG/ML — HIGH (ref 0–0.04)
PROCALCITONIN SERPL-MCNC: 14.77 NG/ML — HIGH (ref 0–0.04)
PROT SERPL-MCNC: 5.6 G/DL — LOW (ref 6–8.3)
PROTHROM AB SERPL-ACNC: 12.6 SEC — SIGNIFICANT CHANGE UP (ref 10.6–13.6)
RBC # BLD: 4.51 M/UL — SIGNIFICANT CHANGE UP (ref 4.2–5.8)
RBC # FLD: 13.5 % — SIGNIFICANT CHANGE UP (ref 10.3–14.5)
SODIUM SERPL-SCNC: 133 MMOL/L — LOW (ref 135–145)
WBC # BLD: 9.32 K/UL — SIGNIFICANT CHANGE UP (ref 3.8–10.5)
WBC # FLD AUTO: 9.32 K/UL — SIGNIFICANT CHANGE UP (ref 3.8–10.5)

## 2020-12-20 PROCEDURE — 99291 CRITICAL CARE FIRST HOUR: CPT | Mod: CS

## 2020-12-20 PROCEDURE — 93970 EXTREMITY STUDY: CPT | Mod: 26

## 2020-12-20 PROCEDURE — 93010 ELECTROCARDIOGRAM REPORT: CPT

## 2020-12-20 PROCEDURE — 99291 CRITICAL CARE FIRST HOUR: CPT

## 2020-12-20 PROCEDURE — 71045 X-RAY EXAM CHEST 1 VIEW: CPT | Mod: 26

## 2020-12-20 RX ORDER — ENOXAPARIN SODIUM 100 MG/ML
80 INJECTION SUBCUTANEOUS DAILY
Refills: 0 | Status: DISCONTINUED | OUTPATIENT
Start: 2020-12-21 | End: 2020-12-20

## 2020-12-20 RX ORDER — DILTIAZEM HCL 120 MG
10 CAPSULE, EXT RELEASE 24 HR ORAL
Qty: 125 | Refills: 0 | Status: DISCONTINUED | OUTPATIENT
Start: 2020-12-20 | End: 2020-12-20

## 2020-12-20 RX ORDER — AMIODARONE HYDROCHLORIDE 400 MG/1
0.5 TABLET ORAL
Qty: 900 | Refills: 0 | Status: DISCONTINUED | OUTPATIENT
Start: 2020-12-20 | End: 2020-12-20

## 2020-12-20 RX ORDER — DILTIAZEM HCL 120 MG
10 CAPSULE, EXT RELEASE 24 HR ORAL ONCE
Refills: 0 | Status: COMPLETED | OUTPATIENT
Start: 2020-12-20 | End: 2020-12-20

## 2020-12-20 RX ORDER — METOPROLOL TARTRATE 50 MG
10 TABLET ORAL ONCE
Refills: 0 | Status: COMPLETED | OUTPATIENT
Start: 2020-12-20 | End: 2020-12-20

## 2020-12-20 RX ORDER — DILTIAZEM HCL 120 MG
10 CAPSULE, EXT RELEASE 24 HR ORAL
Qty: 125 | Refills: 0 | Status: DISCONTINUED | OUTPATIENT
Start: 2020-12-20 | End: 2020-12-21

## 2020-12-20 RX ORDER — PHENYLEPHRINE HYDROCHLORIDE 10 MG/ML
0.5 INJECTION INTRAVENOUS
Qty: 40 | Refills: 0 | Status: DISCONTINUED | OUTPATIENT
Start: 2020-12-20 | End: 2020-12-27

## 2020-12-20 RX ORDER — DEXAMETHASONE 0.5 MG/5ML
6 ELIXIR ORAL DAILY
Refills: 0 | Status: COMPLETED | OUTPATIENT
Start: 2020-12-20 | End: 2020-12-25

## 2020-12-20 RX ORDER — MAGNESIUM SULFATE 500 MG/ML
2 VIAL (ML) INJECTION ONCE
Refills: 0 | Status: COMPLETED | OUTPATIENT
Start: 2020-12-20 | End: 2020-12-20

## 2020-12-20 RX ORDER — SODIUM BICARBONATE 1 MEQ/ML
0.2 SYRINGE (ML) INTRAVENOUS
Qty: 150 | Refills: 0 | Status: DISCONTINUED | OUTPATIENT
Start: 2020-12-20 | End: 2020-12-21

## 2020-12-20 RX ORDER — VANCOMYCIN HCL 1 G
1000 VIAL (EA) INTRAVENOUS ONCE
Refills: 0 | Status: COMPLETED | OUTPATIENT
Start: 2020-12-20 | End: 2020-12-20

## 2020-12-20 RX ORDER — MEROPENEM 1 G/30ML
INJECTION INTRAVENOUS
Refills: 0 | Status: DISCONTINUED | OUTPATIENT
Start: 2020-12-20 | End: 2020-12-20

## 2020-12-20 RX ORDER — ENOXAPARIN SODIUM 100 MG/ML
40 INJECTION SUBCUTANEOUS ONCE
Refills: 0 | Status: DISCONTINUED | OUTPATIENT
Start: 2020-12-20 | End: 2020-12-20

## 2020-12-20 RX ORDER — FUROSEMIDE 40 MG
40 TABLET ORAL ONCE
Refills: 0 | Status: COMPLETED | OUTPATIENT
Start: 2020-12-20 | End: 2020-12-20

## 2020-12-20 RX ORDER — MEROPENEM 1 G/30ML
1000 INJECTION INTRAVENOUS EVERY 12 HOURS
Refills: 0 | Status: DISCONTINUED | OUTPATIENT
Start: 2020-12-21 | End: 2020-12-21

## 2020-12-20 RX ORDER — AMIODARONE HYDROCHLORIDE 400 MG/1
1 TABLET ORAL
Qty: 900 | Refills: 0 | Status: DISCONTINUED | OUTPATIENT
Start: 2020-12-20 | End: 2020-12-20

## 2020-12-20 RX ORDER — ENOXAPARIN SODIUM 100 MG/ML
80 INJECTION SUBCUTANEOUS ONCE
Refills: 0 | Status: COMPLETED | OUTPATIENT
Start: 2020-12-20 | End: 2020-12-20

## 2020-12-20 RX ORDER — AMIODARONE HYDROCHLORIDE 400 MG/1
150 TABLET ORAL ONCE
Refills: 0 | Status: COMPLETED | OUTPATIENT
Start: 2020-12-20 | End: 2020-12-20

## 2020-12-20 RX ORDER — MEROPENEM 1 G/30ML
500 INJECTION INTRAVENOUS ONCE
Refills: 0 | Status: COMPLETED | OUTPATIENT
Start: 2020-12-20 | End: 2020-12-20

## 2020-12-20 RX ORDER — INSULIN LISPRO 100/ML
VIAL (ML) SUBCUTANEOUS EVERY 6 HOURS
Refills: 0 | Status: DISCONTINUED | OUTPATIENT
Start: 2020-12-20 | End: 2020-12-27

## 2020-12-20 RX ORDER — MEROPENEM 1 G/30ML
500 INJECTION INTRAVENOUS EVERY 8 HOURS
Refills: 0 | Status: DISCONTINUED | OUTPATIENT
Start: 2020-12-20 | End: 2020-12-20

## 2020-12-20 RX ADMIN — Medication 10 MG/HR: at 10:15

## 2020-12-20 RX ADMIN — Medication 50 GRAM(S): at 05:17

## 2020-12-20 RX ADMIN — ENOXAPARIN SODIUM 80 MILLIGRAM(S): 100 INJECTION SUBCUTANEOUS at 02:40

## 2020-12-20 RX ADMIN — Medication 10 MILLIGRAM(S): at 02:05

## 2020-12-20 RX ADMIN — Medication 6 MILLIGRAM(S): at 07:09

## 2020-12-20 RX ADMIN — Medication 100 MEQ/KG/HR: at 11:17

## 2020-12-20 RX ADMIN — MEROPENEM 100 MILLIGRAM(S): 1 INJECTION INTRAVENOUS at 16:02

## 2020-12-20 RX ADMIN — AMIODARONE HYDROCHLORIDE 618 MILLIGRAM(S): 400 TABLET ORAL at 03:34

## 2020-12-20 RX ADMIN — Medication 40 MILLIGRAM(S): at 05:17

## 2020-12-20 RX ADMIN — PHENYLEPHRINE HYDROCHLORIDE 14.5 MICROGRAM(S)/KG/MIN: 10 INJECTION INTRAVENOUS at 16:02

## 2020-12-20 RX ADMIN — Medication 10 MILLIGRAM(S): at 10:15

## 2020-12-20 RX ADMIN — AMIODARONE HYDROCHLORIDE 33.3 MG/MIN: 400 TABLET ORAL at 05:41

## 2020-12-20 RX ADMIN — Medication 4: at 07:07

## 2020-12-20 NOTE — PROGRESS NOTE ADULT - ASSESSMENT
2.17.2020 This is an 88 M with dementia comes with COVID PNA. Patient requires help with ADLs. He lives with daughter and wife. His daughter is his HCP. Daughter reports that both her parents have advanced directives that state that they are a DNR/DNI. She would like to complete MOLST. MOLST was reviewed, witnessed and signed. DNR/DNI order entered into EMR.    12.18.2020 Patient still febrile and requiring oxygen. Poor po intake. On remdisivir and decadron.    12.19.2020 Afebrile x 24 hours. Continues to require oxygen.    12.20.2020 Transferred to ICU with acute on chronic hypoxic respiratory failure secondary to COVID PNA. Patient currently on BIPAP. Overall prognosis poor. Daughter aware. Patient remains a DNR/DNI.

## 2020-12-20 NOTE — RAPID RESPONSE TEAM SUMMARY - [id number]_GEN_ALL_CORE
VS: Temp: BP: HR: O2 sat: RR: FS:     GENERAL: no acute distress, appropriate, pleasant  EYES: sclera clear, no exudates  ENMT: oropharynx clear without erythema, no exudates, moist mucous membranes  NECK: supple, soft, no thyromegaly noted  LUNGS: good air entry bilaterally, clear to auscultation, symmetric breath sounds, no wheezing or rhonchi appreciated  HEART: soft S1/S2, regular rate and rhythm, no murmurs noted, no lower extremity edema  GASTROINTESTINAL: abdomen is soft, nontender, nondistended, normoactive bowel sounds, no palpable masses  INTEGUMENT: good skin turgor, no lesions noted  MUSCULOSKELETAL: no clubbing or cyanosis, no obvious deformity  NEUROLOGIC: awake, alert, oriented x3, good muscle tone in 4 extremities, no obvious sensory deficits  PSYCHIATRIC: mood is good, affect is congruent, linear and logical thought process  HEME/LYMPH: no palpable supraclavicular nodules, no obvious ecchymosis or petechiae  VS: Temp: 97.3F BP: 116/83 HR: range with max in 150's O2 sat: 85% on Venturi mask RR: 40 FS: 203    GENERAL: pt lethargic, arousable to verbal stimuli   EYES: sclera clear, no exudates  ENMT: oropharynx clear without erythema, no exudates  LUNGS: rhonchorous breath sounds b/l   HEART: unable to appreciate due to coarse breath sounds  GASTROINTESTINAL: abdomen is soft, nontender, nondistended, normoactive bowel sounds, no palpable masses  MUSCULOSKELETAL: no clubbing or cyanosis, no obvious deformity  NEUROLOGIC: awake, arousable and responsive to verbal stimuli

## 2020-12-20 NOTE — PROGRESS NOTE ADULT - ASSESSMENT
89 yo M with HTN DM HLD dementia (walks with cane) who p/w gen weakness x past several days. Patient has not been eating or drinking for the past couple of days. He started having fevers and chills today 101.4. Patient's wife returned home from [facility] on 11/30. The daughter, who lives in the same house, works in a school. They all started to get sick after after wife got home on 11/30.   Pt was recently diagnosed with COVID as mult members in family have the same. Pt sent to hospital as famly can no longer care for this patient at home - pt was seen in ed yesterday for fall -- no acute findings -- and was sent home. Pt may have slid off chair today - no inj. Pt denies complaints. No abd pain. No acute dyspnea. No other acute co.  (14 Dec 2020 16:50)

## 2020-12-20 NOTE — CONSULT NOTE ADULT - SUBJECTIVE AND OBJECTIVE BOX
Patient is a 88y old  Male who presents with a chief complaint of weakness, covid (19 Dec 2020 22:39)      BRIEF HOSPITAL COURSE: 88y Male  ***    Events last 24 hours: ***    PAST MEDICAL & SURGICAL HISTORY:  Gout    Hyperlipidemia    Hypertension          Hosp day #6d      Vital signs / Reviewed and Physical Exam Performed where pertinent and urgently required    Lab / Radiology  studies / ABG / Meds -  reviewed and interpreted into the assessment and treatment plan.      Impression:  1. acute hypoxemic respiratory failure  2. COVID-19 Viral PNA  3. ARDS  4. AF with RVR  5. diabetes/hyperglycemia      Neuro - Sedation neuromuscular blockade to facilitate safe ventilation    CV -  Pressor support as needed to maintain MAP 65           Avoiding fluid challenges          QTC monitoring while on Azithromycin and Hydroxychloroquine.    Pulm -  ARDS-NET 4-6cc/kg IBW TV as able to maintain plateau pressures <30               Prone ventilation consideration as feasible  Pa02/Fi02 < 150 on Fi02 >60% and PEEP at least 5                 Vent bundle Reviewed     GI -  PPI  Enteric feeds as tolerated in tandem with NMB and prone ventilation    Renal - Even to negative fluid balance as tolerated by hemodynamics and renal fx.  Feeds to be provided in lieu of IVF.     Heme -  Pharmacologic DVT PPx  in addition to SCD's    ID - ABX discontinuation based on discussion with ID in conjunction with clinical features, culture data, and judicious procalcitonin monitoring.      Endo -  Aggressive glycemic control to limit FS glucose to < 180mg/dl.      COVID 19 specific considerations and therapeutic  options based on the available and rapidly changing literature    Goals of care considerations:  Ongoing assessment for patient specific treatment options based on progression or decline.  I have involved the family with updates and requests in guidance for medical decision making.          38  Minutes of critical care tiem spent in the management of this critically ill COVID-19 patient/PUI patient with continuous assessments and interventions based on the interpretation of multiple databases.   Patient is a 88y old  Male who presents with a chief complaint of weakness, covid (19 Dec 2020 22:39)      BRIEF HOSPITAL COURSE:   88M with PMHx HTN, DM, HLD, dementia who presented with weakness and fever. Recently dx'd with COVID.  Admitted to medical service with COVID PNA. Receiving steroids and Remdesivir.     Events last 24 hours: In last 24hours with progression of his hypoxemia with escalating O2 requirements. Developed Afib with RVR, given IV lopressor followed with amio bolus. Subsequent hypotension and ongoing tachycardia. RRT called. Upon arrival pt with sats of 85% on 50% venti with tachypnea. -160, IV lasix x 1 given.    PAST MEDICAL & SURGICAL HISTORY:  Gout    Hyperlipidemia    Hypertension          Hosp day #6d      Vital signs / Reviewed and Physical Exam Performed where pertinent and urgently required    Lab / Radiology  studies / ABG / Meds -  reviewed and interpreted into the assessment and treatment plan.    I&O's Summary    19 Dec 2020 07:01  -  20 Dec 2020 05:35  --------------------------------------------------------  IN: 103 mL / OUT: 200 mL / NET: -97 mL        Impression:  1. acute hypoxemic respiratory failure  2. COVID-19 Viral PNA  3. ARDS  4. AF with RVR  5. diabetes/hyperglycemia  6. acute diastolic heart failure      Neuro - avoiding all neurosuppressants    CV - start IV amio infusion for rate control          will d/c BB given soft BP          keep K~4 and Mg>2 for optimal arrhythmia suppression          check TSH          denies CP          will hold on Echo given his overall prognosis is likely poor          full AC with tx dose lovenox    Pulm -  start HFNC with flow at 40liters for PEEP effect             change steroids to IV             cont Remdesivir              pt remains DNI, will escalate to NIPPV, hoping that some component of his acute hypoxia is acute dHF             IV lasix             check LE duplex    GI -  PPI, NPO for now    Renal - Cr baseline 1.2-1.4, last labs 1.7, avoid MAP<65, renally adjust all meds, avoid nephrotoxins, strict I/Os, goal even to negative fluid balance, trend Cr      Heme -  Full AC with lovenox tx dose in addition to SCDs     ID - afebrile, WBC remains normal, BCx NGTD, Abx addition based on discussion with ID in conjunction with clinical features, culture data.     Endo -  Aggressive glycemic control to limit FS glucose to < 180mg/dl, change to ISS coverage [id number] with mod dosing, if still remains elevated will start lantus, check TSH.      COVID 19 specific considerations and therapeutic  options based on the available and rapidly changing literature    MOLST in place and remains DNR/DNI     40  Minutes of critical care time spent in the management of this critically ill COVID-19 patient with continuous assessments and interventions based on the interpretation of multiple databases.   Patient is a 88y old  Male who presents with a chief complaint of weakness, covid (19 Dec 2020 22:39)      BRIEF HOSPITAL COURSE:   88M with PMHx HTN, DM, HLD, dementia who presented with weakness and fever. Recently dx'd with COVID.  Admitted to medical service with COVID PNA. Receiving steroids and Remdesivir.     Events last 24 hours: In last 24hours with progression of his hypoxemia with escalating O2 requirements. Developed Afib with RVR, given IV lopressor followed with amio bolus. Subsequent hypotension and ongoing tachycardia. RRT called. Upon arrival pt with sats of 85% on 50% venti with tachypnea. -160, IV lasix x 1 given.    PAST MEDICAL & SURGICAL HISTORY:  Gout    Hyperlipidemia    Hypertension          Hosp day #6d      Vital signs / Reviewed and Physical Exam Performed where pertinent and urgently required    Lab / Radiology  studies / ABG / Meds -  reviewed and interpreted into the assessment and treatment plan.    I&O's Summary    19 Dec 2020 07:01  -  20 Dec 2020 05:35  --------------------------------------------------------  IN: 103 mL / OUT: 200 mL / NET: -97 mL        Impression:  1. acute hypoxemic respiratory failure  2. COVID-19 Viral PNA  3. ARDS  4. AF with RVR  5. diabetes/hyperglycemia  6. acute diastolic heart failure      Neuro - avoiding all neurosuppressants    CV - start IV amio infusion for rate control          will d/c BB given soft BP          keep K~4 and Mg>2 for optimal arrhythmia suppression          check TSH          denies CP          will hold on Echo given his overall prognosis is likely poor          full AC with tx dose lovenox    Pulm -  start HFNC with flow at 40liters for PEEP effect             change steroids to IV             cont Remdesivir              pt remains DNI, will escalate to NIPPV, hoping that some component of his acute hypoxia is acute dHF             IV lasix             check LE duplex    GI -  PPI, NPO for now    Renal - Cr baseline 1.2-1.4, last labs 1.7, avoid MAP<65, renally adjust all meds, avoid nephrotoxins, strict I/Os, goal even to negative fluid balance, trend Cr      Heme -  Full AC with lovenox tx dose in addition to SCDs     ID - afebrile, WBC remains normal, BCx NGTD, Abx addition based on discussion with ID in conjunction with clinical features, culture data.     Endo -  Aggressive glycemic control to limit FS glucose to < 180mg/dl, change to ISS coverage [id number] with mod dosing, if still remains elevated will start lantus, check TSH.      COVID 19 specific considerations and therapeutic  options based on the available and rapidly changing literature    MOLST in place and remains DNR/DNI.    Case and plan discussed with eICU attending Dr Dahl.    40  Minutes of critical care time spent in the management of this critically ill COVID-19 patient with continuous assessments and interventions based on the interpretation of multiple databases.

## 2020-12-20 NOTE — CONSULT NOTE ADULT - SUBJECTIVE AND OBJECTIVE BOX
· Subjective and Objective:   [facility] CARDIOLOGY CONSULTANTS:    Alethea Christian, Santana [place], Fabricio Lofton [place], [place]      [phone]    CHIEF COMPLAINT: Patient is a 88y old  Male who presents with a chief complaint of weakness, covid (20 Dec 2020 07:10)    88M with PMHx HTN, DM, HLD, dementia who presented to the hospital with weakness and fever. Recently dx'd with COVID.  Admitted to medical service with COVID PNA. Receiving steroids and Remdesivir.     Events last 24 hours: In last 24hours with progression of his hypoxemia, escalating O2 requirements. Developed Afib with RVR, given IV lopressor followed with amio bolus. Subsequent hypotension and ongoing tachycardia. RRT called and transferred to ICU. Upon arrival to ICU pt with sats of 85% on 50% venti with tachypnea. -160, IV lasix x 1 given.  He is now on amio gtt.  He currently states that his breathing is labored but he is comfortable.  BP has been soft     TELEMETRY: -130's  CXR b/l PNA     ROS otherwise negative unless noted        PAST MEDICAL & SURGICAL HISTORY:  Gout    Hyperlipidemia    Hypertension        MEDICATIONS  (STANDING):  aMIOdarone Infusion 1 mG/Min (33.3 mL/Hr) IV Continuous <Continuous>  aMIOdarone Infusion 0.5 mG/Min (16.7 mL/Hr) IV Continuous <Continuous>  dexAMETHasone  Injectable 6 milliGRAM(s) IV Push daily  dextrose 40% Gel 15 Gram(s) Oral once  dextrose 5%. 1000 milliLiter(s) (50 mL/Hr) IV Continuous <Continuous>  dextrose 5%. 1000 milliLiter(s) (100 mL/Hr) IV Continuous <Continuous>  dextrose 50% Injectable 25 Gram(s) IV Push once  dextrose 50% Injectable 12.5 Gram(s) IV Push once  dextrose 50% Injectable 25 Gram(s) IV Push once  diltiazem Infusion 10 mG/Hr (10 mL/Hr) IV Continuous <Continuous>  diltiazem Injectable 10 milliGRAM(s) IV Push once  enoxaparin Injectable 80 milliGRAM(s) SubCutaneous daily  glucagon  Injectable 1 milliGRAM(s) IntraMuscular once  insulin lispro (ADMELOG) corrective regimen sliding scale   SubCutaneous every 6 hours  remdesivir  IVPB 100 milliGRAM(s) IV Intermittent every 24 hours      Allergies    penicillins (Unknown)  sulfa drugs (Unknown)    Intolerances                              13.9   10.06 )-----------( 184      ( 19 Dec 2020 07:29 )             39.2           x   |  x   |  x   ----------------------------<  x   x    |  x   |  1.70<H>    Ca    8.0<L>      18 Dec 2020 09:59    TPro  5.8<L>  /  Alb  1.9<L>  /  TBili  1.7<H>  /  DBili  1.10<H>  /  AST  42<H>  /  ALT  31  /  AlkPhos  135<H>        LIVER FUNCTIONS - ( 19 Dec 2020 07:29 )  Alb: 1.9 g/dL / Pro: 5.8 g/dL / ALK PHOS: 135 U/L / ALT: 31 U/L / AST: 42 U/L / GGT: x             PT/INR - ( 20 Dec 2020 01:24 )   PT: 12.6 sec;   INR: 1.08 ratio         PTT - ( 20 Dec 2020 01:24 )  PTT:34.2 sec                      Daily     Daily Weight in k (20 Dec 2020 05:25)    I&O's Summary    19 Dec 2020 07:01  -  20 Dec 2020 07:00  --------------------------------------------------------  IN: 169.6 mL / OUT: 200 mL / NET: -30.4 mL        Vital Signs Last 24 Hrs  T(C): 37.1 (20 Dec 2020 03:11), Max: 37.1 (20 Dec 2020 02:35)  T(F): 98.7 (20 Dec 2020 03:11), Max: 98.8 (20 Dec 2020 02:35)  HR: 129 (20 Dec 2020 07:41) (73 - 166)  BP: 127/55 (20 Dec 2020 06:00) (87/51 - 148/77)  BP(mean): 71 (20 Dec 2020 06:00) (62 - 71)  RR: 43 (20 Dec 2020 06:00) (19 - 46)  SpO2: 91% (20 Dec 2020 07:41) (80% - 94%)    PHYSICAL EXAM:   · Constitutional	Well-developed, well nourished  · Eyes	EOMI; PERRL; no drainage or redness  · ENMT	No oral lesions; no gross abnormalities  · Neck	No bruits; no thyromegaly or nodules  · Respiratory	Normal breath sounds b/l, No RRW  · Cardiovascular	Regular rate & rhythm, normal S1, S2; no murmurs, gallops or rubs; no S3, S4  · Gastrointestinal	Soft, non-tender, no hepatosplenomegaly, normal bowel sounds  · Extremities	No cyanosis, clubbing or edema  · Vascular	Equal and normal pulses (carotid, femoral, dorsalis pedis)  · Neurological	Alert & oriented; no sensory, motor or coordination deficits, normal reflexes

## 2020-12-20 NOTE — CHART NOTE - NSCHARTNOTEFT_GEN_A_CORE
I have called and spoken to pts daughter David Pryor on phone this morning. I have updated her on the current events. We discussed her current MOLST and he remains DNR/DNI. I further discussed that he has a rapid decline and that we are attempting all noninvasive high level vent methods in an attempt to rescue him, however he may continue to decline and that movement toward comfort be warranted in the event he does not respond. She expressed that she understood. Will signout to [facility] for repeat conversations as status progresses.

## 2020-12-20 NOTE — PROVIDER CONTACT NOTE (CHANGE IN STATUS NOTIFICATION) - ACTION/TREATMENT ORDERED:
STAT EKG ordered and done,Dr. Rodriguez made aware and is going to come see STAT EKG ordered and done,Dr. Rodriguez made aware and is going to come see  0129- EKG seen by Dr. Rodriguez

## 2020-12-20 NOTE — CONSULT NOTE ADULT - ASSESSMENT
AZALEA on CKD  COVID 19+  Metabolic Acidosis    -BLCR 1.4  -AZALEA 2/2 hypoxemic injury  -CHeck Urin lyivonne  -UA 30 protein mod blood  -Star bicarb gtt  -Place mueller  -Strict &Os  -Patient DNR/DNI    d/w ICU AZALEA on CKD  COVID 19+  Metabolic Acidosis    -BLCR 1.4  -AZALEA 2/2 hypoxemic injury  -CHeck Urin walter  -UA 30 protein mod blood  -Star bicarb gtt  -Place mueller  -Strict &Os  -Patient DNR/DNI  -No acute indication for dialysis, but would be poor candidate       d/w ICU

## 2020-12-20 NOTE — PROGRESS NOTE ADULT - SUBJECTIVE AND OBJECTIVE BOX
Date/Time Patient Seen:  		  Referring MD:   Data Reviewed	       Patient is a 88y old  Male who presents with a chief complaint of weakness, covid (20 Dec 2020 05:18)      Subjective/HPI     PAST MEDICAL & SURGICAL HISTORY:  Gout    Hyperlipidemia    Hypertension          Medication list         MEDICATIONS  (STANDING):  allopurinol 100 milliGRAM(s) Oral daily  aMIOdarone Infusion 1 mG/Min (33.3 mL/Hr) IV Continuous <Continuous>  aMIOdarone Infusion 0.5 mG/Min (16.7 mL/Hr) IV Continuous <Continuous>  atorvastatin 10 milliGRAM(s) Oral at bedtime  dexAMETHasone  Injectable 6 milliGRAM(s) IV Push daily  dextrose 40% Gel 15 Gram(s) Oral once  dextrose 5%. 1000 milliLiter(s) (50 mL/Hr) IV Continuous <Continuous>  dextrose 5%. 1000 milliLiter(s) (100 mL/Hr) IV Continuous <Continuous>  dextrose 50% Injectable 25 Gram(s) IV Push once  dextrose 50% Injectable 12.5 Gram(s) IV Push once  dextrose 50% Injectable 25 Gram(s) IV Push once  enoxaparin Injectable 80 milliGRAM(s) SubCutaneous daily  glucagon  Injectable 1 milliGRAM(s) IntraMuscular once  insulin lispro (ADMELOG) corrective regimen sliding scale   SubCutaneous every 6 hours  memantine 5 milliGRAM(s) Oral daily  potassium chloride    Tablet ER 10 milliEquivalent(s) Oral daily  remdesivir  IVPB 100 milliGRAM(s) IV Intermittent every 24 hours    MEDICATIONS  (PRN):  acetaminophen   Tablet .. 650 milliGRAM(s) Oral every 6 hours PRN Temp greater or equal to 38C (100.4F), Mild Pain (1 - 3)  ondansetron Injectable 4 milliGRAM(s) IV Push every 6 hours PRN Nausea and/or Vomiting         Vitals log        ICU Vital Signs Last 24 Hrs  T(C): 37.1 (20 Dec 2020 03:11), Max: 37.1 (20 Dec 2020 02:35)  T(F): 98.7 (20 Dec 2020 03:11), Max: 98.8 (20 Dec 2020 02:35)  HR: 130 (20 Dec 2020 06:33) (65 - 166)  BP: 127/55 (20 Dec 2020 06:00) (87/51 - 148/77)  BP(mean): 71 (20 Dec 2020 06:00) (62 - 71)  ABP: --  ABP(mean): --  RR: 43 (20 Dec 2020 06:00) (19 - 46)  SpO2: 93% (20 Dec 2020 06:33) (80% - 94%)           Input and Output:  I&O's Detail    19 Dec 2020 07:01  -  20 Dec 2020 07:00  --------------------------------------------------------  IN:    Amiodarone: 66.6 mL    IV PiggyBack: 103 mL  Total IN: 169.6 mL    OUT:    Incontinent per Condom Catheter (mL): 200 mL  Total OUT: 200 mL    Total NET: -30.4 mL          Lab Data                        13.9   10.06 )-----------( 184      ( 19 Dec 2020 07:29 )             39.2     12-19    x   |  x   |  x   ----------------------------<  x   x    |  x   |  1.70<H>    Ca    8.0<L>      18 Dec 2020 09:59  Mg     1.9     12-18    TPro  5.8<L>  /  Alb  1.9<L>  /  TBili  1.7<H>  /  DBili  1.10<H>  /  AST  42<H>  /  ALT  31  /  AlkPhos  135<H>  12-19            Review of Systems	      Objective     Physical Examination    heart s1s2  lung dec BS  abd soft      Pertinent Lab findings & Imaging      Tariq:  NO   Adequate UO     I&O's Detail    19 Dec 2020 07:01  -  20 Dec 2020 07:00  --------------------------------------------------------  IN:    Amiodarone: 66.6 mL    IV PiggyBack: 103 mL  Total IN: 169.6 mL    OUT:    Incontinent per Condom Catheter (mL): 200 mL  Total OUT: 200 mL    Total NET: -30.4 mL               Discussed with:     Cultures:	        Radiology

## 2020-12-20 NOTE — PROGRESS NOTE ADULT - SUBJECTIVE AND OBJECTIVE BOX
[facility] DIVISION of INFECTIOUS DISEASE  Denver Lew MD PhD, Maxine Rodriguez MD, Aanhy Syed MD, Jelena Regalado MD  and providing coverage with Katherine Martinez MD and Mala Bray MD  Providing Infectious Disease Consultations at [facility], [facility], [facility]'s      Office# [phone] to schedule follow up appointments  Answering Service for urgent calls or New Consults [phone]    Infectious Diseases Progress Note:    SPENCER LEVY is a 88y year old Male patient    COVID-19 Patient    Allergies: penicillins (Unknown)  sulfa drugs (Unknown)    ANTIBIOTICS/RELEVANT:  Antimicrobials  meropenem  IVPB      meropenem  IVPB 500 milliGRAM(s) IV Intermittent once  meropenem  IVPB 500 milliGRAM(s) IV Intermittent every 8 hours    Immunologic:  Other Meds:  dexAMETHasone  Injectable 6 milliGRAM(s) IV Push daily  dextrose 40% Gel 15 Gram(s) Oral once  dextrose 5%. 1000 milliLiter(s) IV Continuous <Continuous>  dextrose 5%. 1000 milliLiter(s) IV Continuous <Continuous>  dextrose 50% Injectable 25 Gram(s) IV Push once  dextrose 50% Injectable 12.5 Gram(s) IV Push once  dextrose 50% Injectable 25 Gram(s) IV Push once  diltiazem Infusion 10 mG/Hr IV Continuous <Continuous>  glucagon  Injectable 1 milliGRAM(s) IntraMuscular once  insulin lispro (ADMELOG) corrective regimen sliding scale   SubCutaneous every 6 hours  ondansetron Injectable 4 milliGRAM(s) IV Push every 6 hours PRN  sodium bicarbonate  Infusion 0.195 mEq/kG/Hr IV Continuous <Continuous>    Objective:  Vital Signs Last 24 Hrs  T(F): 97.5 (20 Dec 2020 08:45), Max: 98.8 (20 Dec 2020 02:35)  HR: 58 (20 Dec 2020 12:55) (55 - 166)  BP: 82/54 (20 Dec 2020 12:45) (56/41 - 153/101)  RR: 42 (20 Dec 2020 12:45) (19 - 46)  SpO2: 97% (20 Dec 2020 12:55) (68% - 98%)  T(C): 36.4 (12-20-20 @ 08:45), Max: 37.1 (12-20-20 @ 02:35)  T(C): 36.4 (12-20-20 @ 08:45), Max: 38.1 (12-18-20 @ 05:05)  T(C): 36.4 (12-20-20 @ 08:45), Max: 39.4 (12-16-20 @ 22:18)    PHYSICAL EXAM:  HEENT: NC/AT, anicteric, supple  Respiratory: dec breath sounds  Cardiovascular: S1 S2 present, normal rate  Gastrointestinal: normal appearing, nondistended  Extremities: no edema, no cyanosis    LABS:                        13.9   10.06 )-----------( 184      ( 19 Dec 2020 07:29 )             39.2     10.06 12-19 @ 07:29  9.34 12-18 @ 09:59  8.33 12-18 @ 07:31  4.87 12-16 @ 10:13  3.31 12-15 @ 06:00  4.14 12-14 @ 12:44    12-20    133<L>  |  100  |  78<H>  ----------------------------<  177<H>  4.9   |  12<L>  |  3.20<H>    Ca    8.1<L>      20 Dec 2020 09:06    TPro  5.6<L>  /  Alb  1.8<L>  /  TBili  1.9<H>  /  DBili  .90<H>  /  AST  218<H>  /  ALT  91<H>  /  AlkPhos  128<H>  12-20    Creatinine, Serum: 3.20 mg/dL (12-20-20 @ 09:06)  Creatinine, Serum: 1.70 mg/dL (12-19-20 @ 07:29)  Creatinine, Serum: 1.40 mg/dL (12-18-20 @ 09:59)  Creatinine, Serum: 1.30 mg/dL (12-16-20 @ 10:13)  Creatinine, Serum: 1.30 mg/dL (12-15-20 @ 06:00)  Creatinine, Serum: 1.40 mg/dL (12-14-20 @ 12:44)    PT/INR - ( 20 Dec 2020 01:24 )   PT: 12.6 sec;   INR: 1.08 ratio      PTT - ( 20 Dec 2020 01:24 )  PTT:34.2 sec    COVID RISK SCORE  Auto Neutrophil #: 8.92 K/uL (12-19-20 @ 07:29)  Auto Neutrophil #: 7.76 K/uL (12-18-20 @ 07:31)  Auto Neutrophil #: 4.13 K/uL (12-16-20 @ 10:13)  Auto Neutrophil #: 2.56 K/uL (12-15-20 @ 06:00)  Auto Neutrophil #: 3.47 K/uL (12-14-20 @ 12:44)    Auto Lymphocyte #: 0.65 K/uL (12-19-20 @ 07:29)  Auto Lymphocyte #: 0.29 K/uL (12-18-20 @ 07:31)  Auto Lymphocyte #: 0.50 K/uL (12-16-20 @ 10:13)  Auto Lymphocyte #: 0.57 K/uL (12-15-20 @ 06:00)  Auto Lymphocyte #: 0.46 K/uL (12-14-20 @ 12:44)    Procalcitonin, Serum: 1.54 ng/mL (12-20-20 @ 01:24)  Procalcitonin, Serum: 0.49 ng/mL (12-18-20 @ 07:31)  Procalcitonin, Serum: 0.13 ng/mL (12-15-20 @ 06:00)  Procalcitonin, Serum: 0.13 ng/mL (12-14-20 @ 12:44)    Ferritin, Serum: 1748 ng/mL (12-18-20 @ 11:03)  Ferritin, Serum: 904 ng/mL (12-16-20 @ 15:28)  Ferritin, Serum: 664 ng/mL (12-15-20 @ 09:17)  Ferritin, Serum: 640 ng/mL (12-14-20 @ 17:41)    D-Dimer Assay, Quantitative: 1198 ng/mL DDU (12-18-20 @ 07:31)  D-Dimer Assay, Quantitative: 1774 ng/mL DDU (12-16-20 @ 10:13)  D-Dimer Assay, Quantitative: 931 ng/mL DDU (12-15-20 @ 06:00)  D-Dimer Assay, Quantitative: 3136 ng/mL DDU (12-14-20 @ 12:44)    Sedimentation Rate, Erythrocyte: 22 mm/hr (12-20-20 @ 01:24)  Sedimentation Rate, Erythrocyte: 25 mm/hr (12-15-20 @ 06:00)    Creatine Kinase, Serum: 298 U/L (12-20-20 @ 09:06)  Creatine Kinase, Serum: 253 U/L (12-15-20 @ 06:00)  Creatine Kinase, Serum: 273 U/L (12-14-20 @ 12:44)    Troponin I, Serum: .015 ng/mL (12-14-20 @ 12:44)    Lactate, Blood: 10.7 mmol/L (12-20-20 @ 11:02)  Lactate, Blood: 1.1 mmol/L (12-15-20 @ 06:00)  Lactate, Blood: 2.2 mmol/L (12-14-20 @ 12:44)    Prothrombin Time, Plasma: 12.6 sec (12-20-20 @ 01:24)  Prothrombin Time, Plasma: 12.4 sec (12-14-20 @ 12:44)    Activated Partial Thromboplastin Time: 34.2 sec (12-20-20 @ 01:24)  Activated Partial Thromboplastin Time: 30.0 sec (12-14-20 @ 12:44)    MICROBIOLOGY: reviewed  Blood Culture Results: No growth to date. (12-17 @ 18:06)  Blood Culture Results: No growth to date. (12-17 @ 18:06)    RADIOLOGY & ADDITIONAL STUDIES: reviewed

## 2020-12-20 NOTE — CONSULT NOTE ADULT - ASSESSMENT
88M with PMHx HTN, DM, HLD, dementia who presented to the hospital with weakness and fever. Recently dx'd with COVID.  Admitted to medical service with COVID PNA. Receiving steroids and Remdesivir.   Now with AF RVR      AF:  - currently on amio gtt  - BP soft but overall improving, give trial of diltiazem gtt to help with HR  - hopefully as HR improves BP will as well  - monitor closely for worsening HF  - currently on full dose lovenox    HF:  - unknown LV function  - lasix x12 given, monitor closely and c/w PRN doses  - monitor renal function and electrolytes  - can check TTE when able to  - stricts I&O's, daily weights    COVID  - BiPAP  - on remdesivir and decadron  - pulm/ICU input appreciated    AZALEA   - Cr baseline 1.2-1.4, last labs 1.7, avoid MAP<65, renally adjust all meds, avoid nephrotoxins,   - strict I/Os, goal even to negative fluid balance,   - trend Cr      - Monitor and replete lytes, keep K>4 and Mg >2  - Further cardiac workup will depend on clinical course.   - All other workup per primary team  - Pt DNR/DNI    Thank you for the consult  Will continue to follow

## 2020-12-20 NOTE — PROVIDER CONTACT NOTE (CHANGE IN STATUS NOTIFICATION) - ACTION/TREATMENT ORDERED:
Dr. Rodriguez made aware of VS and situations. Nursing supervisor and charge RN made aware of MEWS.   Per Dr. Rodriguez, cardiology consulted & Amiodarone IV ordered.

## 2020-12-20 NOTE — DIETITIAN INITIAL EVALUATION ADULT. - ORAL INTAKE PTA/DIET HISTORY
Pt n/a, on isolation with BIPAP in place. Per EMR, family is all COVID+, pt with decreased intake PTA, also with decreased intake since admission(12/14)

## 2020-12-20 NOTE — CONSULT NOTE ADULT - SUBJECTIVE AND OBJECTIVE BOX
Patient is a 88y old  Male who presents with a chief complaint of weakness, covid (20 Dec 2020 13:23)       HPI:  87 yo M with HTN DM HLD dementia (walks with cane) who p/w gen weakness x past several days. Patient has not been eating or drinking for the past couple of days. He started having fevers and chills today 101.4. Patient's wife returned home from [facility] on . The daughter, who lives in the same house, works in a school. They all started to get sick after after wife got home on .   Pt was recently diagnosed with COVID as mult members in family have the same. Pt sent to hospital as famly can no longer care for this patient at home - pt was seen in ed yesterday for fall -- no acute findings -- and was sent home. Pt may have slid off chair today - no inj. Pt denies complaints. No abd pain. No acute dyspnea. No other acute co.  (14 Dec 2020 16:50)       PAST MEDICAL & SURGICAL HISTORY:  Gout    Hyperlipidemia    Hypertension         FAMILY HISTORY:  NC    Social History:Non smoker    MEDICATIONS  (STANDING):  dexAMETHasone  Injectable 6 milliGRAM(s) IV Push daily  dextrose 40% Gel 15 Gram(s) Oral once  dextrose 5%. 1000 milliLiter(s) (50 mL/Hr) IV Continuous <Continuous>  dextrose 5%. 1000 milliLiter(s) (100 mL/Hr) IV Continuous <Continuous>  dextrose 50% Injectable 25 Gram(s) IV Push once  dextrose 50% Injectable 12.5 Gram(s) IV Push once  dextrose 50% Injectable 25 Gram(s) IV Push once  diltiazem Infusion 10 mG/Hr (10 mL/Hr) IV Continuous <Continuous>  glucagon  Injectable 1 milliGRAM(s) IntraMuscular once  insulin lispro (ADMELOG) corrective regimen sliding scale   SubCutaneous every 6 hours  phenylephrine    Infusion 0.5 MICROgram(s)/kG/Min (14.5 mL/Hr) IV Continuous <Continuous>  sodium bicarbonate  Infusion 0.195 mEq/kG/Hr (100 mL/Hr) IV Continuous <Continuous>    MEDICATIONS  (PRN):  ondansetron Injectable 4 milliGRAM(s) IV Push every 6 hours PRN Nausea and/or Vomiting   Meds reviewed    Allergies    penicillins (Unknown)  sulfa drugs (Unknown)    Intolerances         REVIEW OF SYSTEMS:    Limited due to respiratory status    Vital Signs Last 24 Hrs  T(C): 35.7 (20 Dec 2020 18:15), Max: 37.1 (20 Dec 2020 02:35)  T(F): 96.3 (20 Dec 2020 18:15), Max: 98.8 (20 Dec 2020 02:35)  HR: 66 (20 Dec 2020 18:45) (55 - 166)  BP: 88/50 (20 Dec 2020 18:45) (56/41 - 153/101)  BP(mean): 62 (20 Dec 2020 18:45) (34 - 117)  RR: 40 (20 Dec 2020 18:45) (19 - 46)  SpO2: 99% (20 Dec 2020 18:45) (68% - 99%)  Daily     Daily Weight in k (20 Dec 2020 05:25)    PHYSICAL EXAM:    GENERAL: NAD  Deferred due to COVID 19 isolation and reduce transmission      LABS:                        13.7   9.32  )-----------( 248      ( 20 Dec 2020 13:38 )             40.0     12-20    133<L>  |  100  |  78<H>  ----------------------------<  177<H>  4.9   |  12<L>  |  3.20<H>    Ca    8.1<L>      20 Dec 2020 09:06    TPro  5.6<L>  /  Alb  1.8<L>  /  TBili  1.9<H>  /  DBili  .90<H>  /  AST  218<H>  /  ALT  91<H>  /  AlkPhos  128<H>  12-20    PT/INR - ( 20 Dec 2020 01:24 )   PT: 12.6 sec;   INR: 1.08 ratio         PTT - ( 20 Dec 2020 01:24 )  PTT:34.2 sec            RADIOLOGY & ADDITIONAL TESTS:

## 2020-12-20 NOTE — PROGRESS NOTE ADULT - SUBJECTIVE AND OBJECTIVE BOX
Patient is a 88y old  Male who presents with a chief complaint of weakness, covid (18 Dec 2020 10:34)      Subjective: Patient seen and examined at bedside. Events noted. Transferred to ICU    PAIN: N  DYSPNEA: N	  NAUS/VOM: 	N  SECRETIONS: 	N  AGITATION: N    OTHER REVIEW OF SYSTEMS: unobtainable -- on bipap    Vital Signs Last 24 Hrs  T(C): 36.8 (19 Dec 2020 08:00), Max: 36.8 (19 Dec 2020 08:00)  T(F): 98.3 (19 Dec 2020 08:00), Max: 98.3 (19 Dec 2020 08:00)  HR: 65 (19 Dec 2020 08:00) (65 - 97)  BP: 104/73 (19 Dec 2020 08:00) (103/65 - 104/73)  BP(mean): --  RR: 20 (19 Dec 2020 08:00) (19 - 20)  SpO2: 90% (19 Dec 2020 08:00) (90% - 91%)    12-19    x   |  x   |  x   ----------------------------<  x   x    |  x   |  1.70<H>    Ca    8.0<L>      18 Dec 2020 09:59  Mg     1.9     12-18    TPro  5.8<L>  /  Alb  1.9<L>  /  TBili  1.7<H>  /  DBili  1.10<H>  /  AST  42<H>  /  ALT  31  /  AlkPhos  135<H>  12-19                          13.9   10.06 )-----------( 184      ( 19 Dec 2020 07:29 )             39.2       CAPILLARY BLOOD GLUCOSE      POCT Blood Glucose.: 221 mg/dL (19 Dec 2020 12:17)  POCT Blood Glucose.: 164 mg/dL (18 Dec 2020 21:38)  POCT Blood Glucose.: 182 mg/dL (18 Dec 2020 16:48)              acetaminophen   Tablet .. 650 milliGRAM(s) Oral every 6 hours PRN  allopurinol 100 milliGRAM(s) Oral daily  ATENolol  Tablet 25 milliGRAM(s) Oral daily  atorvastatin 10 milliGRAM(s) Oral at bedtime  dexAMETHasone     Tablet 6 milliGRAM(s) Oral daily  dextrose 40% Gel 15 Gram(s) Oral once  dextrose 5%. 1000 milliLiter(s) IV Continuous <Continuous>  dextrose 5%. 1000 milliLiter(s) IV Continuous <Continuous>  dextrose 50% Injectable 25 Gram(s) IV Push once  dextrose 50% Injectable 12.5 Gram(s) IV Push once  dextrose 50% Injectable 25 Gram(s) IV Push once  dronabinol 2.5 milliGRAM(s) Oral two times a day  enoxaparin Injectable 40 milliGRAM(s) SubCutaneous daily  glucagon  Injectable 1 milliGRAM(s) IntraMuscular once  insulin lispro (ADMELOG) corrective regimen sliding scale   SubCutaneous three times a day before meals  insulin lispro (ADMELOG) corrective regimen sliding scale   SubCutaneous at bedtime  memantine 5 milliGRAM(s) Oral daily  ondansetron Injectable 4 milliGRAM(s) IV Push every 6 hours PRN  potassium chloride    Tablet ER 10 milliEquivalent(s) Oral daily  remdesivir  IVPB 100 milliGRAM(s) IV Intermittent every 24 hours      GENERAL:  on bipap  HEENT:  NC/AT EOMI PERRL  NECK: supple no JVD  CVS:  +S1 S2 RRR  RESP: decreased bs  GI:  soft NT/ND +BS  : no suprapubic tenderness  MUSC:  no lower extremity edema  SKIN:  Warm, moist, no rashes   LYMPH: normal     MEDS REVIEWED	            ADVANCED DIRECTIVES:         DNR     DNI    MOLST    PSYCHOSOCIAL-SPIRITUAL ASSESSMENT:    ___Reviewed     ___Care  plan unchanged     ___Care plan adjusted as below    GOALS OF CARE DISCUSSION  	___Palliative care info/counseling provided	    ___Family meeting  	___Advanced Directives addressed	    ___See previous Palliative Medicine Note    AGENCY CHOICE DISCUSSED:   ___HOSPICE   ___[facility]  ___OTHER:              > 50% OF THE TIME SPENT IN COUNSELING AND COORDINATING CARE 	    Minutes:      PROLONGED SERVICE             FACE TO FACE:    PT            PT & FAMILY	       Minutes:      Advance Care Planning Time:

## 2020-12-20 NOTE — CONSULT NOTE ADULT - REASON FOR ADMISSION
weakness, covid

## 2020-12-20 NOTE — DIETITIAN INITIAL EVALUATION ADULT. - OTHER INFO
Pt admit with COVID infection, now in ICU on BIPAP. Was on marinol, d/c with NPO. Sacral DTI noted. No BM noted since admission. MOLST noted, DNR, DNI, no tube feeds. Per MD, possible comfort care if deteriorates further. Cr now 3.2. RN was encouraging po intake on floor, but pt was refusing. BMI 25.

## 2020-12-20 NOTE — PROGRESS NOTE ADULT - ASSESSMENT
Patient is a 89 yo M with HTN DM HLD dementia (walks with cane) who p/w gen weakness x past several days. fever, recently diagnosed with COVID sent to hospital as family can no longer care for this patient at home - 12/14 first COVID+ PCR    RECOMMENDATIONS  12/14 NLR 3.47/0.46=7.5 97% on RA, would NOT start steroid as pt sats fine, rec LMWH prophylactic dose, with no hypoxemia and unclear duration rec NO remdesivir  blood with what appears to be a contaminant, urine with <100k enterococcus  12/15 agree with stopping steroids, continue LMWH   12/16 NLR 4/0.5=8 blood cultures with what appear to be contaminant no further Rx, on RA, continued fevers  12/17 now on NC-3.5L some concerns for secondary process will send off further testing, STEROIDs started  12/18 NC-3L, meeting criteria so REMDESIVIR started  12/19 NLR 8.92/0.65=13.7, NC 4L, continue remdesivir, steroids, lovenox, monitor CBC with diff, inflammatory markers  12/20 noted to be hypothermic today with rapid afiba and worsening CXR, transferred to ICU, placed on BIPAP. Lactic acid ~10, LFTs now elevated with AST >5x ULN and Cr 3.2 with GFR <30 - stopped remdesivir. Send for inflammatory markers and CBC with diff - will follow to evaluate for possible Tocilizumab. Send blood cultures. Start empirically on Meropenem.

## 2020-12-20 NOTE — PROGRESS NOTE ADULT - ASSESSMENT
88M with PMHx HTN, DM, HLD, dementia who presented with weakness and fever. Recently dx'd with COVID.  Admitted to medical service with COVID PNA. Receiving steroids and Remdesivir.     Impression:  1. acute hypoxemic respiratory failure  2. COVID-19 Viral PNA  3. ARDS  4. AF with RVR  5. diabetes/hyperglycemia  6. acute diastolic heart failure      #Neuro   - avoiding all neurosuppressants    #CV   - d/c amio infusion given bradycardia  - start cardizem infusion  -d/c BB given soft BP  -keep K~4 and Mg>2 for optimal arrhythmia suppression  -TSH wnl  -will hold on Echo given his overall prognosis is likely poor  -will hold lovenox in setting of worsening renal function    Pulm  -saturating at 95-97% on bipap  -CXR neg for pneumo  -change steroids to IV  -d/c Remdesivir in setting of britt  -pt remains DNI,               GI   -PPI, NPO for now    Renal   -Cr baseline 1.2-1.4, worsening renal indices, Cr 3.2 today  -lactate 10.7, bicarb 12, start sodium bicarb infusion  -avoid MAP<65, avoid nephrotoxins, strict I/Os, goal even to negative fluid balance, trend Cr    -mueller     Heme   -hold lovenox in setting of britt, continue SCDs     ID   -afebrile, WBC remains normal, BCx NGTD, monitor off abx    Endo   -Aggressive glycemic control to limit FS glucose to < 180mg/dl, change to ISS coverage [id number] with mod dosing, if still remains elevated will start lantus,  -TSH.      COVID 19 specific considerations and therapeutic options based on the available and rapidly changing literature    MOLST in place and remains DNR/DNI.

## 2020-12-20 NOTE — CONSULT NOTE ADULT - CONSULT REQUESTED DATE/TIME
20-Dec-2020 20:04
14-Dec-2020 17:06
15-Dec-2020 09:14
17-Dec-2020 14:10
20-Dec-2020 09:53
20-Dec-2020 04:45
20-Dec-2020

## 2020-12-20 NOTE — PROVIDER CONTACT NOTE (CHANGE IN STATUS NOTIFICATION) - BACKGROUND
Pt received 1x dose IVP lopressor for HR.
Admitting dx: COVID19
Admitting dx: COVID   Pt currently on regular diet
Admitting dx: COVID19

## 2020-12-20 NOTE — RAPID RESPONSE TEAM SUMMARY - NSSITUATIONBACKGROUNDRRT_GEN_ALL_CORE
89 yo M with HTN DM HLD dementia (walks with cane) who p/w gen weakness x past several days. fever, recently diagnosed with COVID sent to hospital as family can no longer care for this patient at home - 12/14 first COVID+ PCR. Rapid called for hypotension and tachycardia.  89 yo M with HTN DM HLD dementia (walks with cane) who p/w gen weakness x past several days, fever, recently diagnosed with COVID sent to hospital as family can no longer care for this patient at home - 12/14 first COVID+ PCR.   Rapid response called for hypotension and tachycardia.

## 2020-12-20 NOTE — PROGRESS NOTE ADULT - SUBJECTIVE AND OBJECTIVE BOX
Patient is a 88y old  Male who presents with a chief complaint of weakness, covid (20 Dec 2020 12:29)    24 hour events: ***    REVIEW OF SYSTEMS  unable to obtain 2/2 clinical status     T(F): 97.5 (12-20-20 @ 08:45), Max: 98.8 (12-20-20 @ 02:35)  HR: 58 (12-20-20 @ 12:55) (55 - 166)  BP: 82/54 (12-20-20 @ 12:45) (56/41 - 153/101)  RR: 42 (12-20-20 @ 12:45) (19 - 46)  SpO2: 97% (12-20-20 @ 12:55) (68% - 98%)  Wt(kg): --            I&O's Summary    12-19 @ 07:01  -  12-20 @ 07:00  --------------------------------------------------------  IN: 169.6 mL / OUT: 200 mL / NET: -30.4 mL    12-20 @ 07:01  -  12-20 @ 13:01  --------------------------------------------------------  IN: 253.2 mL / OUT: 250 mL / NET: 3.2 mL      PHYSICAL EXAM  General:   CNS:   HEENT:   Resp:   CVS:   Abd:   Ext:   Skin:     MEDICATIONS    diltiazem Infusion IV Continuous    dexAMETHasone  Injectable IV Push  dextrose 40% Gel Oral  dextrose 50% Injectable IV Push  dextrose 50% Injectable IV Push  dextrose 50% Injectable IV Push  glucagon  Injectable IntraMuscular  insulin lispro (ADMELOG) corrective regimen sliding scale SubCutaneous      ondansetron Injectable IV Push PRN            dextrose 5%. IV Continuous  dextrose 5%. IV Continuous  sodium bicarbonate  Infusion IV Continuous                                13.9   10.06 )-----------( 184      ( 19 Dec 2020 07:29 )             39.2       12-20    133<L>  |  100  |  78<H>  ----------------------------<  177<H>  4.9   |  12<L>  |  3.20<H>    Ca    8.1<L>      20 Dec 2020 09:06    TPro  5.6<L>  /  Alb  1.8<L>  /  TBili  1.9<H>  /  DBili  .90<H>  /  AST  218<H>  /  ALT  91<H>  /  AlkPhos  128<H>  12-20    Lactate 10.7           12-20 @ 11:02      CARDIAC MARKERS ( 20 Dec 2020 09:06 )  x     / x     / 298 U/L / x     / x          PT/INR - ( 20 Dec 2020 01:24 )   PT: 12.6 sec;   INR: 1.08 ratio         PTT - ( 20 Dec 2020 01:24 )  PTT:34.2 sec    .Blood Blood-Peripheral   No growth to date. -- 12-17 @ 18:06        Radiology: ***  Bedside lung ultrasound: ***  Bedside ECHO: ***    CENTRAL LINE: Y/N          DATE INSERTED:              REMOVE: Y/N  MADRIGAL: Y/N                        DATE INSERTED:              REMOVE: Y/N  A-LINE: Y/N                       DATE INSERTED:              REMOVE: Y/N    GLOBAL ISSUE/BEST PRACTICE  Analgesia:   Sedation:   CAM-ICU:   HOB elevation: yes  Stress ulcer prophylaxis:   VTE prophylaxis:   Glycemic control:   Nutrition:     CODE STATUS: ***  [facility] discussion: Y       Patient is a 88y old  Male who presents with a chief complaint of weakness, covid (20 Dec 2020 12:29)    24 hour events: Pt seen and evaluated at bedside. Overnight pt developed afib with RVR and was given IV lopressor and amio bolus. RRT was called for subsequent hypotension and persistent tachycardia. HR was in 140-160s and IV lasix x1 was given. Pt was saturating at 85% on 50% venti and was tachypnic. Transferred to ICU. This morning, saturating at 95-97% on BiPap 12/6. Started on cardizem infusion at 10 ml/hr. Pt was also started on amio infusion but was found to be bradycardic in 50s and was discontinued. CXR was negative for pneumothorax. Sodium bicarb infusion was started for worsening renal function and lactate of 10.7.       REVIEW OF SYSTEMS  unable to obtain 2/2 clinical status     T(F): 97.5 (12-20-20 @ 08:45), Max: 98.8 (12-20-20 @ 02:35)  HR: 58 (12-20-20 @ 12:55) (55 - 166)  BP: 82/54 (12-20-20 @ 12:45) (56/41 - 153/101)  RR: 42 (12-20-20 @ 12:45) (19 - 46)  SpO2: 97% (12-20-20 @ 12:55) (68% - 98%)  Wt(kg): --            I&O's Summary    12-19 @ 07:01  -  12-20 @ 07:00  --------------------------------------------------------  IN: 169.6 mL / OUT: 200 mL / NET: -30.4 mL    12-20 @ 07:01  -  12-20 @ 13:01  --------------------------------------------------------  IN: 253.2 mL / OUT: 250 mL / NET: 3.2 mL      PHYSICAL EXAM:  GENERAL: in mild distress, on bipap   LUNGS: clear to auscultation, no intercostal retractions  HEART: S1/S2, regular rate and rhythm, no murmurs noted  GASTROINTESTINAL: abdomen is soft, nontender, nondistended, normoactive bowel sounds, no palpable masses  INTEGUMENT: good skin turgor, warm skin, appears well perfused  MUSCULOSKELETAL: RLE edema, no clubbing or cyanosis  NEUROLOGIC: no obvious sensory deficits      MEDICATIONS    diltiazem Infusion IV Continuous    dexAMETHasone  Injectable IV Push  dextrose 40% Gel Oral  dextrose 50% Injectable IV Push  dextrose 50% Injectable IV Push  dextrose 50% Injectable IV Push  glucagon  Injectable IntraMuscular  insulin lispro (ADMELOG) corrective regimen sliding scale SubCutaneous      ondansetron Injectable IV Push PRN            dextrose 5%. IV Continuous  dextrose 5%. IV Continuous  sodium bicarbonate  Infusion IV Continuous      Labs from 12/20/20 reviewed.              12-20    133<L>  |  100  |  78<H>  ----------------------------<  177<H>  4.9   |  12<L>  |  3.20<H>    Ca    8.1<L>      20 Dec 2020 09:06    TPro  5.6<L>  /  Alb  1.8<L>  /  TBili  1.9<H>  /  DBili  .90<H>  /  AST  218<H>  /  ALT  91<H>  /  AlkPhos  128<H>  12-20    Lactate 10.7           12-20 @ 11:02      CARDIAC MARKERS ( 20 Dec 2020 09:06 )  x     / x     / 298 U/L / x     / x          PT/INR - ( 20 Dec 2020 01:24 )   PT: 12.6 sec;   INR: 1.08 ratio         PTT - ( 20 Dec 2020 01:24 )  PTT:34.2 sec    .Blood Blood-Peripheral   No growth to date. -- 12-17 @ 18:06        Radiology: CXR - Widespread bilateral airspace disease demonstrate interval progression since December 18, 2020. No pneumothorax.  Bedside lung ultrasound: ***  Bedside ECHO: ***    CENTRAL LINE: N           MADRIGAL: Y                          DATE INSERTED: 12/20/20  A-LINE: N                           GLOBAL ISSUE/BEST PRACTICE  Analgesia: N  Sedation: N  CAM-ICU:   HOB elevation: yes  Stress ulcer prophylaxis:   VTE prophylaxis: Y  Glycemic control: Y  Nutrition: NPO    CODE STATUS: DNR/DNI  GOC discussion: JULIA       Patient is a 88y old  Male who presents with a chief complaint of weakness, covid (20 Dec 2020 12:29)    24 hour events: Pt seen and evaluated at bedside. Overnight pt developed afib with RVR and was given IV lopressor and amio bolus. RRT was called for subsequent hypotension and persistent tachycardia. HR was in 140-160s and IV lasix x1 was given. Pt was saturating at 85% on 50% venti and was tachypnic. Transferred to ICU. This morning, saturating at 95-97% on BiPap 12/6. Started on cardizem infusion at 10 ml/hr. Pt was also started on amio infusion     REVIEW OF SYSTEMS  unable to obtain 2/2 clinical status     T(F): 97.5 (12-20-20 @ 08:45), Max: 98.8 (12-20-20 @ 02:35)  HR: 58 (12-20-20 @ 12:55) (55 - 166)  BP: 82/54 (12-20-20 @ 12:45) (56/41 - 153/101)  RR: 42 (12-20-20 @ 12:45) (19 - 46)  SpO2: 97% (12-20-20 @ 12:55) (68% - 98%)  Wt(kg): --            I&O's Summary    12-19 @ 07:01  -  12-20 @ 07:00  --------------------------------------------------------  IN: 169.6 mL / OUT: 200 mL / NET: -30.4 mL    12-20 @ 07:01  -  12-20 @ 13:01  --------------------------------------------------------  IN: 253.2 mL / OUT: 250 mL / NET: 3.2 mL      PHYSICAL EXAM:  GENERAL: in mild distress, on bipap   LUNGS: clear to auscultation, no intercostal retractions  HEART: S1/S2, regular rate and rhythm, no murmurs noted  GASTROINTESTINAL: abdomen is soft, nontender, nondistended, normoactive bowel sounds, no palpable masses  INTEGUMENT: good skin turgor, warm skin, appears well perfused  MUSCULOSKELETAL: RLE edema, no clubbing or cyanosis      MEDICATIONS    diltiazem Infusion IV Continuous    dexAMETHasone  Injectable IV Push  dextrose 40% Gel Oral  dextrose 50% Injectable IV Push  dextrose 50% Injectable IV Push  dextrose 50% Injectable IV Push  glucagon  Injectable IntraMuscular  insulin lispro (ADMELOG) corrective regimen sliding scale SubCutaneous      ondansetron Injectable IV Push PRN            dextrose 5%. IV Continuous  dextrose 5%. IV Continuous  sodium bicarbonate  Infusion IV Continuous      Labs from 12/20/20 reviewed.              12-20    133<L>  |  100  |  78<H>  ----------------------------<  177<H>  4.9   |  12<L>  |  3.20<H>    Ca    8.1<L>      20 Dec 2020 09:06    TPro  5.6<L>  /  Alb  1.8<L>  /  TBili  1.9<H>  /  DBili  .90<H>  /  AST  218<H>  /  ALT  91<H>  /  AlkPhos  128<H>  12-20    Lactate 10.7           12-20 @ 11:02      CARDIAC MARKERS ( 20 Dec 2020 09:06 )  x     / x     / 298 U/L / x     / x          PT/INR - ( 20 Dec 2020 01:24 )   PT: 12.6 sec;   INR: 1.08 ratio         PTT - ( 20 Dec 2020 01:24 )  PTT:34.2 sec    .Blood Blood-Peripheral   No growth to date. -- 12-17 @ 18:06        Radiology: CXR - Widespread bilateral airspace disease demonstrate interval progression since December 18, 2020. No pneumothorax.    Bedside ECHO: poor echo windows, unable to assess    CENTRAL LINE: N           MADRIGAL: Y                          DATE INSERTED: 12/20/20  A-LINE: N                           GLOBAL ISSUE/BEST PRACTICE  Analgesia: N  Sedation: N  HOB elevation: yes  Stress ulcer prophylaxis:   VTE prophylaxis: Y  Glycemic control: Y  Nutrition: NPO    CODE STATUS: DNR/DNI  GOC discussion: Y

## 2020-12-20 NOTE — PROVIDER CONTACT NOTE (OTHER) - ACTION/TREATMENT ORDERED:
500mls of bolus ordered  Repeat bp after the bolus
Dr. Rodriguez made aware. Pt upgraded to tele with continuous pulse ox. Tele tech notified to notify of any O2% below 88%

## 2020-12-20 NOTE — PROVIDER CONTACT NOTE (CHANGE IN STATUS NOTIFICATION) - SITUATION
Pt coughing when administered regular water in upright position
Pt tachycardic, HR fluctuating 110-160's on cardiac monitor.  Pt has resumed attempting to remove venti mask
MEWS 10
Pt desating to 87% on 4L NC

## 2020-12-20 NOTE — PROGRESS NOTE ADULT - SUBJECTIVE AND OBJECTIVE BOX
Patient is a 88y old  Male who presents with a chief complaint of weakness, covid (20 Dec 2020 20:01)    HPI:  89 yo M with HTN DM HLD dementia (walks with cane) who p/w gen weakness x past several days. Patient has not been eating or drinking for the past couple of days. He started having fevers and chills today 101.4. Patient's wife returned home from [facility] on 11/30. The daughter, who lives in the same house, works in a school. They all started to get sick after after wife got home on 11/30.   Pt was recently diagnosed with COVID as mult members in family have the same. Pt sent to hospital as famly can no longer care for this patient at home - pt was seen in ed yesterday for fall -- no acute findings -- and was sent home. Pt may have slid off chair today - no inj. Pt denies complaints. No abd pain. No acute dyspnea. No other acute co.  (14 Dec 2020 16:50)    INTERVAL HPI/OVERNIGHT EVENTS:  Chart reviewed, notes reviewed.   Patient seen and examined.  Being followed by following specialists.     Consultant(s) Notes Reviewed:  [X] Yes    Care Discussed with Consultants/Other Providers: [X] Yes    REVIEW OF SYSTEMS:  CONSTITUTIONAL: No fever, weight loss, or fatigue  EYES: No eye pain, or discharge  ENMT: No sinus or throat pain  NECK: No pain or stiffness  BREASTS: No pain, masses, or nipple discharge  RESPIRATORY: No cough, wheezing, chills or hemoptysis; No shortness of breath  CARDIOVASCULAR: No chest pain, palpitations, dizziness, or leg swelling  GASTROINTESTINAL: No abdominal or epigastric pain. No nausea, vomiting.  GENITOURINARY: No dysuria, frequency, hematuria, or incontinence  NEUROLOGICAL: No loss of strength, numbness, or tremors  SKIN: No itching, burning, rashes, or lesions   LYMPH NODES: No enlarged glands  ENDOCRINE: No polydipsia or polyuria  MUSCULOSKELETAL: No muscle, back, or extremity pain  PSYCHIATRIC: No depression, anxiety, mood swings.  HEME/LYMPH: No easy bruising, or bleeding gums  ALLERGY AND IMMUNOLOGIC: No hives or eczema    Allergies    penicillins (Unknown)  sulfa drugs (Unknown)    Intolerances      Home Medications:  acetaminophen 325 mg oral tablet: 2 tab(s) orally every 6 hours, As needed, Temp greater or equal to 38C (100.4F), Mild Pain (1 - 3) (19 Dec 2020 19:56)  allopurinol 100 mg oral tablet: 1 tab(s) orally once a day (14 Dec 2020 15:22)  atenolol 25 mg oral tablet: 1 tab(s) orally once a day (14 Dec 2020 15:22)  atorvastatin 10 mg oral tablet: 1 tab(s) orally once a day (14 Dec 2020 15:22)  dexamethasone 6 mg oral tablet: 1 tab(s) orally once a day (19 Dec 2020 19:56)  dronabinol 2.5 mg oral capsule: 1 cap(s) orally 2 times a day (19 Dec 2020 19:56)  enoxaparin 40 mg/0.4 mL injectable solution:  injectable once a day (19 Dec 2020 19:56)  memantine 5 mg oral tablet: 1 tab(s) orally once a day (14 Dec 2020 15:22)  metFORMIN 500 mg oral tablet: 1 tab(s) orally 2 times a day (14 Dec 2020 15:22)    MEDICATIONS  (STANDING):  dexAMETHasone  Injectable 6 milliGRAM(s) IV Push daily  dextrose 40% Gel 15 Gram(s) Oral once  dextrose 5%. 1000 milliLiter(s) (50 mL/Hr) IV Continuous <Continuous>  dextrose 5%. 1000 milliLiter(s) (100 mL/Hr) IV Continuous <Continuous>  dextrose 50% Injectable 25 Gram(s) IV Push once  dextrose 50% Injectable 12.5 Gram(s) IV Push once  dextrose 50% Injectable 25 Gram(s) IV Push once  diltiazem Infusion 10 mG/Hr (10 mL/Hr) IV Continuous <Continuous>  glucagon  Injectable 1 milliGRAM(s) IntraMuscular once  insulin lispro (ADMELOG) corrective regimen sliding scale   SubCutaneous every 6 hours  meropenem  IVPB 1000 milliGRAM(s) IV Intermittent every 12 hours  phenylephrine    Infusion 0.5 MICROgram(s)/kG/Min (14.5 mL/Hr) IV Continuous <Continuous>  sodium bicarbonate  Infusion 0.195 mEq/kG/Hr (100 mL/Hr) IV Continuous <Continuous>  vancomycin  IVPB 1000 milliGRAM(s) IV Intermittent once    MEDICATIONS  (PRN):  ondansetron Injectable 4 milliGRAM(s) IV Push every 6 hours PRN Nausea and/or Vomiting    Vital Signs Last 24 Hrs  T(C): 36.4 (20 Dec 2020 21:00), Max: 37.1 (20 Dec 2020 02:35)  T(F): 97.5 (20 Dec 2020 21:00), Max: 98.8 (20 Dec 2020 02:35)  HR: 93 (20 Dec 2020 23:17) (55 - 166)  BP: 92/63 (20 Dec 2020 23:00) (56/41 - 153/101)  BP(mean): 72 (20 Dec 2020 23:00) (34 - 117)  RR: 44 (20 Dec 2020 23:00) (22 - 46)  SpO2: 98% (20 Dec 2020 23:17) (68% - 99%)    PHYSICAL EXAM:  GENERAL: NAD, well-groomed, well-developed  HEAD:  Atraumatic, Normocephalic  EYES: EOMI, PERRLA, conjunctiva and sclera clear  ENMT: Moist mucous membranes, no lesions  NECK: Supple.  CHEST/LUNG: Clear to auscultation bilaterally; No rales, rhonchi, wheezing, or rubs  HEART: S1, S2.   ABDOMEN: Soft, Nontender, Nondistended; Bowel sounds present  EXTREMITIES:  2+ Peripheral Pulses, No clubbing, cyanosis, or edema  MS: No joint swelling or deformity.   LYMPH: No lymphadenopathy noted  SKIN: No rashes or lesions  NERVOUS SYSTEM:  No focal deficit.   PSYCH:  Awake and alert.   LABS:                         13.7   9.32  )-----------( 248      ( 20 Dec 2020 13:38 )             40.0     20 Dec 2020 09:06    133    |  100    |  78     ----------------------------<  177    4.9     |  12     |  3.20     Ca    8.1        20 Dec 2020 09:06    TPro  5.6    /  Alb  1.8    /  TBili  1.9    /  DBili  .90    /  AST  218    /  ALT  91     /  AlkPhos  128    20 Dec 2020 09:37    CAPILLARY BLOOD GLUCOSE      POCT Blood Glucose.: 131 mg/dL (20 Dec 2020 18:21)  POCT Blood Glucose.: 138 mg/dL (20 Dec 2020 12:12)  POCT Blood Glucose.: 227 mg/dL (20 Dec 2020 05:03)  POCT Blood Glucose.: 203 mg/dL (20 Dec 2020 04:33)    PT/INR - ( 20 Dec 2020 01:24 )   PT: 12.6 sec;   INR: 1.08 ratio         PTT - ( 20 Dec 2020 01:24 )  PTT:34.2 sec      Ferritin, Serum: 61753 ng/mL (12-20 @ 22:45)  Ferritin, Serum: 1748 ng/mL (12-18 @ 11:03)  Ferritin, Serum: 904 ng/mL (12-16 @ 15:28)  Ferritin, Serum: 664 ng/mL (12-15 @ 09:17)  Ferritin, Serum: 640 ng/mL (12-14 @ 17:41)    Thyroid Stimulating Hormone, Serum: 0.86 uIU/mL (12-20 @ 09:06)    Creatine Kinase, Serum: 298 U/L (12-20 @ 09:06)      Culture Results:   No growth to date. (12-17 @ 18:06)  Culture Results:   No growth to date. (12-17 @ 18:06)    Procalcitonin, Serum: 14.77 ng/mL (12-20-20 @ 16:43)  Procalcitonin, Serum: 1.54 ng/mL (12-20-20 @ 01:24)  Procalcitonin, Serum: 0.49 ng/mL (12-18-20 @ 07:31)          RADIOLOGY TEST: (IMAGES REVIEWED BY ME)    Imaging Personally Reviewed:  [X] YES      HEALTH ISSUES - PROBLEM Dx:  Hypokalemia  Hypokalemia    Hypotension  Hypotension    Pneumonia due to COVID-19 virus  Pneumonia due to COVID-19 virus    Gout  Gout    Hyperlipidemia, unspecified hyperlipidemia type  Hyperlipidemia, unspecified hyperlipidemia type    Type 2 diabetes mellitus with other specified complication, without long-term current use of insulin  Type 2 diabetes mellitus with other specified complication, without long-term current use of insulin    Essential hypertension  Essential hypertension    COVID-19  COVID-19         Patient is a 88y old  Male who presents with a chief complaint of weakness, covid (20 Dec 2020 20:01)    HPI:  87 yo M with HTN DM HLD dementia (walks with cane) who p/w gen weakness x past several days. Patient has not been eating or drinking for the past couple of days. He started having fevers and chills today 101.4. Patient's wife returned home from [facility] on 11/30. The daughter, who lives in the same house, works in a school. They all started to get sick after after wife got home on 11/30.   Pt was recently diagnosed with COVID as mult members in family have the same. Pt sent to hospital as famly can no longer care for this patient at home - pt was seen in ed yesterday for fall -- no acute findings -- and was sent home. Pt may have slid off chair today - no inj. Pt denies complaints. No abd pain. No acute dyspnea. No other acute co.  (14 Dec 2020 16:50)    INTERVAL HPI/OVERNIGHT EVENTS:  Chart reviewed, notes reviewed.   Patient seen and examined.  Being followed by following specialists: Pulmonary, ID, cardiology, nephrology,     Consultant(s) Notes Reviewed:  [X] Yes    Care Discussed with Consultants/Other Providers: [X] Yes    12/20/2020--> Events noted. Patient went in to atrial fibrillation with RVR. Rapid response was called and patient moved to ICU. Currently in ICU. Currently on BiPAP    REVIEW OF SYSTEMS:  Unable to obtain.     Allergies    penicillins (Unknown)  sulfa drugs (Unknown)    Intolerances      Home Medications:  acetaminophen 325 mg oral tablet: 2 tab(s) orally every 6 hours, As needed, Temp greater or equal to 38C (100.4F), Mild Pain (1 - 3) (19 Dec 2020 19:56)  allopurinol 100 mg oral tablet: 1 tab(s) orally once a day (14 Dec 2020 15:22)  atenolol 25 mg oral tablet: 1 tab(s) orally once a day (14 Dec 2020 15:22)  atorvastatin 10 mg oral tablet: 1 tab(s) orally once a day (14 Dec 2020 15:22)  dexamethasone 6 mg oral tablet: 1 tab(s) orally once a day (19 Dec 2020 19:56)  dronabinol 2.5 mg oral capsule: 1 cap(s) orally 2 times a day (19 Dec 2020 19:56)  enoxaparin 40 mg/0.4 mL injectable solution:  injectable once a day (19 Dec 2020 19:56)  memantine 5 mg oral tablet: 1 tab(s) orally once a day (14 Dec 2020 15:22)  metFORMIN 500 mg oral tablet: 1 tab(s) orally 2 times a day (14 Dec 2020 15:22)    MEDICATIONS  (STANDING):  dexAMETHasone  Injectable 6 milliGRAM(s) IV Push daily  dextrose 40% Gel 15 Gram(s) Oral once  dextrose 5%. 1000 milliLiter(s) (50 mL/Hr) IV Continuous <Continuous>  dextrose 5%. 1000 milliLiter(s) (100 mL/Hr) IV Continuous <Continuous>  dextrose 50% Injectable 25 Gram(s) IV Push once  dextrose 50% Injectable 12.5 Gram(s) IV Push once  dextrose 50% Injectable 25 Gram(s) IV Push once  diltiazem Infusion 10 mG/Hr (10 mL/Hr) IV Continuous <Continuous>  glucagon  Injectable 1 milliGRAM(s) IntraMuscular once  insulin lispro (ADMELOG) corrective regimen sliding scale   SubCutaneous every 6 hours  meropenem  IVPB 1000 milliGRAM(s) IV Intermittent every 12 hours  phenylephrine    Infusion 0.5 MICROgram(s)/kG/Min (14.5 mL/Hr) IV Continuous <Continuous>  sodium bicarbonate  Infusion 0.195 mEq/kG/Hr (100 mL/Hr) IV Continuous <Continuous>  vancomycin  IVPB 1000 milliGRAM(s) IV Intermittent once    MEDICATIONS  (PRN):  ondansetron Injectable 4 milliGRAM(s) IV Push every 6 hours PRN Nausea and/or Vomiting    Vital Signs Last 24 Hrs  T(C): 36.4 (20 Dec 2020 21:00), Max: 37.1 (20 Dec 2020 02:35)  T(F): 97.5 (20 Dec 2020 21:00), Max: 98.8 (20 Dec 2020 02:35)  HR: 93 (20 Dec 2020 23:17) (55 - 166)  BP: 92/63 (20 Dec 2020 23:00) (56/41 - 153/101)  BP(mean): 72 (20 Dec 2020 23:00) (34 - 117)  RR: 44 (20 Dec 2020 23:00) (22 - 46)  SpO2: 98% (20 Dec 2020 23:17) (68% - 99%)    PHYSICAL EXAM:  GENERAL: On BiPAP  HEAD:  Atraumatic, Normocephalic  EYES: Pallor +  ENMT: Moist mucous membranes, no lesions  NECK: Supple.  CHEST/LUNG: Decreased breath sounds at bases, occasional rhonchi +  HEART: S1, S2.   ABDOMEN: Soft, Nontender, Nondistended; Bowel sounds present  EXTREMITIES:  2+ Peripheral Pulses,  MS: No joint swelling or deformity.   LYMPH: No lymphadenopathy noted  SKIN: No rashes or lesions  NERVOUS SYSTEM:  lethargic.      LABS:                         13.7   9.32  )-----------( 248      ( 20 Dec 2020 13:38 )             40.0     20 Dec 2020 09:06    133    |  100    |  78     ----------------------------<  177    4.9     |  12     |  3.20     Ca    8.1        20 Dec 2020 09:06    TPro  5.6    /  Alb  1.8    /  TBili  1.9    /  DBili  .90    /  AST  218    /  ALT  91     /  AlkPhos  128    20 Dec 2020 09:37    CAPILLARY BLOOD GLUCOSE  POCT Blood Glucose.: 131 mg/dL (20 Dec 2020 18:21)  POCT Blood Glucose.: 138 mg/dL (20 Dec 2020 12:12)  POCT Blood Glucose.: 227 mg/dL (20 Dec 2020 05:03)  POCT Blood Glucose.: 203 mg/dL (20 Dec 2020 04:33)    PT/INR - ( 20 Dec 2020 01:24 )   PT: 12.6 sec;   INR: 1.08 ratio         PTT - ( 20 Dec 2020 01:24 )  PTT:34.2 sec      Ferritin, Serum: 88126 ng/mL (12-20 @ 22:45)  Ferritin, Serum: 1748 ng/mL (12-18 @ 11:03)  Ferritin, Serum: 904 ng/mL (12-16 @ 15:28)  Ferritin, Serum: 664 ng/mL (12-15 @ 09:17)  Ferritin, Serum: 640 ng/mL (12-14 @ 17:41)    Thyroid Stimulating Hormone, Serum: 0.86 uIU/mL (12-20 @ 09:06)    Creatine Kinase, Serum: 298 U/L (12-20 @ 09:06)      Culture Results:   No growth to date. (12-17 @ 18:06)  Culture Results:   No growth to date. (12-17 @ 18:06)    Procalcitonin, Serum: 14.77 ng/mL (12-20-20 @ 16:43)  Procalcitonin, Serum: 1.54 ng/mL (12-20-20 @ 01:24)  Procalcitonin, Serum: 0.49 ng/mL (12-18-20 @ 07:31)          RADIOLOGY TEST: (IMAGES REVIEWED BY ME)    Imaging Personally Reviewed:  [X] YES      HEALTH ISSUES - PROBLEM Dx:  Hypokalemia  Hypokalemia    Hypotension  Hypotension    Pneumonia due to COVID-19 virus  Pneumonia due to COVID-19 virus    Gout  Gout    Hyperlipidemia, unspecified hyperlipidemia type  Hyperlipidemia, unspecified hyperlipidemia type    Type 2 diabetes mellitus with other specified complication, without long-term current use of insulin  Type 2 diabetes mellitus with other specified complication, without long-term current use of insulin    Essential hypertension  Essential hypertension    COVID-19  COVID-19

## 2020-12-21 DIAGNOSIS — J96.90 RESPIRATORY FAILURE, UNSPECIFIED, UNSPECIFIED WHETHER WITH HYPOXIA OR HYPERCAPNIA: ICD-10-CM

## 2020-12-21 LAB
ALBUMIN SERPL ELPH-MCNC: 1.2 G/DL — LOW (ref 3.3–5)
ALBUMIN SERPL ELPH-MCNC: 1.3 G/DL — LOW (ref 3.3–5)
ALP SERPL-CCNC: 80 U/L — SIGNIFICANT CHANGE UP (ref 40–120)
ALP SERPL-CCNC: 84 U/L — SIGNIFICANT CHANGE UP (ref 40–120)
ALT FLD-CCNC: 82 U/L — HIGH (ref 12–78)
ALT FLD-CCNC: 86 U/L — HIGH (ref 12–78)
ANION GAP SERPL CALC-SCNC: 10 MMOL/L — SIGNIFICANT CHANGE UP (ref 5–17)
ANION GAP SERPL CALC-SCNC: 17 MMOL/L — SIGNIFICANT CHANGE UP (ref 5–17)
APPEARANCE UR: ABNORMAL
APTT BLD: 40.7 SEC — HIGH (ref 27.5–35.5)
AST SERPL-CCNC: 156 U/L — HIGH (ref 15–37)
AST SERPL-CCNC: 159 U/L — HIGH (ref 15–37)
BACTERIA # UR AUTO: ABNORMAL
BASOPHILS # BLD AUTO: 0.05 K/UL — SIGNIFICANT CHANGE UP (ref 0–0.2)
BASOPHILS NFR BLD AUTO: 0.5 % — SIGNIFICANT CHANGE UP (ref 0–2)
BILIRUB DIRECT SERPL-MCNC: 1.4 MG/DL — HIGH (ref 0.05–0.2)
BILIRUB INDIRECT FLD-MCNC: 0.4 MG/DL — SIGNIFICANT CHANGE UP (ref 0.2–1)
BILIRUB SERPL-MCNC: 1.7 MG/DL — HIGH (ref 0.2–1.2)
BILIRUB SERPL-MCNC: 1.8 MG/DL — HIGH (ref 0.2–1.2)
BILIRUB UR-MCNC: NEGATIVE — SIGNIFICANT CHANGE UP
BUN SERPL-MCNC: 76 MG/DL — HIGH (ref 7–23)
BUN SERPL-MCNC: 90 MG/DL — HIGH (ref 7–23)
CALCIUM SERPL-MCNC: 6 MG/DL — CRITICAL LOW (ref 8.5–10.1)
CALCIUM SERPL-MCNC: 7.3 MG/DL — LOW (ref 8.5–10.1)
CHLORIDE SERPL-SCNC: 83 MMOL/L — LOW (ref 96–108)
CHLORIDE SERPL-SCNC: 96 MMOL/L — SIGNIFICANT CHANGE UP (ref 96–108)
CO2 SERPL-SCNC: 23 MMOL/L — SIGNIFICANT CHANGE UP (ref 22–31)
CO2 SERPL-SCNC: 39 MMOL/L — HIGH (ref 22–31)
COLOR SPEC: YELLOW — SIGNIFICANT CHANGE UP
COMMENT - URINE: SIGNIFICANT CHANGE UP
CREAT SERPL-MCNC: 3.2 MG/DL — HIGH (ref 0.5–1.3)
CREAT SERPL-MCNC: 3.7 MG/DL — HIGH (ref 0.5–1.3)
DIFF PNL FLD: ABNORMAL
EOSINOPHIL # BLD AUTO: 0 K/UL — SIGNIFICANT CHANGE UP (ref 0–0.5)
EOSINOPHIL NFR BLD AUTO: 0 % — SIGNIFICANT CHANGE UP (ref 0–6)
EPI CELLS # UR: SIGNIFICANT CHANGE UP
FERRITIN SERPL-MCNC: 4391 NG/ML — HIGH (ref 30–400)
FERRITIN SERPL-MCNC: HIGH NG/ML (ref 30–400)
GLUCOSE SERPL-MCNC: 182 MG/DL — HIGH (ref 70–99)
GLUCOSE SERPL-MCNC: 797 MG/DL — CRITICAL HIGH (ref 70–99)
GLUCOSE UR QL: NEGATIVE — SIGNIFICANT CHANGE UP
HCT VFR BLD CALC: 28.6 % — LOW (ref 39–50)
HGB BLD-MCNC: 10.4 G/DL — LOW (ref 13–17)
IMM GRANULOCYTES NFR BLD AUTO: 7.9 % — HIGH (ref 0–1.5)
INR BLD: 1.45 RATIO — HIGH (ref 0.88–1.16)
KETONES UR-MCNC: NEGATIVE — SIGNIFICANT CHANGE UP
LACTATE SERPL-SCNC: 7.2 MMOL/L — CRITICAL HIGH (ref 0.7–2)
LEUKOCYTE ESTERASE UR-ACNC: NEGATIVE — SIGNIFICANT CHANGE UP
LYMPHOCYTES # BLD AUTO: 0.38 K/UL — LOW (ref 1–3.3)
LYMPHOCYTES # BLD AUTO: 4 % — LOW (ref 13–44)
MAGNESIUM SERPL-MCNC: 2.2 MG/DL — SIGNIFICANT CHANGE UP (ref 1.6–2.6)
MCHC RBC-ENTMCNC: 30.8 PG — SIGNIFICANT CHANGE UP (ref 27–34)
MCHC RBC-ENTMCNC: 36.4 GM/DL — HIGH (ref 32–36)
MCV RBC AUTO: 84.6 FL — SIGNIFICANT CHANGE UP (ref 80–100)
MONOCYTES # BLD AUTO: 0.17 K/UL — SIGNIFICANT CHANGE UP (ref 0–0.9)
MONOCYTES NFR BLD AUTO: 1.8 % — LOW (ref 2–14)
NEUTROPHILS # BLD AUTO: 8.09 K/UL — HIGH (ref 1.8–7.4)
NEUTROPHILS NFR BLD AUTO: 85.8 % — HIGH (ref 43–77)
NITRITE UR-MCNC: NEGATIVE — SIGNIFICANT CHANGE UP
NRBC # BLD: 0 /100 WBCS — SIGNIFICANT CHANGE UP (ref 0–0)
PH UR: 5 — SIGNIFICANT CHANGE UP (ref 5–8)
PHOSPHATE SERPL-MCNC: 4.4 MG/DL — SIGNIFICANT CHANGE UP (ref 2.5–4.5)
PLATELET # BLD AUTO: 154 K/UL — SIGNIFICANT CHANGE UP (ref 150–400)
POTASSIUM SERPL-MCNC: 3 MMOL/L — LOW (ref 3.5–5.3)
POTASSIUM SERPL-MCNC: 3.6 MMOL/L — SIGNIFICANT CHANGE UP (ref 3.5–5.3)
POTASSIUM SERPL-SCNC: 3 MMOL/L — LOW (ref 3.5–5.3)
POTASSIUM SERPL-SCNC: 3.6 MMOL/L — SIGNIFICANT CHANGE UP (ref 3.5–5.3)
PROT SERPL-MCNC: 3.7 G/DL — LOW (ref 6–8.3)
PROT SERPL-MCNC: 3.9 G/DL — LOW (ref 6–8.3)
PROT UR-MCNC: NEGATIVE — SIGNIFICANT CHANGE UP
PROTHROM AB SERPL-ACNC: 16.7 SEC — HIGH (ref 10.6–13.6)
RBC # BLD: 3.38 M/UL — LOW (ref 4.2–5.8)
RBC # FLD: 12.8 % — SIGNIFICANT CHANGE UP (ref 10.3–14.5)
RBC CASTS # UR COMP ASSIST: ABNORMAL /HPF (ref 0–4)
SODIUM SERPL-SCNC: 132 MMOL/L — LOW (ref 135–145)
SODIUM SERPL-SCNC: 136 MMOL/L — SIGNIFICANT CHANGE UP (ref 135–145)
SP GR SPEC: 1.01 — SIGNIFICANT CHANGE UP (ref 1.01–1.02)
UROBILINOGEN FLD QL: NEGATIVE — SIGNIFICANT CHANGE UP
WBC # BLD: 9.44 K/UL — SIGNIFICANT CHANGE UP (ref 3.8–10.5)
WBC # FLD AUTO: 9.44 K/UL — SIGNIFICANT CHANGE UP (ref 3.8–10.5)
WBC UR QL: SIGNIFICANT CHANGE UP

## 2020-12-21 PROCEDURE — 74018 RADEX ABDOMEN 1 VIEW: CPT | Mod: 26

## 2020-12-21 PROCEDURE — 99291 CRITICAL CARE FIRST HOUR: CPT

## 2020-12-21 PROCEDURE — 99291 CRITICAL CARE FIRST HOUR: CPT | Mod: CS

## 2020-12-21 RX ORDER — POTASSIUM CHLORIDE 20 MEQ
40 PACKET (EA) ORAL ONCE
Refills: 0 | Status: DISCONTINUED | OUTPATIENT
Start: 2020-12-21 | End: 2020-12-21

## 2020-12-21 RX ORDER — HEPARIN SODIUM 5000 [USP'U]/ML
INJECTION INTRAVENOUS; SUBCUTANEOUS
Qty: 25000 | Refills: 0 | Status: DISCONTINUED | OUTPATIENT
Start: 2020-12-21 | End: 2020-12-25

## 2020-12-21 RX ORDER — HEPARIN SODIUM 5000 [USP'U]/ML
6500 INJECTION INTRAVENOUS; SUBCUTANEOUS ONCE
Refills: 0 | Status: COMPLETED | OUTPATIENT
Start: 2020-12-21 | End: 2020-12-21

## 2020-12-21 RX ORDER — POTASSIUM CHLORIDE 20 MEQ
40 PACKET (EA) ORAL ONCE
Refills: 0 | Status: COMPLETED | OUTPATIENT
Start: 2020-12-21 | End: 2020-12-21

## 2020-12-21 RX ORDER — MEROPENEM 1 G/30ML
500 INJECTION INTRAVENOUS EVERY 12 HOURS
Refills: 0 | Status: DISCONTINUED | OUTPATIENT
Start: 2020-12-22 | End: 2020-12-27

## 2020-12-21 RX ORDER — HEPARIN SODIUM 5000 [USP'U]/ML
3000 INJECTION INTRAVENOUS; SUBCUTANEOUS EVERY 6 HOURS
Refills: 0 | Status: DISCONTINUED | OUTPATIENT
Start: 2020-12-21 | End: 2020-12-25

## 2020-12-21 RX ORDER — MEROPENEM 1 G/30ML
500 INJECTION INTRAVENOUS EVERY 12 HOURS
Refills: 0 | Status: DISCONTINUED | OUTPATIENT
Start: 2020-12-21 | End: 2020-12-21

## 2020-12-21 RX ORDER — SODIUM CHLORIDE 9 MG/ML
1000 INJECTION, SOLUTION INTRAVENOUS
Refills: 0 | Status: DISCONTINUED | OUTPATIENT
Start: 2020-12-21 | End: 2020-12-23

## 2020-12-21 RX ORDER — SODIUM BICARBONATE 1 MEQ/ML
0.2 SYRINGE (ML) INTRAVENOUS
Qty: 150 | Refills: 0 | Status: DISCONTINUED | OUTPATIENT
Start: 2020-12-21 | End: 2020-12-21

## 2020-12-21 RX ORDER — HEPARIN SODIUM 5000 [USP'U]/ML
6500 INJECTION INTRAVENOUS; SUBCUTANEOUS EVERY 6 HOURS
Refills: 0 | Status: DISCONTINUED | OUTPATIENT
Start: 2020-12-21 | End: 2020-12-25

## 2020-12-21 RX ADMIN — Medication 4: at 12:47

## 2020-12-21 RX ADMIN — PHENYLEPHRINE HYDROCHLORIDE 14.5 MICROGRAM(S)/KG/MIN: 10 INJECTION INTRAVENOUS at 19:48

## 2020-12-21 RX ADMIN — Medication 250 MILLIGRAM(S): at 00:33

## 2020-12-21 RX ADMIN — Medication 40 MILLIEQUIVALENT(S): at 18:25

## 2020-12-21 RX ADMIN — PHENYLEPHRINE HYDROCHLORIDE 14.5 MICROGRAM(S)/KG/MIN: 10 INJECTION INTRAVENOUS at 02:24

## 2020-12-21 RX ADMIN — Medication 100 MEQ/KG/HR: at 00:33

## 2020-12-21 RX ADMIN — Medication 6 MILLIGRAM(S): at 06:05

## 2020-12-21 RX ADMIN — MEROPENEM 100 MILLIGRAM(S): 1 INJECTION INTRAVENOUS at 03:54

## 2020-12-21 RX ADMIN — Medication 100 MEQ/KG/HR: at 12:32

## 2020-12-21 RX ADMIN — HEPARIN SODIUM 6500 UNIT(S): 5000 INJECTION INTRAVENOUS; SUBCUTANEOUS at 18:24

## 2020-12-21 RX ADMIN — Medication 4: at 18:24

## 2020-12-21 RX ADMIN — HEPARIN SODIUM 1400 UNIT(S)/HR: 5000 INJECTION INTRAVENOUS; SUBCUTANEOUS at 18:32

## 2020-12-21 RX ADMIN — MEROPENEM 100 MILLIGRAM(S): 1 INJECTION INTRAVENOUS at 18:25

## 2020-12-21 RX ADMIN — Medication 4: at 06:08

## 2020-12-21 RX ADMIN — SODIUM CHLORIDE 100 MILLILITER(S): 9 INJECTION, SOLUTION INTRAVENOUS at 19:48

## 2020-12-21 RX ADMIN — Medication 2: at 00:34

## 2020-12-21 RX ADMIN — Medication 4: at 23:20

## 2020-12-21 NOTE — PROGRESS NOTE ADULT - SUBJECTIVE AND OBJECTIVE BOX
Patient is a 88y old  Male who presents with a chief complaint of weakness, covid (18 Dec 2020 10:34)      Subjective: Patient seen and examined at bedside. Events noted. Transferred to ICU    PAIN: N  DYSPNEA: N	  NAUS/VOM: 	N  SECRETIONS: 	N  AGITATION: N    OTHER REVIEW OF SYSTEMS: negative    Vital Signs Last 24 Hrs  T(C): 36.4 (21 Dec 2020 12:00), Max: 36.6 (21 Dec 2020 00:00)  T(F): 97.5 (21 Dec 2020 12:00), Max: 97.8 (21 Dec 2020 00:00)  HR: 85 (21 Dec 2020 14:00) (59 - 132)  BP: 101/56 (21 Dec 2020 14:00) (80/51 - 133/61)  BP(mean): 74 (21 Dec 2020 14:00) (60 - 91)  RR: 32 (21 Dec 2020 14:00) (3 - 46)  SpO2: 91% (21 Dec 2020 15:13) (89% - 100%)    12-21    136  |  96  |  90<H>  ----------------------------<  182<H>  3.6   |  23  |  3.70<H>    Ca    7.3<L>      21 Dec 2020 10:23  Phos  4.4     12-21  Mg     2.2     12-21    TPro  3.7<L>  /  Alb  1.3<L>  /  TBili  1.7<H>  /  DBili  x   /  AST  156<H>  /  ALT  82<H>  /  AlkPhos  80  12-21                          10.4   9.44  )-----------( 154      ( 21 Dec 2020 05:41 )             28.6     PT/INR - ( 21 Dec 2020 10:23 )   PT: 16.7 sec;   INR: 1.45 ratio         PTT - ( 21 Dec 2020 10:23 )  PTT:40.7 sec  CAPILLARY BLOOD GLUCOSE      POCT Blood Glucose.: 213 mg/dL (21 Dec 2020 12:26)  POCT Blood Glucose.: 217 mg/dL (21 Dec 2020 05:59)  POCT Blood Glucose.: 167 mg/dL (21 Dec 2020 00:30)  POCT Blood Glucose.: 131 mg/dL (20 Dec 2020 18:21)              dexAMETHasone  Injectable 6 milliGRAM(s) IV Push daily  dextrose 40% Gel 15 Gram(s) Oral once  dextrose 5%. 1000 milliLiter(s) IV Continuous <Continuous>  dextrose 5%. 1000 milliLiter(s) IV Continuous <Continuous>  dextrose 50% Injectable 25 Gram(s) IV Push once  dextrose 50% Injectable 12.5 Gram(s) IV Push once  dextrose 50% Injectable 25 Gram(s) IV Push once  glucagon  Injectable 1 milliGRAM(s) IntraMuscular once  insulin lispro (ADMELOG) corrective regimen sliding scale   SubCutaneous every 6 hours  meropenem  IVPB 500 milliGRAM(s) IV Intermittent every 12 hours  ondansetron Injectable 4 milliGRAM(s) IV Push every 6 hours PRN  phenylephrine    Infusion 0.5 MICROgram(s)/kG/Min IV Continuous <Continuous>  potassium chloride    Tablet ER 40 milliEquivalent(s) Oral once  sodium bicarbonate  Infusion 0.195 mEq/kG/Hr IV Continuous <Continuous>        GENERAL:  on bipap  HEENT:  NC/AT EOMI PERRL  NECK: supple no JVD  CVS:  +S1 S2 RRR  RESP: decreased bs  GI:  soft NT/ND +BS  : no suprapubic tenderness  MUSC:  no lower extremity edema  SKIN:  Warm, moist, no rashes   LYMPH: normal     MEDS REVIEWED	            ADVANCED DIRECTIVES:         DNR     DNI    MOLST    PSYCHOSOCIAL-SPIRITUAL ASSESSMENT:    _x__Reviewed     x___Care  plan unchanged     ___Care plan adjusted as below    GOALS OF CARE DISCUSSION  	x___Palliative care info/counseling provided	    ___Family meeting  	_x__Advanced Directives addressed	    _x__See previous Palliative Medicine Note    AGENCY CHOICE DISCUSSED:   ___HOSPICE   ___CALVARY  ___OTHER:              > 50% OF THE TIME SPENT IN COUNSELING AND COORDINATING CARE 	    Minutes:      PROLONGED SERVICE             FACE TO FACE:    PT            PT & FAMILY	       Minutes:      Advance Care Planning Time:

## 2020-12-21 NOTE — PROGRESS NOTE ADULT - ASSESSMENT
88M with PMHx HTN, DM, HLD, dementia who presented to the hospital with weakness and fever. Recently dx'd with COVID.  Admitted to medical service with COVID PNA. Receiving steroids and Remdesivir.   Now with AF RVR, transferred to ICU for hypotension and tachycardiac    AF:  - rates currently controlled on Dilt though this is limited given hypotension  - given hypotension would give renal loading dose of dig.   - resume full dose AC    HF:  - unknown LV function  - defer diuresis at this point given hypotension and pressor requirements   - monitor renal function and electrolytes  - can check TTE    - stricts I&O's, daily weights    COVID  - BiPAP  - on remdesivir and decadron  - pulm/ICU input appreciated    AZALEA  - Cr worsening.   - strict I/Os, goal even to negative fluid balance,   - trend Cr      - Monitor and replete lytes, keep K>4 and Mg >2  - Further cardiac workup will depend on clinical course.   - All other workup per primary team  - Pt DNR/DNI

## 2020-12-21 NOTE — PROGRESS NOTE ADULT - ASSESSMENT
AZALEA on CKD  COVID 19+  Metabolic Acidosis    -BLCR 1.4, Creatinine uptrending  -AZALEA 2/2 hypoxemic injury  -Check Urin lytes  -UA 30 protein mod blood  -On bicarb gtt  -Continue mueller--put out about 4.L today  -Strict &Os  -On Phenylephrine  -Patient DNR/DNI  -No acute indication for dialysis, but would be poor candidate       d/w ICU   AZALEA on CKD  COVID 19+  Metabolic Acidosis    -BLCR 1.4, Creatinine uptrending  -AZALEA 2/2 hypoxemic injury  -Check Urine lytes  -UA 30 protein mod blood  -On bicarb gtt, lactate improving  -Continue mueller--UOP about 4.L today  -Strict &Os  -On Phenylephrine  -Patient DNR/DNI  -No acute indication for dialysis      d/w ICU

## 2020-12-21 NOTE — PROGRESS NOTE ADULT - SUBJECTIVE AND OBJECTIVE BOX
[facility] -- Alethea Christian, Santana North Pannella, Patel, Savella  Office # [phone]      Follow Up:  AF, resp failure    Subjective/Observations: Patient is seen and examined. Currently in ICU. He is on BIPAP.  Maintained on pressor support. Pt is unable to provide any meaningful information.       REVIEW OF SYSTEMS: Limited 2/2 comorbidities     PAST MEDICAL & SURGICAL HISTORY:  Gout    Hyperlipidemia    Hypertension        MEDICATIONS  (STANDING):  dexAMETHasone  Injectable 6 milliGRAM(s) IV Push daily  dextrose 40% Gel 15 Gram(s) Oral once  dextrose 5%. 1000 milliLiter(s) (50 mL/Hr) IV Continuous <Continuous>  dextrose 5%. 1000 milliLiter(s) (100 mL/Hr) IV Continuous <Continuous>  dextrose 50% Injectable 25 Gram(s) IV Push once  dextrose 50% Injectable 12.5 Gram(s) IV Push once  dextrose 50% Injectable 25 Gram(s) IV Push once  diltiazem Infusion 10 mG/Hr (10 mL/Hr) IV Continuous <Continuous>  glucagon  Injectable 1 milliGRAM(s) IntraMuscular once  insulin lispro (ADMELOG) corrective regimen sliding scale   SubCutaneous every 6 hours  meropenem  IVPB 1000 milliGRAM(s) IV Intermittent every 12 hours  phenylephrine    Infusion 0.5 MICROgram(s)/kG/Min (14.5 mL/Hr) IV Continuous <Continuous>  sodium bicarbonate  Infusion 0.195 mEq/kG/Hr (100 mL/Hr) IV Continuous <Continuous>    MEDICATIONS  (PRN):  ondansetron Injectable 4 milliGRAM(s) IV Push every 6 hours PRN Nausea and/or Vomiting      Allergies    penicillins (Unknown)  sulfa drugs (Unknown)    Intolerances            Vital Signs Last 24 Hrs  T(C): 35.5 (21 Dec 2020 05:42), Max: 36.6 (21 Dec 2020 00:00)  T(F): 95.9 (21 Dec 2020 05:42), Max: 97.8 (21 Dec 2020 00:00)  HR: 76 (21 Dec 2020 07:59) (55 - 132)  BP: 100/70 (21 Dec 2020 07:00) (65/29 - 145/104)  BP(mean): 81 (21 Dec 2020 07:00) (34 - 114)  RR: 39 (21 Dec 2020 07:00) (34 - 46)  SpO2: 99% (21 Dec 2020 07:59) (79% - 99%)    I&O's Summary    20 Dec 2020 07:01  -  21 Dec 2020 07:00  --------------------------------------------------------  IN: 3119 mL / OUT: 375 mL / NET: 2744 mL          PHYSICAL EXAM:  TELE: artifact AF   Constitutional: NAD, awake tachypneic  HEENT: Bipap in place  Pulmonary: Decreased breath sounds b/l. No rales, crackles or wheeze appreciated.   Cardiovascular: IRRR, S1 and S2, No murmurs, rubs, gallops or clicks  Gastrointestinal: Bowel Sounds present, soft, nontender.   Lymph: No peripheral edema. No lymphadenopathy.  Skin: No visible rashes or ulcers.  Psych:  Unable to provide meaningful information.     LABS: All Labs Reviewed:                        10.4   9.44  )-----------( 154      ( 21 Dec 2020 05:41 )             28.6                         13.7   9.32  )-----------( 248      ( 20 Dec 2020 13:38 )             40.0                         13.9   10.06 )-----------( 184      ( 19 Dec 2020 07:29 )             39.2     21 Dec 2020 05:42    132    |  83     |  76     ----------------------------<  797    3.0     |  39     |  3.20   20 Dec 2020 09:06    133    |  100    |  78     ----------------------------<  177    4.9     |  12     |  3.20   19 Dec 2020 07:29    x      |  x      |  x      ----------------------------<  x      x       |  x      |  1.70     Ca    6.0        21 Dec 2020 05:42  Ca    8.1        20 Dec 2020 09:06  Ca    8.0        18 Dec 2020 09:59  Phos  4.4       21 Dec 2020 05:42  Mg     2.2       21 Dec 2020 05:42    TPro  3.7    /  Alb  1.3    /  TBili  1.7    /  DBili  x      /  AST  156    /  ALT  82     /  AlkPhos  80     21 Dec 2020 05:42  TPro  3.9    /  Alb  1.2    /  TBili  1.8    /  DBili  1.40   /  AST  159    /  ALT  86     /  AlkPhos  84     21 Dec 2020 05:41  TPro  5.6    /  Alb  1.8    /  TBili  1.9    /  DBili  .90    /  AST  218    /  ALT  91     /  AlkPhos  128    20 Dec 2020 09:37    PT/INR - ( 20 Dec 2020 01:24 )   PT: 12.6 sec;   INR: 1.08 ratio         PTT - ( 20 Dec 2020 01:24 )  PTT:34.2 sec  CARDIAC MARKERS ( 20 Dec 2020 09:06 )  x     / x     / 298 U/L / x     / x           c

## 2020-12-21 NOTE — PROGRESS NOTE ADULT - ASSESSMENT
Patient is a 89 yo M with HTN DM HLD dementia (walks with cane) who p/w gen weakness x past several days. fever, recently diagnosed with COVID sent to hospital as family can no longer care for this patient at home - 12/14 first COVID+ PCR    RECOMMENDATIONS  12/14 NLR 3.47/0.46=7.5 97% on RA, would NOT start steroid as pt sats fine, rec LMWH prophylactic dose, with no hypoxemia and unclear duration rec NO remdesivir  blood with what appears to be a contaminant, urine with <100k enterococcus  12/15 agree with stopping steroids, continue LMWH   12/16 NLR 4/0.5=8 blood cultures with what appear to be contaminant no further Rx, on RA, continued fevers  12/17 now on NC-3.5L some concerns for secondary process will send off further testing, STEROIDs started  12/18 NC-3L, meeting criteria so REMDESIVIR started  12/19 NLR 8.92/0.65=13.7, NC 4L, continue remdesivir, steroids, lovenox, monitor CBC with diff, inflammatory markers  12/20 noted to be hypothermic today with rapid afiba and worsening CXR, transferred to ICU, placed on BIPAP. Lactic acid ~10, LFTs now elevated with AST >5x ULN and Cr 3.2 with GFR <30 - stopped remdesivir. Send for inflammatory markers and CBC with diff - will follow to evaluate for possible Tocilizumab. Send blood cultures. Start empirically on MEROPENEM . FPR 45758/[id number], bandemia 19%, worsening CXR  12/21 NLR 8.09/0.38=21.3, prior enterococcus in urine on 12/14, rec sending sputum, urine, follow blood cultures, suspect secondary bacterial infection, consider de-escalation to Zosyn, send fungitell, aspergillus galactamanan

## 2020-12-21 NOTE — PROGRESS NOTE ADULT - ASSESSMENT
2.17.2020 This is an 88 M with dementia comes with COVID PNA. Patient requires help with ADLs. He lives with daughter and wife. His daughter is his HCP. Daughter reports that both her parents have advanced directives that state that they are a DNR/DNI. She would like to complete MOLST. MOLST was reviewed, witnessed and signed. DNR/DNI order entered into EMR.    12.18.2020 Patient still febrile and requiring oxygen. Poor po intake. On remdisivir and decadron.    12.19.2020 Afebrile x 24 hours. Continues to require oxygen.    12.20.2020 Transferred to ICU with acute on chronic hypoxic respiratory failure secondary to COVID PNA. Patient currently on BIPAP. Overall prognosis poor. Daughter aware. Patient remains a DNR/DNI.    12.21.2020 Cr worsening. Not candidate for HD. Off BIPAP. Tolerating high-flow. Overall prognosis poor.

## 2020-12-21 NOTE — PROGRESS NOTE ADULT - SUBJECTIVE AND OBJECTIVE BOX
Patient is a 88y old  Male who presents with a chief complaint of weakness, covid (21 Dec 2020 07:23)    24 hour events: ***    REVIEW OF SYSTEMS  Constitutional: No fever, chills, fatigue  Neuro: No headache, numbness, weakness  Resp: No cough, wheezing, shortness of breath  CVS: No chest pain, palpitations, leg swelling  GI: No abdominal pain, nausea, vomiting, diarrhea   : No dysuria, frequency, incontinence  Skin: No itching, burning, rashes, or lesions   Msk: No joint pain or swelling  Psych: No depression, anxiety, mood swings  Heme: No bleeding    T(F): 95.9 (12-21-20 @ 05:42), Max: 97.8 (12-21-20 @ 00:00)  HR: 80 (12-21-20 @ 07:00) (55 - 134)  BP: 100/70 (12-21-20 @ 07:00) (65/29 - 153/101)  RR: 39 (12-21-20 @ 07:00) (34 - 46)  SpO2: 97% (12-21-20 @ 07:00) (68% - 99%)  Wt(kg): --            I&O's Summary    12-20 @ 07:01  -  12-21 @ 07:00  --------------------------------------------------------  IN: 3119 mL / OUT: 375 mL / NET: 2744 mL      PHYSICAL EXAM  General:   CNS:   HEENT:   Resp:   CVS:   Abd:   Ext:   Skin:     MEDICATIONS  meropenem  IVPB IV Intermittent    diltiazem Infusion IV Continuous  phenylephrine    Infusion IV Continuous    dexAMETHasone  Injectable IV Push  dextrose 40% Gel Oral  dextrose 50% Injectable IV Push  dextrose 50% Injectable IV Push  dextrose 50% Injectable IV Push  glucagon  Injectable IntraMuscular  insulin lispro (ADMELOG) corrective regimen sliding scale SubCutaneous      ondansetron Injectable IV Push PRN            dextrose 5%. IV Continuous  dextrose 5%. IV Continuous  sodium bicarbonate  Infusion IV Continuous                                10.4   9.44  )-----------( 154      ( 21 Dec 2020 05:41 )             28.6     Bands 19.0    12-21    132<L>  |  83<L>  |  76<H>  ----------------------------<  797<HH>  3.0<L>   |  39<H>  |  3.20<H>    Ca    6.0<LL>      21 Dec 2020 05:42  Phos  4.4     12-21  Mg     2.2     12-21    TPro  3.7<L>  /  Alb  1.3<L>  /  TBili  1.7<H>  /  DBili  x   /  AST  156<H>  /  ALT  82<H>  /  AlkPhos  80  12-21    Lactate 10.0           12-20 @ 16:43    Lactate 10.7           12-20 @ 11:02      CARDIAC MARKERS ( 20 Dec 2020 09:06 )  x     / x     / 298 U/L / x     / x          PT/INR - ( 20 Dec 2020 01:24 )   PT: 12.6 sec;   INR: 1.08 ratio         PTT - ( 20 Dec 2020 01:24 )  PTT:34.2 sec    .Blood Blood-Peripheral   No growth to date. -- 12-17 @ 18:06        Radiology: ***  Bedside lung ultrasound: ***  Bedside ECHO: ***    CENTRAL LINE: Y/N          DATE INSERTED:              REMOVE: Y/N  MADRIGAL: Y/N                        DATE INSERTED:              REMOVE: Y/N  A-LINE: Y/N                       DATE INSERTED:              REMOVE: Y/N    GLOBAL ISSUE/BEST PRACTICE  Analgesia:   Sedation:   CAM-ICU:   HOB elevation: yes  Stress ulcer prophylaxis:   VTE prophylaxis:   Glycemic control:   Nutrition:     CODE STATUS: ***  [facility] discussion: Y       Patient is a 88y old  Male who presents with a chief complaint of weakness, covid (21 Dec 2020 07:23)    24 hour events: No acute overnight events.     REVIEW OF SYSTEMS: Unable to obtain due to clinical status     T(F): 95.9 (12-21-20 @ 05:42), Max: 97.8 (12-21-20 @ 00:00)  HR: 80 (12-21-20 @ 07:00) (55 - 134)  BP: 100/70 (12-21-20 @ 07:00) (65/29 - 153/101)  RR: 39 (12-21-20 @ 07:00) (34 - 46)  SpO2: 97% (12-21-20 @ 07:00) (68% - 99%)  Wt(kg): --            I&O's Summary    12-20 @ 07:01  -  12-21 @ 07:00  --------------------------------------------------------  IN: 3119 mL / OUT: 375 mL / NET: 2744 mL      PHYSICAL EXAM  GENERAL: on bipap, NAD  LUNGS: clear to auscultation, no intercostal retractions  HEART: S1/S2, regular rate and rhythm, no murmurs noted  GASTROINTESTINAL: +tenderness; soft, nondistended  INTEGUMENT: good skin turgor, warm skin, appears well perfused  MUSCULOSKELETAL: RLE edema, no clubbing or cyanosis    MEDICATIONS  meropenem  IVPB IV Intermittent    diltiazem Infusion IV Continuous  phenylephrine    Infusion IV Continuous    dexAMETHasone  Injectable IV Push  dextrose 40% Gel Oral  dextrose 50% Injectable IV Push  dextrose 50% Injectable IV Push  dextrose 50% Injectable IV Push  glucagon  Injectable IntraMuscular  insulin lispro (ADMELOG) corrective regimen sliding scale SubCutaneous      ondansetron Injectable IV Push PRN            dextrose 5%. IV Continuous  dextrose 5%. IV Continuous  sodium bicarbonate  Infusion IV Continuous                                10.4   9.44  )-----------( 154      ( 21 Dec 2020 05:41 )             28.6     Bands 19.0    12-21    132<L>  |  83<L>  |  76<H>  ----------------------------<  797<HH>  3.0<L>   |  39<H>  |  3.20<H>    Ca    6.0<LL>      21 Dec 2020 05:42  Phos  4.4     12-21  Mg     2.2     12-21    TPro  3.7<L>  /  Alb  1.3<L>  /  TBili  1.7<H>  /  DBili  x   /  AST  156<H>  /  ALT  82<H>  /  AlkPhos  80  12-21    Lactate 10.0           12-20 @ 16:43    Lactate 10.7           12-20 @ 11:02      CARDIAC MARKERS ( 20 Dec 2020 09:06 )  x     / x     / 298 U/L / x     / x          PT/INR - ( 20 Dec 2020 01:24 )   PT: 12.6 sec;   INR: 1.08 ratio         PTT - ( 20 Dec 2020 01:24 )  PTT:34.2 sec    .Blood Blood-Peripheral   No growth to date. -- 12-17 @ 18:06      Bedside lung ultrasound: b/l B-lines and consolidations    Bedside ECHO: limited cardiac views    CENTRAL LINE: N           MADRIGAL: Y                          DATE INSERTED: 12/20/20  A-LINE: N                           GLOBAL ISSUE/BEST PRACTICE  Analgesia: N  Sedation: N  HOB elevation: yes  Stress ulcer prophylaxis:   VTE prophylaxis: Y  Glycemic control: Y  Nutrition: NPO    CODE STATUS: DNR/DNI  GOC discussion: Y

## 2020-12-21 NOTE — PROGRESS NOTE ADULT - SUBJECTIVE AND OBJECTIVE BOX
Date/Time Patient Seen:  		  Referring MD:   Data Reviewed	       Patient is a 88y old  Male who presents with a chief complaint of weakness, covid (20 Dec 2020 20:01)      Subjective/HPI     PAST MEDICAL & SURGICAL HISTORY:  Gout    Hyperlipidemia    Hypertension          Medication list         MEDICATIONS  (STANDING):  dexAMETHasone  Injectable 6 milliGRAM(s) IV Push daily  dextrose 40% Gel 15 Gram(s) Oral once  dextrose 5%. 1000 milliLiter(s) (50 mL/Hr) IV Continuous <Continuous>  dextrose 5%. 1000 milliLiter(s) (100 mL/Hr) IV Continuous <Continuous>  dextrose 50% Injectable 25 Gram(s) IV Push once  dextrose 50% Injectable 12.5 Gram(s) IV Push once  dextrose 50% Injectable 25 Gram(s) IV Push once  diltiazem Infusion 10 mG/Hr (10 mL/Hr) IV Continuous <Continuous>  glucagon  Injectable 1 milliGRAM(s) IntraMuscular once  insulin lispro (ADMELOG) corrective regimen sliding scale   SubCutaneous every 6 hours  meropenem  IVPB 1000 milliGRAM(s) IV Intermittent every 12 hours  phenylephrine    Infusion 0.5 MICROgram(s)/kG/Min (14.5 mL/Hr) IV Continuous <Continuous>  sodium bicarbonate  Infusion 0.195 mEq/kG/Hr (100 mL/Hr) IV Continuous <Continuous>    MEDICATIONS  (PRN):  ondansetron Injectable 4 milliGRAM(s) IV Push every 6 hours PRN Nausea and/or Vomiting         Vitals log        ICU Vital Signs Last 24 Hrs  T(C): 35.5 (21 Dec 2020 05:42), Max: 36.6 (21 Dec 2020 00:00)  T(F): 95.9 (21 Dec 2020 05:42), Max: 97.8 (21 Dec 2020 00:00)  HR: 80 (21 Dec 2020 07:00) (55 - 134)  BP: 100/70 (21 Dec 2020 07:00) (56/41 - 153/101)  BP(mean): 81 (21 Dec 2020 07:00) (34 - 117)  ABP: --  ABP(mean): --  RR: 39 (21 Dec 2020 07:00) (34 - 46)  SpO2: 97% (21 Dec 2020 07:00) (68% - 99%)           Input and Output:  I&O's Detail    20 Dec 2020 07:01  -  21 Dec 2020 07:00  --------------------------------------------------------  IN:    Amiodarone: 133.2 mL    Diltiazem: 20 mL    IV PiggyBack: 250 mL    IV PiggyBack: 200 mL    Phenylephrine: 415.8 mL    Sodium Bicarbonate: 2100 mL  Total IN: 3119 mL    OUT:    Diltiazem: 0 mL    Incontinent per Condom Catheter (mL): 375 mL  Total OUT: 375 mL    Total NET: 2744 mL          Lab Data                        10.4   9.44  )-----------( 154      ( 21 Dec 2020 05:41 )             28.6     12-21    132<L>  |  83<L>  |  76<H>  ----------------------------<  797<HH>  3.0<L>   |  39<H>  |  3.20<H>    Ca    6.0<LL>      21 Dec 2020 05:42  Phos  4.4     12-21  Mg     2.2     12-21    TPro  3.7<L>  /  Alb  1.3<L>  /  TBili  1.7<H>  /  DBili  x   /  AST  156<H>  /  ALT  82<H>  /  AlkPhos  80  12-21      CARDIAC MARKERS ( 20 Dec 2020 09:06 )  x     / x     / 298 U/L / x     / x            Review of Systems	      Objective     Physical Examination    heart s1s2  lung dec BS  abd soft      Pertinent Lab findings & Imaging      Tariq:  NO   Adequate UO     I&O's Detail    20 Dec 2020 07:01  -  21 Dec 2020 07:00  --------------------------------------------------------  IN:    Amiodarone: 133.2 mL    Diltiazem: 20 mL    IV PiggyBack: 250 mL    IV PiggyBack: 200 mL    Phenylephrine: 415.8 mL    Sodium Bicarbonate: 2100 mL  Total IN: 3119 mL    OUT:    Diltiazem: 0 mL    Incontinent per Condom Catheter (mL): 375 mL  Total OUT: 375 mL    Total NET: 2744 mL               Discussed with:     Cultures:	        Radiology

## 2020-12-21 NOTE — PROGRESS NOTE ADULT - SUBJECTIVE AND OBJECTIVE BOX
[facility] DIVISION of INFECTIOUS DISEASE  Denver Lew MD PhD, Maxine Rodriguez MD, Anahy Syed MD, Jelena Regalado MD  and providing coverage with Katherine Martinez MD and Mala Bray MD  Providing Infectious Disease Consultations at [facility], [facility], [facility]'s      Office# [phone] to schedule follow up appointments  Answering Service for urgent calls or New Consults [phone]  Cell# to text for urgent issues Denver Lew [phone]     Infectious diseases progress note:    SPENCER LEVY is a 88y y. o. Male patient    COVID Patient, intubated and in ICU    Allergies    penicillins (Unknown)  sulfa drugs (Unknown)    Intolerances        ANTIBIOTICS/RELEVANT:  antimicrobials  meropenem  IVPB 1000 milliGRAM(s) IV Intermittent every 12 hours    immunologic:    OTHER:  dexAMETHasone  Injectable 6 milliGRAM(s) IV Push daily  dextrose 40% Gel 15 Gram(s) Oral once  dextrose 5%. 1000 milliLiter(s) IV Continuous <Continuous>  dextrose 5%. 1000 milliLiter(s) IV Continuous <Continuous>  dextrose 50% Injectable 25 Gram(s) IV Push once  dextrose 50% Injectable 12.5 Gram(s) IV Push once  dextrose 50% Injectable 25 Gram(s) IV Push once  glucagon  Injectable 1 milliGRAM(s) IntraMuscular once  insulin lispro (ADMELOG) corrective regimen sliding scale   SubCutaneous every 6 hours  ondansetron Injectable 4 milliGRAM(s) IV Push every 6 hours PRN  phenylephrine    Infusion 0.5 MICROgram(s)/kG/Min IV Continuous <Continuous>  sodium bicarbonate  Infusion 0.195 mEq/kG/Hr IV Continuous <Continuous>      Objective:  Vital Signs Last 24 Hrs  T(C): 35.5 (21 Dec 2020 05:42), Max: 36.6 (21 Dec 2020 00:00)  T(F): 95.9 (21 Dec 2020 05:42), Max: 97.8 (21 Dec 2020 00:00)  HR: 76 (21 Dec 2020 07:59) (55 - 132)  BP: 100/70 (21 Dec 2020 07:00) (65/29 - 129/67)  BP(mean): 81 (21 Dec 2020 07:00) (34 - 91)  RR: 39 (21 Dec 2020 07:00) (34 - 46)  SpO2: 99% (21 Dec 2020 07:59) (79% - 99%)    T(C): 35.5 (12-21-20 @ 05:42), Max: 37.1 (12-20-20 @ 02:35)  T(C): 35.5 (12-21-20 @ 05:42), Max: 37.1 (12-20-20 @ 02:35)  T(C): 35.5 (12-21-20 @ 05:42), Max: 38.1 (12-18-20 @ 05:05)    PHYSICAL EXAM: Intubated  HEENT: NC atraumatic  Neck: supple  Respiratory: no accessory muscle use, breathing comfortably on vent  Cardiovascular: distant  Gastrointestinal: normal appearing, nondistended  Extremities: no clubbing, no cyanosis,      LABS:                          10.4   9.44  )-----------( 154      ( 21 Dec 2020 05:41 )             28.6       9.44 12-21 @ 05:41  9.32 12-20 @ 13:38  10.06 12-19 @ 07:29  9.34 12-18 @ 09:59  8.33 12-18 @ 07:31  4.87 12-16 @ 10:13  3.31 12-15 @ 06:00  4.14 12-14 @ 12:44      12-21    132<L>  |  83<L>  |  76<H>  ----------------------------<  797<HH>  3.0<L>   |  39<H>  |  3.20<H>    Ca    6.0<LL>      21 Dec 2020 05:42  Phos  4.4     12-21  Mg     2.2     12-21    TPro  3.7<L>  /  Alb  1.3<L>  /  TBili  1.7<H>  /  DBili  x   /  AST  156<H>  /  ALT  82<H>  /  AlkPhos  80  12-21      Creatinine, Serum: 3.20 mg/dL (12-21-20 @ 05:42)  Creatinine, Serum: 3.20 mg/dL (12-20-20 @ 09:06)  Creatinine, Serum: 1.70 mg/dL (12-19-20 @ 07:29)  Creatinine, Serum: 1.40 mg/dL (12-18-20 @ 09:59)  Creatinine, Serum: 1.30 mg/dL (12-16-20 @ 10:13)  Creatinine, Serum: 1.30 mg/dL (12-15-20 @ 06:00)  Creatinine, Serum: 1.40 mg/dL (12-14-20 @ 12:44)      PT/INR - ( 20 Dec 2020 01:24 )   PT: 12.6 sec;   INR: 1.08 ratio         PTT - ( 20 Dec 2020 01:24 )  PTT:34.2 sec          COVID RISK SCORE  Auto Neutrophil #: 8.09 K/uL (12-21-20 @ 05:41)  Auto Lymphocyte #: 0.38 K/uL (12-21-20 @ 05:41)  Auto Neutrophil #: 8.02 K/uL (12-20-20 @ 13:38)  Auto Lymphocyte #: 0.84 K/uL (12-20-20 @ 13:38)  Auto Neutrophil #: 8.92 K/uL (12-19-20 @ 07:29)  Auto Lymphocyte #: 0.65 K/uL (12-19-20 @ 07:29)  Auto Neutrophil #: 7.76 K/uL (12-18-20 @ 07:31)  Auto Lymphocyte #: 0.29 K/uL (12-18-20 @ 07:31)  Auto Neutrophil #: 4.13 K/uL (12-16-20 @ 10:13)  Auto Lymphocyte #: 0.50 K/uL (12-16-20 @ 10:13)  Auto Neutrophil #: 2.56 K/uL (12-15-20 @ 06:00)  Auto Lymphocyte #: 0.57 K/uL (12-15-20 @ 06:00)  Auto Neutrophil #: 3.47 K/uL (12-14-20 @ 12:44)  Auto Lymphocyte #: 0.46 K/uL (12-14-20 @ 12:44)    Lactate, Blood: 10.0 mmol/L (12-20-20 @ 16:43)  Lactate, Blood: 10.7 mmol/L (12-20-20 @ 11:02)  Lactate, Blood: 1.1 mmol/L (12-15-20 @ 06:00)  Lactate, Blood: 2.2 mmol/L (12-14-20 @ 12:44)    Auto Eosinophil #: 0.00 K/uL (12-21-20 @ 05:41)  Auto Eosinophil #: 0.00 K/uL (12-20-20 @ 13:38)  Auto Eosinophil #: 0.00 K/uL (12-19-20 @ 07:29)  Auto Eosinophil #: 0.00 K/uL (12-18-20 @ 07:31)  Auto Eosinophil #: 0.00 K/uL (12-16-20 @ 10:13)  Auto Eosinophil #: 0.00 K/uL (12-15-20 @ 06:00)  Auto Eosinophil #: 0.00 K/uL (12-14-20 @ 12:44)    Lactate Dehydrogenase, Serum: 882 U/L (12-20-20 @ 22:45)  Lactate Dehydrogenase, Serum: 426 U/L (12-16-20 @ 15:40)  Lactate Dehydrogenase, Serum: 265 U/L (12-15-20 @ 09:19)  Lactate Dehydrogenase, Serum: 285 U/L (12-14-20 @ 17:41)    Sedimentation Rate, Erythrocyte: 22 mm/hr (12-20-20 @ 01:24)  Sedimentation Rate, Erythrocyte: 25 mm/hr (12-15-20 @ 06:00)    Procalcitonin, Serum: 14.77 ng/mL (12-20-20 @ 16:43)  Procalcitonin, Serum: 1.54 ng/mL (12-20-20 @ 01:24)  Procalcitonin, Serum: 0.49 ng/mL (12-18-20 @ 07:31)  Procalcitonin, Serum: 0.13 ng/mL (12-15-20 @ 06:00)  Procalcitonin, Serum: 0.13 ng/mL (12-14-20 @ 12:44)    Troponin I, Serum: .015 ng/mL (12-14-20 @ 12:44)    Creatine Kinase, Serum: 298 U/L (12-20-20 @ 09:06)  Creatine Kinase, Serum: 253 U/L (12-15-20 @ 06:00)  Creatine Kinase, Serum: 273 U/L (12-14-20 @ 12:44)        Ferritin, Serum: 67358 ng/mL (12-20-20 @ 22:45)  Ferritin, Serum: 1748 ng/mL (12-18-20 @ 11:03)  Ferritin, Serum: 904 ng/mL (12-16-20 @ 15:28)  Ferritin, Serum: 664 ng/mL (12-15-20 @ 09:17)  Ferritin, Serum: 640 ng/mL (12-14-20 @ 17:41)        INR: 1.08 ratio (12-20-20 @ 01:24)  Activated Partial Thromboplastin Time: 34.2 sec (12-20-20 @ 01:24)  Activated Partial Thromboplastin Time: 30.0 sec (12-14-20 @ 12:44)  INR: 1.06 ratio (12-14-20 @ 12:44)    D-Dimer Assay, Quantitative: 2980 ng/mL DDU (12-20-20 @ 16:43)  D-Dimer Assay, Quantitative: 1198 ng/mL DDU (12-18-20 @ 07:31)  D-Dimer Assay, Quantitative: 1774 ng/mL DDU (12-16-20 @ 10:13)  D-Dimer Assay, Quantitative: 931 ng/mL DDU (12-15-20 @ 06:00)  D-Dimer Assay, Quantitative: 3136 ng/mL DDU (12-14-20 @ 12:44)        MICROBIOLOGY:              RADIOLOGY & ADDITIONAL STUDIES:   [facility] DIVISION of INFECTIOUS DISEASE  Denver Lew MD PhD, Maxine Rodriguez MD, Anahy Syed MD, Jelena Regalado MD  and providing coverage with Katherine Martinez MD and Mala Bray MD  Providing Infectious Disease Consultations at [facility], [facility], [facility]'s      Office# [phone] to schedule follow up appointments  Answering Service for urgent calls or New Consults [phone]  Cell# to text for urgent issues Denver Lew [phone]     Infectious diseases progress note:    SPENCER LEVY is a 88y y. o. Male patient    COVID Patient, not intubated in ICU    Allergies    penicillins (Unknown)  sulfa drugs (Unknown)    Intolerances        ANTIBIOTICS/RELEVANT:  antimicrobials  meropenem  IVPB 1000 milliGRAM(s) IV Intermittent every 12 hours    immunologic:    OTHER:  dexAMETHasone  Injectable 6 milliGRAM(s) IV Push daily  dextrose 40% Gel 15 Gram(s) Oral once  dextrose 5%. 1000 milliLiter(s) IV Continuous <Continuous>  dextrose 5%. 1000 milliLiter(s) IV Continuous <Continuous>  dextrose 50% Injectable 25 Gram(s) IV Push once  dextrose 50% Injectable 12.5 Gram(s) IV Push once  dextrose 50% Injectable 25 Gram(s) IV Push once  glucagon  Injectable 1 milliGRAM(s) IntraMuscular once  insulin lispro (ADMELOG) corrective regimen sliding scale   SubCutaneous every 6 hours  ondansetron Injectable 4 milliGRAM(s) IV Push every 6 hours PRN  phenylephrine    Infusion 0.5 MICROgram(s)/kG/Min IV Continuous <Continuous>  sodium bicarbonate  Infusion 0.195 mEq/kG/Hr IV Continuous <Continuous>      Objective:  Vital Signs Last 24 Hrs  T(C): 35.5 (21 Dec 2020 05:42), Max: 36.6 (21 Dec 2020 00:00)  T(F): 95.9 (21 Dec 2020 05:42), Max: 97.8 (21 Dec 2020 00:00)  HR: 76 (21 Dec 2020 07:59) (55 - 132)  BP: 100/70 (21 Dec 2020 07:00) (65/29 - 129/67)  BP(mean): 81 (21 Dec 2020 07:00) (34 - 91)  RR: 39 (21 Dec 2020 07:00) (34 - 46)  SpO2: 99% (21 Dec 2020 07:59) (79% - 99%)    T(C): 35.5 (12-21-20 @ 05:42), Max: 37.1 (12-20-20 @ 02:35)  T(C): 35.5 (12-21-20 @ 05:42), Max: 37.1 (12-20-20 @ 02:35)  T(C): 35.5 (12-21-20 @ 05:42), Max: 38.1 (12-18-20 @ 05:05)    PHYSICAL EXAM: on Bipap  HEENT: NC atraumatic  Neck: supple  Respiratory: no accessory muscle use, breathing comfortably on vent  Cardiovascular: distant  Gastrointestinal: normal appearing, nondistended  Extremities: no clubbing, no cyanosis,      LABS:                          10.4   9.44  )-----------( 154      ( 21 Dec 2020 05:41 )             28.6       9.44 12-21 @ 05:41  9.32 12-20 @ 13:38  10.06 12-19 @ 07:29  9.34 12-18 @ 09:59  8.33 12-18 @ 07:31  4.87 12-16 @ 10:13  3.31 12-15 @ 06:00  4.14 12-14 @ 12:44      12-21    132<L>  |  83<L>  |  76<H>  ----------------------------<  797<HH>  3.0<L>   |  39<H>  |  3.20<H>    Ca    6.0<LL>      21 Dec 2020 05:42  Phos  4.4     12-21  Mg     2.2     12-21    TPro  3.7<L>  /  Alb  1.3<L>  /  TBili  1.7<H>  /  DBili  x   /  AST  156<H>  /  ALT  82<H>  /  AlkPhos  80  12-21      Creatinine, Serum: 3.20 mg/dL (12-21-20 @ 05:42)  Creatinine, Serum: 3.20 mg/dL (12-20-20 @ 09:06)  Creatinine, Serum: 1.70 mg/dL (12-19-20 @ 07:29)  Creatinine, Serum: 1.40 mg/dL (12-18-20 @ 09:59)  Creatinine, Serum: 1.30 mg/dL (12-16-20 @ 10:13)  Creatinine, Serum: 1.30 mg/dL (12-15-20 @ 06:00)  Creatinine, Serum: 1.40 mg/dL (12-14-20 @ 12:44)      PT/INR - ( 20 Dec 2020 01:24 )   PT: 12.6 sec;   INR: 1.08 ratio         PTT - ( 20 Dec 2020 01:24 )  PTT:34.2 sec          COVID RISK SCORE  Auto Neutrophil #: 8.09 K/uL (12-21-20 @ 05:41)  Auto Lymphocyte #: 0.38 K/uL (12-21-20 @ 05:41)  Auto Neutrophil #: 8.02 K/uL (12-20-20 @ 13:38)  Auto Lymphocyte #: 0.84 K/uL (12-20-20 @ 13:38)  Auto Neutrophil #: 8.92 K/uL (12-19-20 @ 07:29)  Auto Lymphocyte #: 0.65 K/uL (12-19-20 @ 07:29)  Auto Neutrophil #: 7.76 K/uL (12-18-20 @ 07:31)  Auto Lymphocyte #: 0.29 K/uL (12-18-20 @ 07:31)  Auto Neutrophil #: 4.13 K/uL (12-16-20 @ 10:13)  Auto Lymphocyte #: 0.50 K/uL (12-16-20 @ 10:13)  Auto Neutrophil #: 2.56 K/uL (12-15-20 @ 06:00)  Auto Lymphocyte #: 0.57 K/uL (12-15-20 @ 06:00)  Auto Neutrophil #: 3.47 K/uL (12-14-20 @ 12:44)  Auto Lymphocyte #: 0.46 K/uL (12-14-20 @ 12:44)    Lactate, Blood: 10.0 mmol/L (12-20-20 @ 16:43)  Lactate, Blood: 10.7 mmol/L (12-20-20 @ 11:02)  Lactate, Blood: 1.1 mmol/L (12-15-20 @ 06:00)  Lactate, Blood: 2.2 mmol/L (12-14-20 @ 12:44)    Auto Eosinophil #: 0.00 K/uL (12-21-20 @ 05:41)  Auto Eosinophil #: 0.00 K/uL (12-20-20 @ 13:38)  Auto Eosinophil #: 0.00 K/uL (12-19-20 @ 07:29)  Auto Eosinophil #: 0.00 K/uL (12-18-20 @ 07:31)  Auto Eosinophil #: 0.00 K/uL (12-16-20 @ 10:13)  Auto Eosinophil #: 0.00 K/uL (12-15-20 @ 06:00)  Auto Eosinophil #: 0.00 K/uL (12-14-20 @ 12:44)    Lactate Dehydrogenase, Serum: 882 U/L (12-20-20 @ 22:45)  Lactate Dehydrogenase, Serum: 426 U/L (12-16-20 @ 15:40)  Lactate Dehydrogenase, Serum: 265 U/L (12-15-20 @ 09:19)  Lactate Dehydrogenase, Serum: 285 U/L (12-14-20 @ 17:41)    Sedimentation Rate, Erythrocyte: 22 mm/hr (12-20-20 @ 01:24)  Sedimentation Rate, Erythrocyte: 25 mm/hr (12-15-20 @ 06:00)    Procalcitonin, Serum: 14.77 ng/mL (12-20-20 @ 16:43)  Procalcitonin, Serum: 1.54 ng/mL (12-20-20 @ 01:24)  Procalcitonin, Serum: 0.49 ng/mL (12-18-20 @ 07:31)  Procalcitonin, Serum: 0.13 ng/mL (12-15-20 @ 06:00)  Procalcitonin, Serum: 0.13 ng/mL (12-14-20 @ 12:44)    Troponin I, Serum: .015 ng/mL (12-14-20 @ 12:44)    Creatine Kinase, Serum: 298 U/L (12-20-20 @ 09:06)  Creatine Kinase, Serum: 253 U/L (12-15-20 @ 06:00)  Creatine Kinase, Serum: 273 U/L (12-14-20 @ 12:44)        Ferritin, Serum: 21785 ng/mL (12-20-20 @ 22:45)  Ferritin, Serum: 1748 ng/mL (12-18-20 @ 11:03)  Ferritin, Serum: 904 ng/mL (12-16-20 @ 15:28)  Ferritin, Serum: 664 ng/mL (12-15-20 @ 09:17)  Ferritin, Serum: 640 ng/mL (12-14-20 @ 17:41)        INR: 1.08 ratio (12-20-20 @ 01:24)  Activated Partial Thromboplastin Time: 34.2 sec (12-20-20 @ 01:24)  Activated Partial Thromboplastin Time: 30.0 sec (12-14-20 @ 12:44)  INR: 1.06 ratio (12-14-20 @ 12:44)    D-Dimer Assay, Quantitative: 2980 ng/mL DDU (12-20-20 @ 16:43)  D-Dimer Assay, Quantitative: 1198 ng/mL DDU (12-18-20 @ 07:31)  D-Dimer Assay, Quantitative: 1774 ng/mL DDU (12-16-20 @ 10:13)  D-Dimer Assay, Quantitative: 931 ng/mL DDU (12-15-20 @ 06:00)  D-Dimer Assay, Quantitative: 3136 ng/mL DDU (12-14-20 @ 12:44)        MICROBIOLOGY:              RADIOLOGY & ADDITIONAL STUDIES:

## 2020-12-21 NOTE — PROGRESS NOTE ADULT - ASSESSMENT
88M with PMHx HTN, DM, HLD, dementia who presented with weakness and fever. Recently dx'd with COVID.  Admitted to medical service with COVID PNA. Receiving steroids and Remdesivir.     Impression:  1. acute hypoxemic respiratory failure  2. COVID-19 Viral PNA  3. ARDS  4. AF with RVR  5. diabetes/hyperglycemia  6. acute diastolic heart failure      #Neuro   - avoiding all neurosuppressants    #CV   - d/c amio infusion given bradycardia  - start cardizem infusion  -d/c BB given soft BP  -keep K~4 and Mg>2 for optimal arrhythmia suppression  -TSH wnl  -will hold on Echo given his overall prognosis is likely poor  -will hold lovenox in setting of worsening renal function    Pulm  -saturating at 95-97% on bipap  -CXR neg for pneumo  -change steroids to IV  -d/c Remdesivir in setting of britt  -pt remains DNI,               GI   -PPI, NPO for now    Renal   -Cr baseline 1.2-1.4, worsening renal indices, Cr 3.2 today  -lactate 10.7, bicarb 12, start sodium bicarb infusion  -avoid MAP<65, avoid nephrotoxins, strict I/Os, goal even to negative fluid balance, trend Cr    -mueller     Heme   -hold lovenox in setting of britt, continue SCDs     ID   -afebrile, WBC remains normal, BCx NGTD, monitor off abx    Endo   -Aggressive glycemic control to limit FS glucose to < 180mg/dl, change to ISS coverage [id number] with mod dosing, if still remains elevated will start lantus,  -TSH.      COVID 19 specific considerations and therapeutic options based on the available and rapidly changing literature    MOLST in place and remains DNR/DNI.   88M with PMHx HTN, DM, HLD, dementia who presented with weakness and fever. Recently dx'd with COVID.  Admitted to medical service with COVID PNA. Progression of hypoxemia requiring more O2, got a-fib with rvr and given lopressor and amio, had hypotension and tachycardia and RRT was called, pt started on IV amio and HFNC. Now on bipap and cardizem infusion.    #Neuro   - avoiding all neurosuppressants    #CV   - d/c cardizem infusion  - keep K~4 and Mg>2 for optimal arrhythmia suppression  - TSH wnl  - will hold on Echo given his overall prognosis is likely poor  - lovenox held due to worsening renal function. Will check aPTT before starting heparin gtt.    Pulm  - saturating at 95-97% on bipap, will wean to Venti or NC and use bipap at bedtime  - CXR neg for pneumo  - c/w IV steroids  - off Remdesivir in setting of britt  - pt remains DNI             GI   -PPI, NPO for now  - abdominal tenderness this AM, will f/u abdominal xray     Renal   -Cr baseline 1.2-1.4, worsening renal indices, oliguric britt  -lactate 7.7, bicarb 23, c/w bicarb infusion  -avoid MAP<65, avoid nephrotoxins, strict I/Os, goal even to negative fluid balance, trend Cr    -mueller     Heme   -hold lovenox in setting of britt, continue SCDs, will start heparin gtt     ID   -afebrile, WBC remains normal, BCx NGTD  - switch meropenem to zosyn    Endo   -ISS coverage [id number] with mod dosing  -TSH wnl      COVID 19 specific considerations and therapeutic options based on the available and rapidly changing literature    MOLST in place and remains DNR/DNI.

## 2020-12-21 NOTE — PROGRESS NOTE ADULT - SUBJECTIVE AND OBJECTIVE BOX
Patient is a 88y old  Male who presents with a chief complaint of weakness, covid (21 Dec 2020 09:18)       HPI:  87 yo M with HTN DM HLD dementia (walks with cane) who p/w gen weakness x past several days. Patient has not been eating or drinking for the past couple of days. He started having fevers and chills today 101.4. Patient's wife returned home from [facility] on 11/30. The daughter, who lives in the same house, works in a school. They all started to get sick after after wife got home on 11/30.   Pt was recently diagnosed with COVID as mult members in family have the same. Pt sent to hospital as famly can no longer care for this patient at home - pt was seen in ed yesterday for fall -- no acute findings -- and was sent home. Pt may have slid off chair today - no inj. Pt denies complaints. No abd pain. No acute dyspnea. No other acute co.  (14 Dec 2020 16:50)       PAST MEDICAL & SURGICAL HISTORY:  Gout    Hyperlipidemia    Hypertension         FAMILY HISTORY:  NC    Social History:Non smoker    MEDICATIONS  (STANDING):  dexAMETHasone  Injectable 6 milliGRAM(s) IV Push daily  dextrose 40% Gel 15 Gram(s) Oral once  dextrose 5%. 1000 milliLiter(s) (50 mL/Hr) IV Continuous <Continuous>  dextrose 5%. 1000 milliLiter(s) (100 mL/Hr) IV Continuous <Continuous>  dextrose 50% Injectable 25 Gram(s) IV Push once  dextrose 50% Injectable 12.5 Gram(s) IV Push once  dextrose 50% Injectable 25 Gram(s) IV Push once  glucagon  Injectable 1 milliGRAM(s) IntraMuscular once  insulin lispro (ADMELOG) corrective regimen sliding scale   SubCutaneous every 6 hours  meropenem  IVPB 500 milliGRAM(s) IV Intermittent every 12 hours  phenylephrine    Infusion 0.5 MICROgram(s)/kG/Min (14.5 mL/Hr) IV Continuous <Continuous>  sodium bicarbonate  Infusion 0.195 mEq/kG/Hr (100 mL/Hr) IV Continuous <Continuous>    MEDICATIONS  (PRN):  ondansetron Injectable 4 milliGRAM(s) IV Push every 6 hours PRN Nausea and/or Vomiting   Meds reviewed    Allergies    penicillins (Unknown)  sulfa drugs (Unknown)    Intolerances         REVIEW OF SYSTEMS:    Limited due to respiratory status      Vital Signs Last 24 Hrs  T(C): 36.4 (21 Dec 2020 12:00), Max: 36.6 (21 Dec 2020 00:00)  T(F): 97.5 (21 Dec 2020 12:00), Max: 97.8 (21 Dec 2020 00:00)  HR: 97 (21 Dec 2020 12:30) (58 - 132)  BP: 85/54 (21 Dec 2020 12:30) (73/57 - 133/61)  BP(mean): 65 (21 Dec 2020 12:30) (59 - 91)  RR: 32 (21 Dec 2020 12:30) (29 - 46)  SpO2: 92% (21 Dec 2020 12:30) (89% - 100%)  Daily     Daily     PHYSICAL EXAM:    General: NAD  Deferred due to COVID 19 isolation and reduce transmission    LABS:                        10.4   9.44  )-----------( 154      ( 21 Dec 2020 05:41 )             28.6     12-21    136  |  96  |  90<H>  ----------------------------<  182<H>  3.6   |  23  |  3.70<H>    Ca    7.3<L>      21 Dec 2020 10:23  Phos  4.4     12-21  Mg     2.2     12-21    TPro  3.7<L>  /  Alb  1.3<L>  /  TBili  1.7<H>  /  DBili  x   /  AST  156<H>  /  ALT  82<H>  /  AlkPhos  80  12-21    PT/INR - ( 21 Dec 2020 10:23 )   PT: 16.7 sec;   INR: 1.45 ratio         PTT - ( 21 Dec 2020 10:23 )  PTT:40.7 sec    Magnesium, Serum: 2.2 mg/dL (12-21 @ 05:42)  Phosphorus Level, Serum: 4.4 mg/dL (12-21 @ 05:42)          RADIOLOGY & ADDITIONAL TESTS:   Patient is a 88y old  Male who presents with a chief complaint of weakness, covid (21 Dec 2020 09:18)       HPI:  87 yo M with HTN DM HLD dementia (walks with cane) who p/w gen weakness x past several days. Patient has not been eating or drinking for the past couple of days. He started having fevers and chills today 101.4. Patient's wife returned home from [facility] on 11/30. The daughter, who lives in the same house, works in a school. They all started to get sick after after wife got home on 11/30.   Pt was recently diagnosed with COVID as mult members in family have the same. Pt sent to hospital as famly can no longer care for this patient at home - pt was seen in ed yesterday for fall -- no acute findings -- and was sent home. Pt may have slid off chair today - no inj. Pt denies complaints. No abd pain. No acute dyspnea. No other acute co.  (14 Dec 2020 16:50)     No acute overnight events.     PAST MEDICAL & SURGICAL HISTORY:  Gout    Hyperlipidemia    Hypertension         FAMILY HISTORY:  NC    Social History:Non smoker    MEDICATIONS  (STANDING):  dexAMETHasone  Injectable 6 milliGRAM(s) IV Push daily  dextrose 40% Gel 15 Gram(s) Oral once  dextrose 5%. 1000 milliLiter(s) (50 mL/Hr) IV Continuous <Continuous>  dextrose 5%. 1000 milliLiter(s) (100 mL/Hr) IV Continuous <Continuous>  dextrose 50% Injectable 25 Gram(s) IV Push once  dextrose 50% Injectable 12.5 Gram(s) IV Push once  dextrose 50% Injectable 25 Gram(s) IV Push once  glucagon  Injectable 1 milliGRAM(s) IntraMuscular once  insulin lispro (ADMELOG) corrective regimen sliding scale   SubCutaneous every 6 hours  meropenem  IVPB 500 milliGRAM(s) IV Intermittent every 12 hours  phenylephrine    Infusion 0.5 MICROgram(s)/kG/Min (14.5 mL/Hr) IV Continuous <Continuous>  sodium bicarbonate  Infusion 0.195 mEq/kG/Hr (100 mL/Hr) IV Continuous <Continuous>    MEDICATIONS  (PRN):  ondansetron Injectable 4 milliGRAM(s) IV Push every 6 hours PRN Nausea and/or Vomiting   Meds reviewed    Allergies    penicillins (Unknown)  sulfa drugs (Unknown)    Intolerances         REVIEW OF SYSTEMS:    Limited due to respiratory status      Vital Signs Last 24 Hrs  T(C): 36.4 (21 Dec 2020 12:00), Max: 36.6 (21 Dec 2020 00:00)  T(F): 97.5 (21 Dec 2020 12:00), Max: 97.8 (21 Dec 2020 00:00)  HR: 97 (21 Dec 2020 12:30) (58 - 132)  BP: 85/54 (21 Dec 2020 12:30) (73/57 - 133/61)  BP(mean): 65 (21 Dec 2020 12:30) (59 - 91)  RR: 32 (21 Dec 2020 12:30) (29 - 46)  SpO2: 92% (21 Dec 2020 12:30) (89% - 100%)  Daily     Daily     PHYSICAL EXAM:    General: NAD  Deferred due to COVID 19 isolation and reduce transmission    LABS:                        10.4   9.44  )-----------( 154      ( 21 Dec 2020 05:41 )             28.6     12-21    136  |  96  |  90<H>  ----------------------------<  182<H>  3.6   |  23  |  3.70<H>    Ca    7.3<L>      21 Dec 2020 10:23  Phos  4.4     12-21  Mg     2.2     12-21    TPro  3.7<L>  /  Alb  1.3<L>  /  TBili  1.7<H>  /  DBili  x   /  AST  156<H>  /  ALT  82<H>  /  AlkPhos  80  12-21    PT/INR - ( 21 Dec 2020 10:23 )   PT: 16.7 sec;   INR: 1.45 ratio         PTT - ( 21 Dec 2020 10:23 )  PTT:40.7 sec    Magnesium, Serum: 2.2 mg/dL (12-21 @ 05:42)  Phosphorus Level, Serum: 4.4 mg/dL (12-21 @ 05:42)          RADIOLOGY & ADDITIONAL TESTS:

## 2020-12-21 NOTE — PROGRESS NOTE ADULT - SUBJECTIVE AND OBJECTIVE BOX
Patient is a 88y old  Male who presents with a chief complaint of weakness, covid (21 Dec 2020 15:36)      INTERVAL /OVERNIGHT EVENTS: now on high flow    MEDICATIONS  (STANDING):  dexAMETHasone  Injectable 6 milliGRAM(s) IV Push daily  dextrose 40% Gel 15 Gram(s) Oral once  dextrose 5%. 1000 milliLiter(s) (50 mL/Hr) IV Continuous <Continuous>  dextrose 5%. 1000 milliLiter(s) (100 mL/Hr) IV Continuous <Continuous>  dextrose 50% Injectable 25 Gram(s) IV Push once  dextrose 50% Injectable 12.5 Gram(s) IV Push once  dextrose 50% Injectable 25 Gram(s) IV Push once  glucagon  Injectable 1 milliGRAM(s) IntraMuscular once  heparin  Infusion.  Unit(s)/Hr (14 mL/Hr) IV Continuous <Continuous>  insulin lispro (ADMELOG) corrective regimen sliding scale   SubCutaneous every 6 hours  meropenem  IVPB 500 milliGRAM(s) IV Intermittent every 12 hours  phenylephrine    Infusion 0.5 MICROgram(s)/kG/Min (14.5 mL/Hr) IV Continuous <Continuous>  sodium bicarbonate  Infusion 0.195 mEq/kG/Hr (100 mL/Hr) IV Continuous <Continuous>    MEDICATIONS  (PRN):  heparin   Injectable 6500 Unit(s) IV Push every 6 hours PRN For aPTT less than 40  heparin   Injectable 3000 Unit(s) IV Push every 6 hours PRN For aPTT between 40 - 57  ondansetron Injectable 4 milliGRAM(s) IV Push every 6 hours PRN Nausea and/or Vomiting      Allergies    penicillins (Unknown)  sulfa drugs (Unknown)    Intolerances        REVIEW OF SYSTEMS:  denies    Vital Signs Last 24 Hrs  T(C): 36.8 (21 Dec 2020 16:00), Max: 36.8 (21 Dec 2020 16:00)  T(F): 98.3 (21 Dec 2020 16:00), Max: 98.3 (21 Dec 2020 16:00)  HR: 84 (21 Dec 2020 17:00) (68 - 132)  BP: 95/52 (21 Dec 2020 17:00) (82/53 - 133/61)  BP(mean): 67 (21 Dec 2020 17:00) (60 - 91)  RR: 38 (21 Dec 2020 17:00) (3 - 44)  SpO2: 90% (21 Dec 2020 17:26) (87% - 100%)    PHYSICAL EXAM:  GENERAL: NAD, well-groomed, well-developed  HEAD:  Atraumatic, Normocephalic  EYES: EOMI, PERRLA, conjunctiva and sclera clear  ENMT: No tonsillar erythema, exudates, or enlargement; Moist mucous membranes, Good dentition, No lesions  NECK: Supple, No JVD, Normal thyroid  NERVOUS SYSTEM:  Alert & awake; Motor Strength 5/5 B/L upper and lower extremities; DTRs 2+ intact and symmetric  CHEST/LUNG: Clear to auscultation bilaterally; No rales, rhonchi, wheezing, or rubs  HEART: Regular rate and rhythm; No murmurs, rubs, or gallops  ABDOMEN: Soft, Nontender, Nondistended; Bowel sounds present  EXTREMITIES:  2+ Peripheral Pulses, No clubbing, cyanosis, or edema  LYMPH: No lymphadenopathy noted  SKIN: No rashes or lesions    LABS:                        10.4   9.44  )-----------( 154      ( 21 Dec 2020 05:41 )             28.6     21 Dec 2020 10:23    136    |  96     |  90     ----------------------------<  182    3.6     |  23     |  3.70     Ca    7.3        21 Dec 2020 10:23  Phos  4.4       21 Dec 2020 05:42  Mg     2.2       21 Dec 2020 05:42    TPro  3.7    /  Alb  1.3    /  TBili  1.7    /  DBili  x      /  AST  156    /  ALT  82     /  AlkPhos  80     21 Dec 2020 05:42    PT/INR - ( 21 Dec 2020 10:23 )   PT: 16.7 sec;   INR: 1.45 ratio         PTT - ( 21 Dec 2020 10:23 )  PTT:40.7 sec  Urinalysis Basic - ( 21 Dec 2020 16:13 )    Color: Yellow / Appearance: Slightly Turbid / S.010 / pH: x  Gluc: x / Ketone: Negative  / Bili: Negative / Urobili: Negative   Blood: x / Protein: Negative / Nitrite: Negative   Leuk Esterase: Negative / RBC: 6-10 /HPF / WBC 3-5   Sq Epi: x / Non Sq Epi: Occasional / Bacteria: Occasional      CAPILLARY BLOOD GLUCOSE      POCT Blood Glucose.: 215 mg/dL (21 Dec 2020 18:21)  POCT Blood Glucose.: 213 mg/dL (21 Dec 2020 12:26)  POCT Blood Glucose.: 217 mg/dL (21 Dec 2020 05:59)  POCT Blood Glucose.: 167 mg/dL (21 Dec 2020 00:30)      RADIOLOGY & ADDITIONAL TESTS:    Notes Reviewed:  [x ] YES  [ ] NO    Care Discussed with Consultants/Other Providers [ x] YES  [ ] NO

## 2020-12-22 DIAGNOSIS — I48.91 UNSPECIFIED ATRIAL FIBRILLATION: ICD-10-CM

## 2020-12-22 LAB
ALBUMIN SERPL ELPH-MCNC: 1.6 G/DL — LOW (ref 3.3–5)
ALBUMIN SERPL ELPH-MCNC: 1.6 G/DL — LOW (ref 3.3–5)
ALP SERPL-CCNC: 121 U/L — HIGH (ref 40–120)
ALP SERPL-CCNC: 132 U/L — HIGH (ref 40–120)
ALT FLD-CCNC: 67 U/L — SIGNIFICANT CHANGE UP (ref 12–78)
ALT FLD-CCNC: 68 U/L — SIGNIFICANT CHANGE UP (ref 12–78)
ANION GAP SERPL CALC-SCNC: 12 MMOL/L — SIGNIFICANT CHANGE UP (ref 5–17)
APTT BLD: 199.9 SEC — CRITICAL HIGH (ref 27.5–35.5)
APTT BLD: > 200 SEC (ref 27.5–35.5)
APTT BLD: >200 SEC — CRITICAL HIGH (ref 27.5–35.5)
AST SERPL-CCNC: 80 U/L — HIGH (ref 15–37)
AST SERPL-CCNC: 81 U/L — HIGH (ref 15–37)
BASOPHILS # BLD AUTO: 0.05 K/UL — SIGNIFICANT CHANGE UP (ref 0–0.2)
BASOPHILS NFR BLD AUTO: 0.4 % — SIGNIFICANT CHANGE UP (ref 0–2)
BILIRUB DIRECT SERPL-MCNC: 1.8 MG/DL — HIGH (ref 0.05–0.2)
BILIRUB INDIRECT FLD-MCNC: 0.6 MG/DL — SIGNIFICANT CHANGE UP (ref 0.2–1)
BILIRUB SERPL-MCNC: 2.3 MG/DL — HIGH (ref 0.2–1.2)
BILIRUB SERPL-MCNC: 2.4 MG/DL — HIGH (ref 0.2–1.2)
BUN SERPL-MCNC: 92 MG/DL — HIGH (ref 7–23)
CALCIUM SERPL-MCNC: 7.2 MG/DL — LOW (ref 8.5–10.1)
CHLORIDE SERPL-SCNC: 97 MMOL/L — SIGNIFICANT CHANGE UP (ref 96–108)
CO2 SERPL-SCNC: 30 MMOL/L — SIGNIFICANT CHANGE UP (ref 22–31)
CREAT ?TM UR-MCNC: 33 MG/DL — SIGNIFICANT CHANGE UP
CREAT SERPL-MCNC: 3.4 MG/DL — HIGH (ref 0.5–1.3)
CULTURE RESULTS: SIGNIFICANT CHANGE UP
CULTURE RESULTS: SIGNIFICANT CHANGE UP
EOSINOPHIL # BLD AUTO: 0 K/UL — SIGNIFICANT CHANGE UP (ref 0–0.5)
EOSINOPHIL NFR BLD AUTO: 0 % — SIGNIFICANT CHANGE UP (ref 0–6)
GLUCOSE SERPL-MCNC: 163 MG/DL — HIGH (ref 70–99)
HCT VFR BLD CALC: 33.6 % — LOW (ref 39–50)
HCT VFR BLD CALC: 36.8 % — LOW (ref 39–50)
HGB BLD-MCNC: 12.1 G/DL — LOW (ref 13–17)
HGB BLD-MCNC: 13.3 G/DL — SIGNIFICANT CHANGE UP (ref 13–17)
IMM GRANULOCYTES NFR BLD AUTO: 2.6 % — HIGH (ref 0–1.5)
LACTATE SERPL-SCNC: 4 MMOL/L — CRITICAL HIGH (ref 0.7–2)
LYMPHOCYTES # BLD AUTO: 0.51 K/UL — LOW (ref 1–3.3)
LYMPHOCYTES # BLD AUTO: 4.3 % — LOW (ref 13–44)
MAGNESIUM SERPL-MCNC: 2.6 MG/DL — SIGNIFICANT CHANGE UP (ref 1.6–2.6)
MCHC RBC-ENTMCNC: 30.3 PG — SIGNIFICANT CHANGE UP (ref 27–34)
MCHC RBC-ENTMCNC: 30.4 PG — SIGNIFICANT CHANGE UP (ref 27–34)
MCHC RBC-ENTMCNC: 36 GM/DL — SIGNIFICANT CHANGE UP (ref 32–36)
MCHC RBC-ENTMCNC: 36.1 GM/DL — HIGH (ref 32–36)
MCV RBC AUTO: 84 FL — SIGNIFICANT CHANGE UP (ref 80–100)
MCV RBC AUTO: 84.2 FL — SIGNIFICANT CHANGE UP (ref 80–100)
MONOCYTES # BLD AUTO: 0.17 K/UL — SIGNIFICANT CHANGE UP (ref 0–0.9)
MONOCYTES NFR BLD AUTO: 1.4 % — LOW (ref 2–14)
NEUTROPHILS # BLD AUTO: 10.79 K/UL — HIGH (ref 1.8–7.4)
NEUTROPHILS NFR BLD AUTO: 91.3 % — HIGH (ref 43–77)
NRBC # BLD: 0 /100 WBCS — SIGNIFICANT CHANGE UP (ref 0–0)
NRBC # BLD: 0 /100 WBCS — SIGNIFICANT CHANGE UP (ref 0–0)
PHOSPHATE SERPL-MCNC: 4.4 MG/DL — SIGNIFICANT CHANGE UP (ref 2.5–4.5)
PLATELET # BLD AUTO: 147 K/UL — LOW (ref 150–400)
PLATELET # BLD AUTO: 171 K/UL — SIGNIFICANT CHANGE UP (ref 150–400)
POTASSIUM SERPL-MCNC: 3.2 MMOL/L — LOW (ref 3.5–5.3)
POTASSIUM SERPL-SCNC: 3.2 MMOL/L — LOW (ref 3.5–5.3)
PROT SERPL-MCNC: 4.9 G/DL — LOW (ref 6–8.3)
PROT SERPL-MCNC: 4.9 G/DL — LOW (ref 6–8.3)
RBC # BLD: 3.99 M/UL — LOW (ref 4.2–5.8)
RBC # BLD: 4.38 M/UL — SIGNIFICANT CHANGE UP (ref 4.2–5.8)
RBC # FLD: 12.8 % — SIGNIFICANT CHANGE UP (ref 10.3–14.5)
RBC # FLD: 13.1 % — SIGNIFICANT CHANGE UP (ref 10.3–14.5)
SODIUM SERPL-SCNC: 139 MMOL/L — SIGNIFICANT CHANGE UP (ref 135–145)
SODIUM UR-SCNC: 81 MMOL/L — SIGNIFICANT CHANGE UP
SPECIMEN SOURCE: SIGNIFICANT CHANGE UP
SPECIMEN SOURCE: SIGNIFICANT CHANGE UP
UUN UR-MCNC: 306 MG/DL — SIGNIFICANT CHANGE UP
WBC # BLD: 11.83 K/UL — HIGH (ref 3.8–10.5)
WBC # BLD: 11.93 K/UL — HIGH (ref 3.8–10.5)
WBC # FLD AUTO: 11.83 K/UL — HIGH (ref 3.8–10.5)
WBC # FLD AUTO: 11.93 K/UL — HIGH (ref 3.8–10.5)

## 2020-12-22 PROCEDURE — 93010 ELECTROCARDIOGRAM REPORT: CPT

## 2020-12-22 PROCEDURE — 99291 CRITICAL CARE FIRST HOUR: CPT | Mod: CS

## 2020-12-22 PROCEDURE — 99291 CRITICAL CARE FIRST HOUR: CPT

## 2020-12-22 RX ORDER — AMIODARONE HYDROCHLORIDE 400 MG/1
150 TABLET ORAL ONCE
Refills: 0 | Status: COMPLETED | OUTPATIENT
Start: 2020-12-22 | End: 2020-12-22

## 2020-12-22 RX ORDER — POTASSIUM CHLORIDE 20 MEQ
10 PACKET (EA) ORAL
Refills: 0 | Status: COMPLETED | OUTPATIENT
Start: 2020-12-22 | End: 2020-12-22

## 2020-12-22 RX ORDER — MIDODRINE HYDROCHLORIDE 2.5 MG/1
10 TABLET ORAL EVERY 8 HOURS
Refills: 0 | Status: DISCONTINUED | OUTPATIENT
Start: 2020-12-22 | End: 2020-12-26

## 2020-12-22 RX ORDER — FUROSEMIDE 40 MG
40 TABLET ORAL ONCE
Refills: 0 | Status: COMPLETED | OUTPATIENT
Start: 2020-12-22 | End: 2020-12-22

## 2020-12-22 RX ADMIN — MEROPENEM 100 MILLIGRAM(S): 1 INJECTION INTRAVENOUS at 05:23

## 2020-12-22 RX ADMIN — Medication 100 MILLIEQUIVALENT(S): at 08:28

## 2020-12-22 RX ADMIN — AMIODARONE HYDROCHLORIDE 206 MILLIGRAM(S): 400 TABLET ORAL at 08:28

## 2020-12-22 RX ADMIN — Medication 2: at 05:41

## 2020-12-22 RX ADMIN — Medication 40 MILLIGRAM(S): at 20:00

## 2020-12-22 RX ADMIN — PHENYLEPHRINE HYDROCHLORIDE 14.5 MICROGRAM(S)/KG/MIN: 10 INJECTION INTRAVENOUS at 02:38

## 2020-12-22 RX ADMIN — Medication 4: at 17:16

## 2020-12-22 RX ADMIN — SODIUM CHLORIDE 100 MILLILITER(S): 9 INJECTION, SOLUTION INTRAVENOUS at 05:23

## 2020-12-22 RX ADMIN — HEPARIN SODIUM 0 UNIT(S)/HR: 5000 INJECTION INTRAVENOUS; SUBCUTANEOUS at 20:45

## 2020-12-22 RX ADMIN — HEPARIN SODIUM 0 UNIT(S)/HR: 5000 INJECTION INTRAVENOUS; SUBCUTANEOUS at 11:12

## 2020-12-22 RX ADMIN — HEPARIN SODIUM 1100 UNIT(S)/HR: 5000 INJECTION INTRAVENOUS; SUBCUTANEOUS at 02:38

## 2020-12-22 RX ADMIN — Medication 6 MILLIGRAM(S): at 05:22

## 2020-12-22 RX ADMIN — MEROPENEM 100 MILLIGRAM(S): 1 INJECTION INTRAVENOUS at 17:16

## 2020-12-22 RX ADMIN — Medication 100 MILLIEQUIVALENT(S): at 09:58

## 2020-12-22 RX ADMIN — HEPARIN SODIUM 800 UNIT(S)/HR: 5000 INJECTION INTRAVENOUS; SUBCUTANEOUS at 12:21

## 2020-12-22 RX ADMIN — Medication 2: at 11:30

## 2020-12-22 RX ADMIN — HEPARIN SODIUM 500 UNIT(S)/HR: 5000 INJECTION INTRAVENOUS; SUBCUTANEOUS at 21:56

## 2020-12-22 RX ADMIN — HEPARIN SODIUM 0 UNIT(S)/HR: 5000 INJECTION INTRAVENOUS; SUBCUTANEOUS at 01:30

## 2020-12-22 RX ADMIN — Medication 100 MILLIEQUIVALENT(S): at 11:12

## 2020-12-22 NOTE — PROGRESS NOTE ADULT - ASSESSMENT
88M with PMHx HTN, DM, HLD, dementia who presented to the hospital with weakness and fever. Recently dx'd with COVID.  Admitted to medical service with COVID PNA. Receiving steroids and Remdesivir.   Now with AF RVR, transferred to ICU for hypotension and tachycardiac    AF:  - rates currently controlled   - has gotten pushes of amio, and has been on dilt, now off  - given hypotension would consider prn loading dose of dig.   -AC    HF:  - unknown LV function  - defer diuresis at this point given hypotension and pressor requirements   - monitor renal function and electrolytes  - check TTE    - stricts I&O's, daily weights    COVID  - BiPAP  - on remdesivir and decadron  - pulm/ICU input appreciated    AZALEA  - Cr worsening.   - strict I/Os, goal even to negative fluid balance,   - trend Cr      - Monitor and replete lytes, keep K>4 and Mg >2  - Further cardiac workup will depend on clinical course.   - All other workup per primary team

## 2020-12-22 NOTE — PROGRESS NOTE ADULT - SUBJECTIVE AND OBJECTIVE BOX
Patient is a 88y old  Male who presents with a chief complaint of weakness, covid (18 Dec 2020 10:34)      Subjective: Patient seen    PAIN: N  DYSPNEA: N	  NAUS/VOM: 	N  SECRETIONS: 	N  AGITATION: N    OTHER REVIEW OF SYSTEMS: negative      GENERAL:  on high flow  HEENT:  NC/AT   NECK: supple no JVD  CVS:  +S1 S2 RRR  RESP: decreased bs  GI:  soft NT/ND +BS  : no suprapubic tenderness  MUSC:  no lower extremity edema  SKIN:  Warm, moist, no rashes   LYMPH: normal     MEDS REVIEWED	            ADVANCED DIRECTIVES:         DNR     DNI    MOLST    PSYCHOSOCIAL-SPIRITUAL ASSESSMENT:    _x__Reviewed     x___Care  plan unchanged     ___Care plan adjusted as below    GOALS OF CARE DISCUSSION  	x___Palliative care info/counseling provided	    ___Family meeting  	_x__Advanced Directives addressed	    _x__See previous Palliative Medicine Note    AGENCY CHOICE DISCUSSED:   ___HOSPICE   ___[facility]  ___OTHER:              > 50% OF THE TIME SPENT IN COUNSELING AND COORDINATING CARE 	    Minutes:      PROLONGED SERVICE             FACE TO FACE:    PT            PT & FAMILY	       Minutes:      Advance Care Planning Time:

## 2020-12-22 NOTE — PROGRESS NOTE ADULT - SUBJECTIVE AND OBJECTIVE BOX
Patient is a 88y old  Male who presents with a chief complaint of weakness, covid (21 Dec 2020 09:18)       HPI:  87 yo M with HTN DM HLD dementia (walks with cane) who p/w gen weakness x past several days. Patient has not been eating or drinking for the past couple of days. He started having fevers and chills today 101.4. Patient's wife returned home from [facility] on . The daughter, who lives in the same house, works in a school. They all started to get sick after after wife got home on .   Pt was recently diagnosed with COVID as mult members in family have the same. Pt sent to hospital as famly can no longer care for this patient at home - pt was seen in ed yesterday for fall -- no acute findings -- and was sent home. Pt may have slid off chair today - no inj. Pt denies complaints. No abd pain. No acute dyspnea. No other acute co.  (14 Dec 2020 16:50)     No acute overnight events.     PAST MEDICAL & SURGICAL HISTORY:  Gout    Hyperlipidemia    Hypertension         FAMILY HISTORY:  NC    Social History:Non smoker    MEDICATIONS  (STANDING):  dexAMETHasone  Injectable 6 milliGRAM(s) IV Push daily  dextrose 40% Gel 15 Gram(s) Oral once  dextrose 5%. 1000 milliLiter(s) (50 mL/Hr) IV Continuous <Continuous>  dextrose 5%. 1000 milliLiter(s) (100 mL/Hr) IV Continuous <Continuous>  dextrose 50% Injectable 25 Gram(s) IV Push once  dextrose 50% Injectable 12.5 Gram(s) IV Push once  dextrose 50% Injectable 25 Gram(s) IV Push once  glucagon  Injectable 1 milliGRAM(s) IntraMuscular once  insulin lispro (ADMELOG) corrective regimen sliding scale   SubCutaneous every 6 hours  meropenem  IVPB 500 milliGRAM(s) IV Intermittent every 12 hours  phenylephrine    Infusion 0.5 MICROgram(s)/kG/Min (14.5 mL/Hr) IV Continuous <Continuous>  sodium bicarbonate  Infusion 0.195 mEq/kG/Hr (100 mL/Hr) IV Continuous <Continuous>    MEDICATIONS  (PRN):  ondansetron Injectable 4 milliGRAM(s) IV Push every 6 hours PRN Nausea and/or Vomiting   Meds reviewed    Allergies    penicillins (Unknown)  sulfa drugs (Unknown)    Intolerances         REVIEW OF SYSTEMS:    Limited due to respiratory status      Vital Signs Last 24 Hrs  T(C): 36.4 (21 Dec 2020 12:00), Max: 36.6 (21 Dec 2020 00:00)  T(F): 97.5 (21 Dec 2020 12:00), Max: 97.8 (21 Dec 2020 00:00)  HR: 97 (21 Dec 2020 12:30) (58 - 132)  BP: 85/54 (21 Dec 2020 12:30) (73/57 - 133/61)  BP(mean): 65 (21 Dec 2020 12:30) (59 - 91)  RR: 32 (21 Dec 2020 12:30) (29 - 46)  SpO2: 92% (21 Dec 2020 12:30) (89% - 100%)  Daily     Daily     PHYSICAL EXAM:    General: NAD  Deferred due to COVID 19 isolation and reduce transmission    LABS:                         12.1   11.83 )-----------( 147      ( 22 Dec 2020 05:51 )             33.6     12    139  |  97  |  92<H>  ----------------------------<  163<H>  3.2<L>   |  30  |  3.40<H>    Ca    7.2<L>      22 Dec 2020 05:51  Phos  4.4       Mg     2.6         TPro  4.9<L>  /  Alb  1.6<L>  /  TBili  2.3<H>  /  DBili  1.80<H>  /  AST  81<H>  /  ALT  67  /  AlkPhos  121<H>      PT/INR - ( 21 Dec 2020 10:23 )   PT: 16.7 sec;   INR: 1.45 ratio         PTT - ( 22 Dec 2020 09:26 )  PTT:>200.0 sec  Urinalysis Basic - ( 21 Dec 2020 16:13 )    Color: Yellow / Appearance: Slightly Turbid / S.010 / pH: x  Gluc: x / Ketone: Negative  / Bili: Negative / Urobili: Negative   Blood: x / Protein: Negative / Nitrite: Negative   Leuk Esterase: Negative / RBC: 6-10 /HPF / WBC 3-5   Sq Epi: x / Non Sq Epi: Occasional / Bacteria: Occasional      Magnesium, Serum: 2.6 mg/dL ( @ 05:51)  Phosphorus Level, Serum: 4.4 mg/dL ( @ 05:51)          RADIOLOGY & ADDITIONAL TESTS:  RADIOLOGY & ADDITIONAL TESTS:

## 2020-12-22 NOTE — PROGRESS NOTE ADULT - SUBJECTIVE AND OBJECTIVE BOX
Date/Time Patient Seen:  		  Referring MD:   Data Reviewed	       Patient is a 88y old  Male who presents with a chief complaint of weakness, covid (21 Dec 2020 15:36)      Subjective/HPI     PAST MEDICAL & SURGICAL HISTORY:  Gout    Hyperlipidemia    Hypertension          Medication list         MEDICATIONS  (STANDING):  dexAMETHasone  Injectable 6 milliGRAM(s) IV Push daily  dextrose 40% Gel 15 Gram(s) Oral once  dextrose 5%. 1000 milliLiter(s) (50 mL/Hr) IV Continuous <Continuous>  dextrose 5%. 1000 milliLiter(s) (100 mL/Hr) IV Continuous <Continuous>  dextrose 50% Injectable 25 Gram(s) IV Push once  dextrose 50% Injectable 12.5 Gram(s) IV Push once  dextrose 50% Injectable 25 Gram(s) IV Push once  glucagon  Injectable 1 milliGRAM(s) IntraMuscular once  heparin  Infusion.  Unit(s)/Hr (14 mL/Hr) IV Continuous <Continuous>  insulin lispro (ADMELOG) corrective regimen sliding scale   SubCutaneous every 6 hours  lactated ringers. 1000 milliLiter(s) (100 mL/Hr) IV Continuous <Continuous>  meropenem  IVPB 500 milliGRAM(s) IV Intermittent every 12 hours  phenylephrine    Infusion 0.5 MICROgram(s)/kG/Min (14.5 mL/Hr) IV Continuous <Continuous>  potassium chloride  10 mEq/100 mL IVPB 10 milliEquivalent(s) IV Intermittent every 1 hour    MEDICATIONS  (PRN):  heparin   Injectable 6500 Unit(s) IV Push every 6 hours PRN For aPTT less than 40  heparin   Injectable 3000 Unit(s) IV Push every 6 hours PRN For aPTT between 40 - 57  ondansetron Injectable 4 milliGRAM(s) IV Push every 6 hours PRN Nausea and/or Vomiting         Vitals log        ICU Vital Signs Last 24 Hrs  T(C): 36.6 (22 Dec 2020 05:00), Max: 36.8 (21 Dec 2020 16:00)  T(F): 97.9 (22 Dec 2020 05:00), Max: 98.3 (21 Dec 2020 16:00)  HR: 95 (22 Dec 2020 07:00) (76 - 114)  BP: 96/78 (22 Dec 2020 07:00) (77/53 - 140/56)  BP(mean): 83 (22 Dec 2020 07:00) (60 - 114)  ABP: --  ABP(mean): --  RR: 34 (22 Dec 2020 07:00) (3 - 40)  SpO2: 100% (22 Dec 2020 07:00) (76% - 100%)           Input and Output:  I&O's Detail    21 Dec 2020 07:01  -  22 Dec 2020 07:00  --------------------------------------------------------  IN:    Heparin Infusion: 164 mL    IV PiggyBack: 150 mL    Lactated Ringers: 1200 mL    Oral Fluid: 25 mL    Phenylephrine: 730.8 mL    Sodium Bicarbonate: 300 mL    Sodium Bicarbonate: 900 mL  Total IN: 3469.8 mL    OUT:    Indwelling Catheter - Urethral (mL): 1650 mL  Total OUT: 1650 mL    Total NET: 1819.8 mL          Lab Data                        12.1   11.83 )-----------( 147      ( 22 Dec 2020 05:51 )             33.6     12-22    139  |  97  |  92<H>  ----------------------------<  163<H>  3.2<L>   |  30  |  3.40<H>    Ca    7.2<L>      22 Dec 2020 05:51  Phos  4.4     12-22  Mg     2.6     12-22    TPro  4.9<L>  /  Alb  1.6<L>  /  TBili  2.3<H>  /  DBili  1.80<H>  /  AST  81<H>  /  ALT  67  /  AlkPhos  121<H>  12-22      CARDIAC MARKERS ( 20 Dec 2020 09:06 )  x     / x     / 298 U/L / x     / x            Review of Systems	      Objective     Physical Examination    heart s1s2  lung dec BS  abd soft      Pertinent Lab findings & Imaging      Tariq:  NO   Adequate UO     I&O's Detail    21 Dec 2020 07:01  -  22 Dec 2020 07:00  --------------------------------------------------------  IN:    Heparin Infusion: 164 mL    IV PiggyBack: 150 mL    Lactated Ringers: 1200 mL    Oral Fluid: 25 mL    Phenylephrine: 730.8 mL    Sodium Bicarbonate: 300 mL    Sodium Bicarbonate: 900 mL  Total IN: 3469.8 mL    OUT:    Indwelling Catheter - Urethral (mL): 1650 mL  Total OUT: 1650 mL    Total NET: 1819.8 mL               Discussed with:     Cultures:	        Radiology

## 2020-12-22 NOTE — PROGRESS NOTE ADULT - SUBJECTIVE AND OBJECTIVE BOX
[facility] DIVISION of INFECTIOUS DISEASE  Denver Lew MD PhD, Maxine Rodriguez MD, Anahy Syed MD, Jelena Regalado MD  and providing coverage with Katherine Martinez MD and Mala Bray MD  Providing Infectious Disease Consultations at [facility], [facility], [facility]'s      Office# [phone] to schedule follow up appointments  Answering Service for urgent calls or New Consults [phone]  Cell# to text for urgent issues Denver Lew [phone]     Infectious diseases progress note:    SPENCER LEVY is a 88y y. o. Male patient    COVID Patient    Allergies    penicillins (Unknown)  sulfa drugs (Unknown)    Intolerances      ANTIBIOTICS/RELEVANT:  antimicrobials  meropenem  IVPB 500 milliGRAM(s) IV Intermittent every 12 hours    immunologic:    OTHER:  dexAMETHasone  Injectable 6 milliGRAM(s) IV Push daily  dextrose 40% Gel 15 Gram(s) Oral once  dextrose 5%. 1000 milliLiter(s) IV Continuous <Continuous>  dextrose 5%. 1000 milliLiter(s) IV Continuous <Continuous>  dextrose 50% Injectable 25 Gram(s) IV Push once  dextrose 50% Injectable 12.5 Gram(s) IV Push once  dextrose 50% Injectable 25 Gram(s) IV Push once  glucagon  Injectable 1 milliGRAM(s) IntraMuscular once  heparin   Injectable 6500 Unit(s) IV Push every 6 hours PRN  heparin   Injectable 3000 Unit(s) IV Push every 6 hours PRN  heparin  Infusion.  Unit(s)/Hr IV Continuous <Continuous>  insulin lispro (ADMELOG) corrective regimen sliding scale   SubCutaneous every 6 hours  lactated ringers. 1000 milliLiter(s) IV Continuous <Continuous>  midodrine 10 milliGRAM(s) Oral every 8 hours  ondansetron Injectable 4 milliGRAM(s) IV Push every 6 hours PRN  phenylephrine    Infusion 0.5 MICROgram(s)/kG/Min IV Continuous <Continuous>  potassium chloride  10 mEq/100 mL IVPB 10 milliEquivalent(s) IV Intermittent every 1 hour      Objective:  Vital Signs Last 24 Hrs  T(C): 35.3 (22 Dec 2020 07:39), Max: 36.8 (21 Dec 2020 16:00)  T(F): 95.5 (22 Dec 2020 07:39), Max: 98.3 (21 Dec 2020 16:00)  HR: 82 (22 Dec 2020 09:00) (76 - 148)  BP: 92/58 (22 Dec 2020 09:00) (77/53 - 140/56)  BP(mean): 71 (22 Dec 2020 09:00) (60 - 114)  RR: 33 (22 Dec 2020 09:00) (3 - 40)  SpO2: 97% (22 Dec 2020 09:00) (76% - 100%)    T(C): 35.3 (20 @ 07:39), Max: 36.8 (20 @ 16:00)  T(C): 35.3 (20 @ 07:39), Max: 37.1 (20 @ 02:35)  T(C): 35.3 (20 @ 07:39), Max: 37.1 (20 @ 02:35)    PHYSICAL EXAM: on Bipap  HEENT: NC atraumatic  Neck: supple  Respiratory: no accessory muscle use, breathing comfortably on Bipap  Cardiovascular: distant  Gastrointestinal: normal appearing, nondistended  Extremities: no clubbing, no cyanosis,      LABS:                          12.1   11.83 )-----------( 147      ( 22 Dec 2020 05:51 )             33.6       11.83  @ 05:51  11.93  @ 00:39  9.44  @ 05:41  9.32  @ 13:38  10.06 19 @ 07:29  9.34  @ 09:59  8.33  @ 07:31  4.87 -16 @ 10:13          139  |  97  |  92<H>  ----------------------------<  163<H>  3.2<L>   |  30  |  3.40<H>    Ca    7.2<L>      22 Dec 2020 05:51  Phos  4.4       Mg     2.6         TPro  4.9<L>  /  Alb  1.6<L>  /  TBili  2.3<H>  /  DBili  1.80<H>  /  AST  81<H>  /  ALT  67  /  AlkPhos  121<H>        Creatinine, Serum: 3.40 mg/dL (20 @ 05:51)  Creatinine, Serum: 3.70 mg/dL (20 @ 10:23)  Creatinine, Serum: 3.20 mg/dL (20 @ 05:42)  Creatinine, Serum: 3.20 mg/dL (20 @ 09:06)  Creatinine, Serum: 1.70 mg/dL (20 @ 07:29)  Creatinine, Serum: 1.40 mg/dL (20 @ 09:59)  Creatinine, Serum: 1.30 mg/dL (20 @ 10:13)      PT/INR - ( 21 Dec 2020 10:23 )   PT: 16.7 sec;   INR: 1.45 ratio         PTT - ( 22 Dec 2020 00:39 )  PTT:> 200 sec  Urinalysis Basic - ( 21 Dec 2020 16:13 )    Color: Yellow / Appearance: Slightly Turbid / S.010 / pH: x  Gluc: x / Ketone: Negative  / Bili: Negative / Urobili: Negative   Blood: x / Protein: Negative / Nitrite: Negative   Leuk Esterase: Negative / RBC: 6-10 /HPF / WBC 3-5   Sq Epi: x / Non Sq Epi: Occasional / Bacteria: Occasional            COVID RISK SCORE  Auto Neutrophil #: 10.79 K/uL (20 @ 05:51)  Auto Lymphocyte #: 0.51 K/uL (20 @ 05:51)  Auto Neutrophil #: 8.09 K/uL (20 @ 05:41)  Auto Lymphocyte #: 0.38 K/uL (20 @ 05:41)  Auto Neutrophil #: 8.02 K/uL (20 @ 13:38)  Auto Lymphocyte #: 0.84 K/uL (20 @ 13:38)  Auto Neutrophil #: 8.92 K/uL (20 @ 07:29)  Auto Lymphocyte #: 0.65 K/uL (20 @ 07:29)  Auto Neutrophil #: 7.76 K/uL (20 @ 07:31)  Auto Lymphocyte #: 0.29 K/uL (20 @ 07:31)  Auto Neutrophil #: 4.13 K/uL (20 @ 10:13)  Auto Lymphocyte #: 0.50 K/uL (20 @ 10:13)  Auto Neutrophil #: 2.56 K/uL (12-15-20 @ 06:00)  Auto Lymphocyte #: 0.57 K/uL (12-15-20 @ 06:00)  Auto Neutrophil #: 3.47 K/uL (20 @ 12:44)  Auto Lymphocyte #: 0.46 K/uL (20 @ 12:44)    Lactate, Blood: 7.2 mmol/L (20 @ 10:31)  Lactate, Blood: 10.0 mmol/L (20 @ 16:43)  Lactate, Blood: 10.7 mmol/L (20 @ 11:02)  Lactate, Blood: 1.1 mmol/L (12-15-20 @ 06:00)  Lactate, Blood: 2.2 mmol/L (20 @ 12:44)    Auto Eosinophil #: 0.00 K/uL (20 @ 05:51)  Auto Eosinophil #: 0.00 K/uL (20 @ 05:41)  Auto Eosinophil #: 0.00 K/uL (20 @ 13:38)  Auto Eosinophil #: 0.00 K/uL (20 @ 07:29)  Auto Eosinophil #: 0.00 K/uL (20 @ 07:31)  Auto Eosinophil #: 0.00 K/uL (20 @ 10:13)  Auto Eosinophil #: 0.00 K/uL (12-15-20 @ 06:00)  Auto Eosinophil #: 0.00 K/uL (20 @ 12:44)    Lactate Dehydrogenase, Serum: 882 U/L (20 @ 22:45)  Lactate Dehydrogenase, Serum: 426 U/L (20 @ 15:40)  Lactate Dehydrogenase, Serum: 265 U/L (12-15-20 @ 09:19)  Lactate Dehydrogenase, Serum: 285 U/L (20 @ 17:41)    Sedimentation Rate, Erythrocyte: 22 mm/hr (20 @ 01:24)  Sedimentation Rate, Erythrocyte: 25 mm/hr (12-15-20 @ 06:00)    Procalcitonin, Serum: 14.77 ng/mL (20 @ 16:43)  Procalcitonin, Serum: 1.54 ng/mL (20 @ 01:24)  Procalcitonin, Serum: 0.49 ng/mL (20 @ 07:31)  Procalcitonin, Serum: 0.13 ng/mL (12-15-20 @ 06:00)  Procalcitonin, Serum: 0.13 ng/mL (20 @ 12:44)    Troponin I, Serum: .015 ng/mL (20 @ 12:44)    Creatine Kinase, Serum: 298 U/L (20 @ 09:06)  Creatine Kinase, Serum: 253 U/L (12-15-20 @ 06:00)  Creatine Kinase, Serum: 273 U/L (20 @ 12:44)        Ferritin, Serum: 18938 ng/mL (20 @ 22:45)  Ferritin, Serum: 4391 ng/mL (20 @ 04:56)  Ferritin, Serum: 1748 ng/mL (20 @ 11:03)  Ferritin, Serum: 904 ng/mL (20 @ 15:28)  Ferritin, Serum: 664 ng/mL (12-15-20 @ 09:17)  Ferritin, Serum: 640 ng/mL (20 @ 17:41)        Activated Partial Thromboplastin Time: > 200 sec (20 @ 00:39)  INR: 1.45 ratio (20 @ 10:23)  Activated Partial Thromboplastin Time: 40.7 sec (20 @ 10:23)  INR: 1.08 ratio (20 @ 01:24)  Activated Partial Thromboplastin Time: 34.2 sec (20 @ 01:24)  Activated Partial Thromboplastin Time: 30.0 sec (20 @ 12:44)  INR: 1.06 ratio (20 @ 12:44)    D-Dimer Assay, Quantitative: 2980 ng/mL DDU (20 @ 16:43)  D-Dimer Assay, Quantitative: 1198 ng/mL DDU (20 @ 07:31)  D-Dimer Assay, Quantitative: 1774 ng/mL DDU (20 @ 10:13)  D-Dimer Assay, Quantitative: 931 ng/mL DDU (12-15-20 @ 06:00)  D-Dimer Assay, Quantitative: 3136 ng/mL DDU (20 @ 12:44)        MICROBIOLOGY:              RADIOLOGY & ADDITIONAL STUDIES:

## 2020-12-22 NOTE — PROGRESS NOTE ADULT - ASSESSMENT
88M with PMHx HTN, DM, HLD, dementia who presented with weakness and fever. Recently dx'd with COVID.  Admitted to medical service with COVID PNA. Progression of hypoxemia requiring more O2, got a-fib with rvr and given lopressor and amio, had hypotension and tachycardia and RRT was called, pt started on IV amio and HFNC. Now on bipap and cardizem infusion.    #Neuro   - avoiding all neurosuppressants    #CV   - d/c cardizem infusion  - keep K~4 and Mg>2 for optimal arrhythmia suppression  - TSH wnl  - will hold on Echo given his overall prognosis is likely poor  - lovenox held due to worsening renal function. Will check aPTT before starting heparin gtt.    Pulm  - saturating at 95-97% on bipap, will wean to Venti or NC and use bipap at bedtime  - CXR neg for pneumo  - c/w IV steroids  - off Remdesivir in setting of britt  - pt remains DNI             GI   -PPI, NPO for now  - abdominal tenderness this AM, will f/u abdominal xray     Renal   -Cr baseline 1.2-1.4, worsening renal indices, oliguric britt  -lactate 7.7, bicarb 23, c/w bicarb infusion  -avoid MAP<65, avoid nephrotoxins, strict I/Os, goal even to negative fluid balance, trend Cr    -mueller     Heme   -hold lovenox in setting of britt, continue SCDs, will start heparin gtt     ID   -afebrile, WBC remains normal, BCx NGTD  - switch meropenem to zosyn    Endo   -ISS coverage [id number] with mod dosing  -TSH wnl      COVID 19 specific considerations and therapeutic options based on the available and rapidly changing literature    MOLST in place and remains DNR/DNI.   88M with PMHx HTN, DM, HLD, dementia who presented with weakness and fever. Recently dx'd with COVID.  Admitted to medical service with COVID PNA. Progression of hypoxemia requiring more O2, got a-fib with rvr and given lopressor and amio, had hypotension and tachycardia and RRT was called, pt started on IV amio and HFNC. Now alternating between bipap and HFNC.    #Neuro   - avoiding all neurosuppressants    #CV   - pt with episode of atrial fibrillation with rvr this AM, started on IV amio  - keep K~4 and Mg>2 for optimal arrhythmia suppression  - TSH wnl  - will hold on Echo given his overall prognosis is likely poor  - c/w heparin gtt    Pulm  - on HFNC with use of bipap at night  - c/w IV steroids  - off Remdesivir in setting of britt  - pt remains DNI             GI   - will start dysphagia 1 diet with nectar thick liquids and aspiration precations  - c/w PPI  - abdominal xray negative for free air. Pt continues to have abdominal tenderness. Pt is having bowel movements. Will continue to monitor.    Renal   -Cr baseline 1.2-1.4, renal indices mildly improved today   -discontinued bicarb infusion  -avoid MAP<65, avoid nephrotoxins, strict I/Os, goal even to negative fluid balance, trend Cr    -mueller     Heme   -c/w heparin gtt    ID   - BCx NGTD  - leukocytosis and hypothermic this AM  - c/w meropenem  - off remdesivir due to britt  - c/w steroids    Endo   -ISS coverage [id number] with mod dosing  -TSH wnl      COVID 19 specific considerations and therapeutic options based on the available and rapidly changing literature    MOLST in place and remains DNR/DNI.

## 2020-12-22 NOTE — PROVIDER CONTACT NOTE (EICU) - ACTION/TREATMENT ORDERED:
Bedside team requesting chest xray to confirm position of NGT. Order placed as requested, to be followed up by the bedside team.

## 2020-12-22 NOTE — PROGRESS NOTE ADULT - SUBJECTIVE AND OBJECTIVE BOX
Patient is a 88y old  Male who presents with a chief complaint of weakness, covid (22 Dec 2020 14:20)      INTERVAL /OVERNIGHT EVENTS: still on high flow    MEDICATIONS  (STANDING):  dexAMETHasone  Injectable 6 milliGRAM(s) IV Push daily  dextrose 40% Gel 15 Gram(s) Oral once  dextrose 5%. 1000 milliLiter(s) (50 mL/Hr) IV Continuous <Continuous>  dextrose 5%. 1000 milliLiter(s) (100 mL/Hr) IV Continuous <Continuous>  dextrose 50% Injectable 25 Gram(s) IV Push once  dextrose 50% Injectable 12.5 Gram(s) IV Push once  dextrose 50% Injectable 25 Gram(s) IV Push once  furosemide   Injectable 40 milliGRAM(s) IV Push once  glucagon  Injectable 1 milliGRAM(s) IntraMuscular once  heparin  Infusion.  Unit(s)/Hr (14 mL/Hr) IV Continuous <Continuous>  insulin lispro (ADMELOG) corrective regimen sliding scale   SubCutaneous every 6 hours  lactated ringers. 1000 milliLiter(s) (50 mL/Hr) IV Continuous <Continuous>  meropenem  IVPB 500 milliGRAM(s) IV Intermittent every 12 hours  midodrine 10 milliGRAM(s) Oral every 8 hours  phenylephrine    Infusion 0.5 MICROgram(s)/kG/Min (14.5 mL/Hr) IV Continuous <Continuous>    MEDICATIONS  (PRN):  heparin   Injectable 6500 Unit(s) IV Push every 6 hours PRN For aPTT less than 40  heparin   Injectable 3000 Unit(s) IV Push every 6 hours PRN For aPTT between 40 - 57  ondansetron Injectable 4 milliGRAM(s) IV Push every 6 hours PRN Nausea and/or Vomiting      Allergies    penicillins (Unknown)  sulfa drugs (Unknown)    Intolerances        REVIEW OF SYSTEMS:  unable to obtain    Vital Signs Last 24 Hrs  T(C): 36.8 (22 Dec 2020 18:00), Max: 36.8 (22 Dec 2020 18:00)  T(F): 98.2 (22 Dec 2020 18:00), Max: 98.2 (22 Dec 2020 18:00)  HR: 101 (22 Dec 2020 18:30) (78 - 148)  BP: 133/64 (22 Dec 2020 18:00) (76/52 - 140/56)  BP(mean): 71 (22 Dec 2020 18:00) (58 - 114)  RR: 34 (22 Dec 2020 18:30) (29 - 42)  SpO2: 89% (22 Dec 2020 18:30) (76% - 100%)    PHYSICAL EXAM:  GENERAL: NAD, well-groomed, well-developed  HEAD:  Atraumatic, Normocephalic  EYES: EOMI, PERRLA, conjunctiva and sclera clear  ENMT: No tonsillar erythema, exudates, or enlargement; Moist mucous membranes, Good dentition, No lesions  NECK: Supple, No JVD, Normal thyroid  NERVOUS SYSTEM:  confused  CHEST/LUNG: Clear to auscultation bilaterally; No rales, rhonchi, wheezing, or rubs  HEART: Regular rate and rhythm; No murmurs, rubs, or gallops  ABDOMEN: Soft, Nontender, Nondistended; Bowel sounds present  EXTREMITIES:  2+ Peripheral Pulses, No clubbing, cyanosis, or edema  LYMPH: No lymphadenopathy noted  SKIN: No rashes or lesions    LABS:                        12.1   11.83 )-----------( 147      ( 22 Dec 2020 05:51 )             33.6     22 Dec 2020 05:51    139    |  97     |  92     ----------------------------<  163    3.2     |  30     |  3.40     Ca    7.2        22 Dec 2020 05:51  Phos  4.4       22 Dec 2020 05:51  Mg     2.6       22 Dec 2020 05:51    TPro  4.9    /  Alb  1.6    /  TBili  2.3    /  DBili  1.80   /  AST  81     /  ALT  67     /  AlkPhos  121    22 Dec 2020 05:51    PT/INR - ( 21 Dec 2020 10:23 )   PT: 16.7 sec;   INR: 1.45 ratio         PTT - ( 22 Dec 2020 09:26 )  PTT:>200.0 sec  Urinalysis Basic - ( 21 Dec 2020 16:13 )    Color: Yellow / Appearance: Slightly Turbid / S.010 / pH: x  Gluc: x / Ketone: Negative  / Bili: Negative / Urobili: Negative   Blood: x / Protein: Negative / Nitrite: Negative   Leuk Esterase: Negative / RBC: 6-10 /HPF / WBC 3-5   Sq Epi: x / Non Sq Epi: Occasional / Bacteria: Occasional      CAPILLARY BLOOD GLUCOSE      POCT Blood Glucose.: 201 mg/dL (22 Dec 2020 17:13)  POCT Blood Glucose.: 187 mg/dL (22 Dec 2020 11:28)  POCT Blood Glucose.: 157 mg/dL (22 Dec 2020 05:36)  POCT Blood Glucose.: 218 mg/dL (21 Dec 2020 23:12)      RADIOLOGY & ADDITIONAL TESTS:    Notes Reviewed:  [x ] YES  [ ] NO    Care Discussed with Consultants/Other Providers [x ] YES  [ ] NO

## 2020-12-22 NOTE — PROGRESS NOTE ADULT - ASSESSMENT
Patient is a 87 yo M with HTN DM HLD dementia (walks with cane) who p/w gen weakness x past several days. fever, recently diagnosed with COVID sent to hospital as family can no longer care for this patient at home - 12/14 first COVID+ PCR    RECOMMENDATIONS  12/14 NLR 3.47/0.46=7.5 97% on RA, would NOT start steroid as pt sats fine, rec LMWH prophylactic dose, with no hypoxemia and unclear duration rec NO remdesivir  blood with what appears to be a contaminant, urine with <100k enterococcus  12/15 agree with stopping steroids, continue LMWH   12/16 NLR 4/0.5=8 blood cultures with what appear to be contaminant no further Rx, on RA, continued fevers  12/17 now on NC-3.5L some concerns for secondary process will send off further testing, STEROIDs started  12/18 NC-3L, meeting criteria so REMDESIVIR started  12/19 NLR 8.92/0.65=13.7, NC 4L, continue remdesivir, steroids, lovenox, monitor CBC with diff, inflammatory markers  12/20 noted to be hypothermic today with rapid afiba and worsening CXR, transferred to ICU, placed on BIPAP. Lactic acid ~10, LFTs now elevated with AST >5x ULN and Cr 3.2 with GFR <30 - stopped remdesivir. Send for inflammatory markers and CBC with diff - will follow to evaluate for possible Tocilizumab. Send blood cultures. Start empirically on MEROPENEM . FPR 13521/14.83=9377, bandemia 19%, worsening CXR  12/21 NLR 8.09/0.38=21.3, prior enterococcus in urine on 12/14, rec sending sputum, urine, follow blood cultures, suspect secondary bacterial infection, send fungitell, aspergillus galactamanan  blood cultures NGTD  12/22 NLR 10.79/0.5=21 concern for secondary bacterial infection, send fungitell, aspergillus galactamanan rec change to ZOSYN

## 2020-12-22 NOTE — PROGRESS NOTE ADULT - ASSESSMENT
AZALEA on CKD stage 3  COVID 19+  Metabolic Acidosis    -BLCR 1.4, Creatinine uptrending  -AZALEA 2/2 hypoxemic injury  -Urine lytes pending  -UA 30 protein mod blood  -IVF as ordered  -Renal indices with mild improvement  -Monitor urine output  -Strict &Os  -Pressors per ICU  -Patient DNR/DNI  -No acute indication for dialysis; poor candidate given age and comorbidities    D/W Dr. Hayes      d/w ICU

## 2020-12-22 NOTE — PROGRESS NOTE ADULT - SUBJECTIVE AND OBJECTIVE BOX
[facility]    Alethea Christian, Mary Anne, Santana, Rolly, Fabricio, Duy      [phone]    CHIEF COMPLAINT: Patient is a 88y old  Male who presents with a chief complaint of weakness, covid (22 Dec 2020 09:37)      Follow Up: af, sepsis, resp failure    Interim history: on bipap and lethargic, unable to provide a hx. events noted    MEDICATIONS  (STANDING):  dexAMETHasone  Injectable 6 milliGRAM(s) IV Push daily  dextrose 40% Gel 15 Gram(s) Oral once  dextrose 5%. 1000 milliLiter(s) (50 mL/Hr) IV Continuous <Continuous>  dextrose 5%. 1000 milliLiter(s) (100 mL/Hr) IV Continuous <Continuous>  dextrose 50% Injectable 25 Gram(s) IV Push once  dextrose 50% Injectable 12.5 Gram(s) IV Push once  dextrose 50% Injectable 25 Gram(s) IV Push once  glucagon  Injectable 1 milliGRAM(s) IntraMuscular once  heparin  Infusion.  Unit(s)/Hr (14 mL/Hr) IV Continuous <Continuous>  insulin lispro (ADMELOG) corrective regimen sliding scale   SubCutaneous every 6 hours  lactated ringers. 1000 milliLiter(s) (100 mL/Hr) IV Continuous <Continuous>  meropenem  IVPB 500 milliGRAM(s) IV Intermittent every 12 hours  midodrine 10 milliGRAM(s) Oral every 8 hours  phenylephrine    Infusion 0.5 MICROgram(s)/kG/Min (14.5 mL/Hr) IV Continuous <Continuous>    MEDICATIONS  (PRN):  heparin   Injectable 6500 Unit(s) IV Push every 6 hours PRN For aPTT less than 40  heparin   Injectable 3000 Unit(s) IV Push every 6 hours PRN For aPTT between 40 - 57  ondansetron Injectable 4 milliGRAM(s) IV Push every 6 hours PRN Nausea and/or Vomiting      REVIEW OF SYSTEMS: unable (lethargic)    Vital Signs Last 24 Hrs  T(C): 35.4 (22 Dec 2020 10:00), Max: 36.8 (21 Dec 2020 16:00)  T(F): 95.7 (22 Dec 2020 10:00), Max: 98.3 (21 Dec 2020 16:00)  HR: 103 (22 Dec 2020 12:00) (78 - 148)  BP: 104/59 (22 Dec 2020 12:00) (77/53 - 140/56)  BP(mean): 76 (22 Dec 2020 12:00) (60 - 114)  RR: 38 (22 Dec 2020 12:00) (3 - 40)  SpO2: 94% (22 Dec 2020 12:00) (76% - 100%)    I&O's Summary    21 Dec 2020 07:01  -  22 Dec 2020 07:00  --------------------------------------------------------  IN: 3469.8 mL / OUT: 1650 mL / NET: 1819.8 mL    22 Dec 2020 07:01  -  22 Dec 2020 12:26  --------------------------------------------------------  IN: 832 mL / OUT: 410 mL / NET: 422 mL        Telemetry past 24h: af controlled    PHYSICAL EXAM:    Constitutional: well-nourished, well-developed, NAD   HEENT:  MMM, sclerae anicteric, conjunctivae clear, no oral cyanosis.  Pulmonary: Non-labored, breath sounds are clear bilaterally, No wheezing, rales or rhonchi  Cardiovascular: distant, irregular, S1 and S2.  No murmur.  No rubs, gallops or clicks  Gastrointestinal: Bowel Sounds present, soft, nontender.   Lymph: No peripheral edema.   Neurological: leth  Skin: No rashes.  Psych:  leth    LABS: All Labs Reviewed:                        12.1   11.83 )-----------( 147      ( 22 Dec 2020 05:51 )             33.6                         13.3   11.93 )-----------( 171      ( 22 Dec 2020 00:39 )             36.8                         10.4   9.44  )-----------( 154      ( 21 Dec 2020 05:41 )             28.6     22 Dec 2020 05:51    139    |  97     |  92     ----------------------------<  163    3.2     |  30     |  3.40   21 Dec 2020 10:23    136    |  96     |  90     ----------------------------<  182    3.6     |  23     |  3.70   21 Dec 2020 05:42    132    |  83     |  76     ----------------------------<  797    3.0     |  39     |  3.20     Ca    7.2        22 Dec 2020 05:51  Ca    7.3        21 Dec 2020 10:23  Ca    6.0        21 Dec 2020 05:42  Phos  4.4       22 Dec 2020 05:51  Phos  4.4       21 Dec 2020 05:42  Mg     2.6       22 Dec 2020 05:51  Mg     2.2       21 Dec 2020 05:42    TPro  4.9    /  Alb  1.6    /  TBili  2.3    /  DBili  1.80   /  AST  81     /  ALT  67     /  AlkPhos  121    22 Dec 2020 05:51  TPro  3.7    /  Alb  1.3    /  TBili  1.7    /  DBili  x      /  AST  156    /  ALT  82     /  AlkPhos  80     21 Dec 2020 05:42  TPro  3.9    /  Alb  1.2    /  TBili  1.8    /  DBili  1.40   /  AST  159    /  ALT  86     /  AlkPhos  84     21 Dec 2020 05:41    PT/INR - ( 21 Dec 2020 10:23 )   PT: 16.7 sec;   INR: 1.45 ratio         PTT - ( 22 Dec 2020 09:26 )  PTT:>200.0 sec      Blood Culture: Organism --  Gram Stain Blood -- Gram Stain --  Specimen Source .Blood Blood  Culture-Blood --    Organism --  Gram Stain Blood -- Gram Stain --  Specimen Source .Blood Blood-Peripheral  Culture-Blood --        12-20 @ 09:06  TSH: 0.86      RADIOLOGY:    EKG:    Echo:

## 2020-12-22 NOTE — PROGRESS NOTE ADULT - ASSESSMENT
2.17.2020 This is an 88 M with dementia comes with COVID PNA. Patient requires help with ADLs. He lives with daughter and wife. His daughter is his HCP. Daughter reports that both her parents have advanced directives that state that they are a DNR/DNI. She would like to complete MOLST. MOLST was reviewed, witnessed and signed. DNR/DNI order entered into EMR.    12.18.2020 Patient still febrile and requiring oxygen. Poor po intake. On remdisivir and decadron.    12.19.2020 Afebrile x 24 hours. Continues to require oxygen.    12.20.2020 Transferred to ICU with acute on chronic hypoxic respiratory failure secondary to COVID PNA. Patient currently on BIPAP. Overall prognosis poor. Daughter aware. Patient remains a DNR/DNI.    12.21.2020 Cr worsening. Not candidate for HD. Off BIPAP. Tolerating high-flow. Overall prognosis poor.    12.22.2020 Tolerating high flow. On iv abx for possible superimposed infection. Prognosis poor.

## 2020-12-22 NOTE — PROGRESS NOTE ADULT - SUBJECTIVE AND OBJECTIVE BOX
Patient is a 88y old  Male who presents with a chief complaint of weakness, covid (22 Dec 2020 07:24)    24 hour events: ***    REVIEW OF SYSTEMS  Constitutional: No fever, chills, fatigue  Neuro: No headache, numbness, weakness  Resp: No cough, wheezing, shortness of breath  CVS: No chest pain, palpitations, leg swelling  GI: No abdominal pain, nausea, vomiting, diarrhea   : No dysuria, frequency, incontinence  Skin: No itching, burning, rashes, or lesions   Msk: No joint pain or swelling  Psych: No depression, anxiety, mood swings  Heme: No bleeding    T(F): 97.9 (20 @ 05:00), Max: 98.3 (20 @ 16:00)  HR: 95 (20 @ 07:00) (76 - 114)  BP: 96/78 (20 @ 07:00) (77/53 - 140/56)  RR: 34 (20 @ 07:00) (3 - 40)  SpO2: 100% (20 @ 07:00) (76% - 100%)  Wt(kg): --            I&O's Summary     @ 07:01  -   @ 07:00  --------------------------------------------------------  IN: 3469.8 mL / OUT: 1650 mL / NET: 1819.8 mL      PHYSICAL EXAM  General:   CNS:   HEENT:   Resp:   CVS:   Abd:   Ext:   Skin:     MEDICATIONS  meropenem  IVPB IV Intermittent    phenylephrine    Infusion IV Continuous    dexAMETHasone  Injectable IV Push  dextrose 40% Gel Oral  dextrose 50% Injectable IV Push  dextrose 50% Injectable IV Push  dextrose 50% Injectable IV Push  glucagon  Injectable IntraMuscular  insulin lispro (ADMELOG) corrective regimen sliding scale SubCutaneous      ondansetron Injectable IV Push PRN      heparin   Injectable IV Push PRN  heparin   Injectable IV Push PRN  heparin  Infusion. IV Continuous        dextrose 5%. IV Continuous  dextrose 5%. IV Continuous  lactated ringers. IV Continuous  potassium chloride  10 mEq/100 mL IVPB IV Intermittent                                12.1   11.83 )-----------( 147      ( 22 Dec 2020 05:51 )             33.6           139  |  97  |  92<H>  ----------------------------<  163<H>  3.2<L>   |  30  |  3.40<H>    Ca    7.2<L>      22 Dec 2020 05:51  Phos  4.4       Mg     2.6         TPro  4.9<L>  /  Alb  1.6<L>  /  TBili  2.3<H>  /  DBili  1.80<H>  /  AST  81<H>  /  ALT  67  /  AlkPhos  121<H>      Lactate 7.2            @ 10:31      CARDIAC MARKERS ( 20 Dec 2020 09:06 )  x     / x     / 298 U/L / x     / x          PT/INR - ( 21 Dec 2020 10:23 )   PT: 16.7 sec;   INR: 1.45 ratio         PTT - ( 22 Dec 2020 00:39 )  PTT:> 200 sec  Urinalysis Basic - ( 21 Dec 2020 16:13 )    Color: Yellow / Appearance: Slightly Turbid / S.010 / pH: x  Gluc: x / Ketone: Negative  / Bili: Negative / Urobili: Negative   Blood: x / Protein: Negative / Nitrite: Negative   Leuk Esterase: Negative / RBC: 6-10 /HPF / WBC 3-5   Sq Epi: x / Non Sq Epi: Occasional / Bacteria: Occasional      .Blood Blood   No growth to date. --  @ 00:30  .Blood Blood-Peripheral   No growth to date. --  @ 18:06        Radiology: ***  Bedside lung ultrasound: ***  Bedside ECHO: ***    CENTRAL LINE: Y/N          DATE INSERTED:              REMOVE: Y/N  MADRIGAL: Y/N                        DATE INSERTED:              REMOVE: Y/N  A-LINE: Y/N                       DATE INSERTED:              REMOVE: Y/N    GLOBAL ISSUE/BEST PRACTICE  Analgesia:   Sedation:   CAM-ICU:   HOB elevation: yes  Stress ulcer prophylaxis:   VTE prophylaxis:   Glycemic control:   Nutrition:     CODE STATUS: ***  [facility] discussion: Y       Patient is a 88y old  Male who presents with a chief complaint of weakness, covid (22 Dec 2020 07:24)    24 hour events: Had episode of atrial fibrillation with RVR this AM, started on IV amio.     REVIEW OF SYSTEMS: Unable to assess due to clinical condition.     T(F): 97.9 (20 @ 05:00), Max: 98.3 (20 @ 16:00)  HR: 95 (20 @ 07:00) (76 - 114)  BP: 96/78 (20 @ 07:00) (77/53 - 140/56)  RR: 34 (20 @ 07:00) (3 - 40)  SpO2: 100% (20 @ 07:00) (76% - 100%)  Wt(kg): --      I&O's Summary     07:01  -   @ 07:00  --------------------------------------------------------  IN: 3469.8 mL / OUT: 1650 mL / NET: 1819.8 mL      PHYSICAL EXAM  GENERAL: on HFNC, NAD  NEURO: lethargic but minimally responds to questions, withdraws to pain in all 4 extremities, pupils symmetric  LUNGS: clear to auscultation, no intercostal retractions  HEART: S1/S2, regular rate and rhythm, no murmurs noted  GASTROINTESTINAL: +tenderness; soft, nondistended  INTEGUMENT: good skin turgor, warm skin, appears well perfused  EXTREMITIES: no edema, cyanosis, or clubbing     MEDICATIONS  meropenem  IVPB IV Intermittent    phenylephrine    Infusion IV Continuous    dexAMETHasone  Injectable IV Push  dextrose 40% Gel Oral  dextrose 50% Injectable IV Push  dextrose 50% Injectable IV Push  dextrose 50% Injectable IV Push  glucagon  Injectable IntraMuscular  insulin lispro (ADMELOG) corrective regimen sliding scale SubCutaneous      ondansetron Injectable IV Push PRN      heparin   Injectable IV Push PRN  heparin   Injectable IV Push PRN  heparin  Infusion. IV Continuous        dextrose 5%. IV Continuous  dextrose 5%. IV Continuous  lactated ringers. IV Continuous  potassium chloride  10 mEq/100 mL IVPB IV Intermittent                                12.1   11.83 )-----------( 147      ( 22 Dec 2020 05:51 )             33.6           139  |  97  |  92<H>  ----------------------------<  163<H>  3.2<L>   |  30  |  3.40<H>    Ca    7.2<L>      22 Dec 2020 05:51  Phos  4.4       Mg     2.6         TPro  4.9<L>  /  Alb  1.6<L>  /  TBili  2.3<H>  /  DBili  1.80<H>  /  AST  81<H>  /  ALT  67  /  AlkPhos  121<H>      Lactate 7.2            @ 10:31      CARDIAC MARKERS ( 20 Dec 2020 09:06 )  x     / x     / 298 U/L / x     / x          PT/INR - ( 21 Dec 2020 10:23 )   PT: 16.7 sec;   INR: 1.45 ratio         PTT - ( 22 Dec 2020 00:39 )  PTT:> 200 sec  Urinalysis Basic - ( 21 Dec 2020 16:13 )    Color: Yellow / Appearance: Slightly Turbid / S.010 / pH: x  Gluc: x / Ketone: Negative  / Bili: Negative / Urobili: Negative   Blood: x / Protein: Negative / Nitrite: Negative   Leuk Esterase: Negative / RBC: 6-10 /HPF / WBC 3-5   Sq Epi: x / Non Sq Epi: Occasional / Bacteria: Occasional      .Blood Blood   No growth to date. --  @ 00:30  .Blood Blood-Peripheral   No growth to date. --  @ 18:06        Radiology:   < from: Xray Abdomen 1 View PORTABLE -Urgent (Xray Abdomen 1 View PORTABLE -Urgent .) (20 @ 14:23) >  IMPRESSION:  No evidence of free air.    < end of copied text >        CENTRAL LINE: LORRAINE MADRIGAL: JULIA                          DATE INSERTED: 20  A-LINE: N                           GLOBAL ISSUE/BEST PRACTICE  Analgesia: N  Sedation: N  HOB elevation: yes  Stress ulcer prophylaxis:   VTE prophylaxis: Y  Glycemic control: Y  Nutrition: NPO    CODE STATUS: DNR/DNI  GOC discussion: JULIA

## 2020-12-23 LAB
ALBUMIN SERPL ELPH-MCNC: 1.6 G/DL — LOW (ref 3.3–5)
ALBUMIN SERPL ELPH-MCNC: 1.6 G/DL — LOW (ref 3.3–5)
ALP SERPL-CCNC: 134 U/L — HIGH (ref 40–120)
ALP SERPL-CCNC: 135 U/L — HIGH (ref 40–120)
ALT FLD-CCNC: 52 U/L — SIGNIFICANT CHANGE UP (ref 12–78)
ALT FLD-CCNC: 54 U/L — SIGNIFICANT CHANGE UP (ref 12–78)
ANION GAP SERPL CALC-SCNC: 10 MMOL/L — SIGNIFICANT CHANGE UP (ref 5–17)
APTT BLD: 66.1 SEC — HIGH (ref 27.5–35.5)
APTT BLD: 85.7 SEC — HIGH (ref 27.5–35.5)
AST SERPL-CCNC: 56 U/L — HIGH (ref 15–37)
AST SERPL-CCNC: 56 U/L — HIGH (ref 15–37)
BASOPHILS # BLD AUTO: 0.07 K/UL — SIGNIFICANT CHANGE UP (ref 0–0.2)
BASOPHILS NFR BLD AUTO: 0.5 % — SIGNIFICANT CHANGE UP (ref 0–2)
BILIRUB DIRECT SERPL-MCNC: 1.5 MG/DL — HIGH (ref 0.05–0.2)
BILIRUB INDIRECT FLD-MCNC: 0.6 MG/DL — SIGNIFICANT CHANGE UP (ref 0.2–1)
BILIRUB SERPL-MCNC: 2.1 MG/DL — HIGH (ref 0.2–1.2)
BILIRUB SERPL-MCNC: 2.1 MG/DL — HIGH (ref 0.2–1.2)
BUN SERPL-MCNC: 95 MG/DL — HIGH (ref 7–23)
CALCIUM SERPL-MCNC: 7.5 MG/DL — LOW (ref 8.5–10.1)
CHLORIDE SERPL-SCNC: 102 MMOL/L — SIGNIFICANT CHANGE UP (ref 96–108)
CO2 SERPL-SCNC: 30 MMOL/L — SIGNIFICANT CHANGE UP (ref 22–31)
CREAT SERPL-MCNC: 3.4 MG/DL — HIGH (ref 0.5–1.3)
EOSINOPHIL # BLD AUTO: 0 K/UL — SIGNIFICANT CHANGE UP (ref 0–0.5)
EOSINOPHIL NFR BLD AUTO: 0 % — SIGNIFICANT CHANGE UP (ref 0–6)
GLUCOSE SERPL-MCNC: 201 MG/DL — HIGH (ref 70–99)
HCT VFR BLD CALC: 35.5 % — LOW (ref 39–50)
HGB BLD-MCNC: 12.5 G/DL — LOW (ref 13–17)
IMM GRANULOCYTES NFR BLD AUTO: 0.7 % — SIGNIFICANT CHANGE UP (ref 0–1.5)
LACTATE SERPL-SCNC: 4.8 MMOL/L — CRITICAL HIGH (ref 0.7–2)
LYMPHOCYTES # BLD AUTO: 0.6 K/UL — LOW (ref 1–3.3)
LYMPHOCYTES # BLD AUTO: 4.2 % — LOW (ref 13–44)
MAGNESIUM SERPL-MCNC: 2.8 MG/DL — HIGH (ref 1.6–2.6)
MCHC RBC-ENTMCNC: 30 PG — SIGNIFICANT CHANGE UP (ref 27–34)
MCHC RBC-ENTMCNC: 35.2 GM/DL — SIGNIFICANT CHANGE UP (ref 32–36)
MCV RBC AUTO: 85.3 FL — SIGNIFICANT CHANGE UP (ref 80–100)
MONOCYTES # BLD AUTO: 0.37 K/UL — SIGNIFICANT CHANGE UP (ref 0–0.9)
MONOCYTES NFR BLD AUTO: 2.6 % — SIGNIFICANT CHANGE UP (ref 2–14)
NEUTROPHILS # BLD AUTO: 13.22 K/UL — HIGH (ref 1.8–7.4)
NEUTROPHILS NFR BLD AUTO: 92 % — HIGH (ref 43–77)
NRBC # BLD: 0 /100 WBCS — SIGNIFICANT CHANGE UP (ref 0–0)
PHOSPHATE SERPL-MCNC: 4.7 MG/DL — HIGH (ref 2.5–4.5)
PLATELET # BLD AUTO: 137 K/UL — LOW (ref 150–400)
POTASSIUM SERPL-MCNC: 4.4 MMOL/L — SIGNIFICANT CHANGE UP (ref 3.5–5.3)
POTASSIUM SERPL-SCNC: 4.4 MMOL/L — SIGNIFICANT CHANGE UP (ref 3.5–5.3)
PROT SERPL-MCNC: 4.7 G/DL — LOW (ref 6–8.3)
PROT SERPL-MCNC: 5.3 G/DL — LOW (ref 6–8.3)
RBC # BLD: 4.16 M/UL — LOW (ref 4.2–5.8)
RBC # FLD: 13.3 % — SIGNIFICANT CHANGE UP (ref 10.3–14.5)
SODIUM SERPL-SCNC: 142 MMOL/L — SIGNIFICANT CHANGE UP (ref 135–145)
WBC # BLD: 14.36 K/UL — HIGH (ref 3.8–10.5)
WBC # FLD AUTO: 14.36 K/UL — HIGH (ref 3.8–10.5)

## 2020-12-23 PROCEDURE — 99291 CRITICAL CARE FIRST HOUR: CPT

## 2020-12-23 PROCEDURE — 74176 CT ABD & PELVIS W/O CONTRAST: CPT | Mod: 26

## 2020-12-23 PROCEDURE — 71250 CT THORAX DX C-: CPT | Mod: 26

## 2020-12-23 PROCEDURE — 71045 X-RAY EXAM CHEST 1 VIEW: CPT | Mod: 26

## 2020-12-23 PROCEDURE — 99291 CRITICAL CARE FIRST HOUR: CPT | Mod: CS

## 2020-12-23 RX ORDER — IOHEXOL 300 MG/ML
500 INJECTION, SOLUTION INTRAVENOUS
Refills: 0 | Status: COMPLETED | OUTPATIENT
Start: 2020-12-23 | End: 2020-12-23

## 2020-12-23 RX ORDER — DEXMEDETOMIDINE HYDROCHLORIDE IN 0.9% SODIUM CHLORIDE 4 UG/ML
39 INJECTION INTRAVENOUS ONCE
Refills: 0 | Status: COMPLETED | OUTPATIENT
Start: 2020-12-23 | End: 2020-12-23

## 2020-12-23 RX ORDER — DEXMEDETOMIDINE HYDROCHLORIDE IN 0.9% SODIUM CHLORIDE 4 UG/ML
0.1 INJECTION INTRAVENOUS ONCE
Refills: 0 | Status: DISCONTINUED | OUTPATIENT
Start: 2020-12-23 | End: 2020-12-23

## 2020-12-23 RX ORDER — CASPOFUNGIN ACETATE 7 MG/ML
50 INJECTION, POWDER, LYOPHILIZED, FOR SOLUTION INTRAVENOUS EVERY 24 HOURS
Refills: 0 | Status: DISCONTINUED | OUTPATIENT
Start: 2020-12-24 | End: 2020-12-27

## 2020-12-23 RX ORDER — CASPOFUNGIN ACETATE 7 MG/ML
70 INJECTION, POWDER, LYOPHILIZED, FOR SOLUTION INTRAVENOUS ONCE
Refills: 0 | Status: COMPLETED | OUTPATIENT
Start: 2020-12-23 | End: 2020-12-23

## 2020-12-23 RX ORDER — DEXMEDETOMIDINE HYDROCHLORIDE IN 0.9% SODIUM CHLORIDE 4 UG/ML
0.5 INJECTION INTRAVENOUS
Qty: 200 | Refills: 0 | Status: DISCONTINUED | OUTPATIENT
Start: 2020-12-23 | End: 2020-12-27

## 2020-12-23 RX ORDER — CASPOFUNGIN ACETATE 7 MG/ML
INJECTION, POWDER, LYOPHILIZED, FOR SOLUTION INTRAVENOUS
Refills: 0 | Status: DISCONTINUED | OUTPATIENT
Start: 2020-12-23 | End: 2020-12-27

## 2020-12-23 RX ADMIN — Medication 6 MILLIGRAM(S): at 05:21

## 2020-12-23 RX ADMIN — DEXMEDETOMIDINE HYDROCHLORIDE IN 0.9% SODIUM CHLORIDE 9.64 MICROGRAM(S)/KG/HR: 4 INJECTION INTRAVENOUS at 23:23

## 2020-12-23 RX ADMIN — Medication 4: at 00:38

## 2020-12-23 RX ADMIN — CASPOFUNGIN ACETATE 70 MILLIGRAM(S): 7 INJECTION, POWDER, LYOPHILIZED, FOR SOLUTION INTRAVENOUS at 17:26

## 2020-12-23 RX ADMIN — Medication 4: at 06:36

## 2020-12-23 RX ADMIN — DEXMEDETOMIDINE HYDROCHLORIDE IN 0.9% SODIUM CHLORIDE 9.64 MICROGRAM(S)/KG/HR: 4 INJECTION INTRAVENOUS at 09:54

## 2020-12-23 RX ADMIN — PHENYLEPHRINE HYDROCHLORIDE 14.5 MICROGRAM(S)/KG/MIN: 10 INJECTION INTRAVENOUS at 08:58

## 2020-12-23 RX ADMIN — MEROPENEM 100 MILLIGRAM(S): 1 INJECTION INTRAVENOUS at 05:21

## 2020-12-23 RX ADMIN — PHENYLEPHRINE HYDROCHLORIDE 14.5 MICROGRAM(S)/KG/MIN: 10 INJECTION INTRAVENOUS at 23:50

## 2020-12-23 RX ADMIN — SODIUM CHLORIDE 50 MILLILITER(S): 9 INJECTION, SOLUTION INTRAVENOUS at 00:38

## 2020-12-23 RX ADMIN — IOHEXOL 500 MILLILITER(S): 300 INJECTION, SOLUTION INTRAVENOUS at 15:12

## 2020-12-23 RX ADMIN — Medication 0: at 23:25

## 2020-12-23 RX ADMIN — HEPARIN SODIUM 500 UNIT(S)/HR: 5000 INJECTION INTRAVENOUS; SUBCUTANEOUS at 14:17

## 2020-12-23 RX ADMIN — IOHEXOL 500 MILLILITER(S): 300 INJECTION, SOLUTION INTRAVENOUS at 14:11

## 2020-12-23 RX ADMIN — MEROPENEM 100 MILLIGRAM(S): 1 INJECTION INTRAVENOUS at 17:26

## 2020-12-23 RX ADMIN — MIDODRINE HYDROCHLORIDE 10 MILLIGRAM(S): 2.5 TABLET ORAL at 13:12

## 2020-12-23 RX ADMIN — Medication 2: at 14:11

## 2020-12-23 RX ADMIN — HEPARIN SODIUM 500 UNIT(S)/HR: 5000 INJECTION INTRAVENOUS; SUBCUTANEOUS at 05:16

## 2020-12-23 RX ADMIN — PHENYLEPHRINE HYDROCHLORIDE 14.5 MICROGRAM(S)/KG/MIN: 10 INJECTION INTRAVENOUS at 00:38

## 2020-12-23 RX ADMIN — MIDODRINE HYDROCHLORIDE 10 MILLIGRAM(S): 2.5 TABLET ORAL at 23:23

## 2020-12-23 RX ADMIN — DEXMEDETOMIDINE HYDROCHLORIDE IN 0.9% SODIUM CHLORIDE 60.84 MICROGRAM(S): 4 INJECTION INTRAVENOUS at 09:47

## 2020-12-23 NOTE — PROGRESS NOTE ADULT - SUBJECTIVE AND OBJECTIVE BOX
Patient is a 88y old  Male who presents with a chief complaint of weakness, covid (18 Dec 2020 10:34)      Subjective: Patient seen    PAIN: N  DYSPNEA: N	  NAUS/VOM: 	N  SECRETIONS: 	N  AGITATION: N    OTHER REVIEW OF SYSTEMS: negative    Vital Signs Last 24 Hrs  T(C): 36.8 (23 Dec 2020 13:07), Max: 37 (23 Dec 2020 05:35)  T(F): 98.3 (23 Dec 2020 13:07), Max: 98.6 (23 Dec 2020 05:35)  HR: 143 (23 Dec 2020 10:30) (65 - 143)  BP: 107/65 (23 Dec 2020 10:30) (92/56 - 133/64)  BP(mean): 79 (23 Dec 2020 10:30) (70 - 95)  RR: 30 (23 Dec 2020 10:30) (22 - 42)  SpO2: 77% (23 Dec 2020 10:30) (75% - 99%)        142  |  102  |  95<H>  ----------------------------<  201<H>  4.4   |  30  |  3.40<H>    Ca    7.5<L>      23 Dec 2020 05:16  Phos  4.7       Mg     2.8         TPro  5.3<L>  /  Alb  1.6<L>  /  TBili  2.1<H>  /  DBili  1.50<H>  /  AST  56<H>  /  ALT  54  /  AlkPhos  134<H>                            12.5   14.36 )-----------( 137      ( 23 Dec 2020 05:16 )             35.5     PTT - ( 23 Dec 2020 05:16 )  PTT:85.7 sec  CAPILLARY BLOOD GLUCOSE      POCT Blood Glucose.: 223 mg/dL (23 Dec 2020 06:15)  POCT Blood Glucose.: 225 mg/dL (23 Dec 2020 00:14)  POCT Blood Glucose.: 201 mg/dL (22 Dec 2020 17:13)    Urinalysis Basic - ( 21 Dec 2020 16:13 )    Color: Yellow / Appearance: Slightly Turbid / S.010 / pH: x  Gluc: x / Ketone: Negative  / Bili: Negative / Urobili: Negative   Blood: x / Protein: Negative / Nitrite: Negative   Leuk Esterase: Negative / RBC: 6-10 /HPF / WBC 3-5   Sq Epi: x / Non Sq Epi: Occasional / Bacteria: Occasional              dexAMETHasone  Injectable 6 milliGRAM(s) IV Push daily  dexMEDEtomidine Bolus 39 MICROGram(s) IV Bolus once  dexMEDEtomidine Infusion 0.5 MICROgram(s)/kG/Hr IV Continuous <Continuous>  dextrose 40% Gel 15 Gram(s) Oral once  dextrose 5%. 1000 milliLiter(s) IV Continuous <Continuous>  dextrose 5%. 1000 milliLiter(s) IV Continuous <Continuous>  dextrose 50% Injectable 25 Gram(s) IV Push once  dextrose 50% Injectable 12.5 Gram(s) IV Push once  dextrose 50% Injectable 25 Gram(s) IV Push once  glucagon  Injectable 1 milliGRAM(s) IntraMuscular once  heparin   Injectable 6500 Unit(s) IV Push every 6 hours PRN  heparin   Injectable 3000 Unit(s) IV Push every 6 hours PRN  heparin  Infusion.  Unit(s)/Hr IV Continuous <Continuous>  insulin lispro (ADMELOG) corrective regimen sliding scale   SubCutaneous every 6 hours  iohexol 300 mG (iodine)/mL Oral Solution 500 milliLiter(s) Oral every 1 hour  lactated ringers. 1000 milliLiter(s) IV Continuous <Continuous>  meropenem  IVPB 500 milliGRAM(s) IV Intermittent every 12 hours  midodrine 10 milliGRAM(s) Oral every 8 hours  ondansetron Injectable 4 milliGRAM(s) IV Push every 6 hours PRN  phenylephrine    Infusion 0.5 MICROgram(s)/kG/Min IV Continuous <Continuous>      GENERAL:  on high flow  HEENT:  NC/AT   NECK: supple no JVD  CVS:  +S1 S2 RRR  RESP: decreased bs  GI:  soft NT/ND +BS  : no suprapubic tenderness  MUSC:  no lower extremity edema  SKIN:  Warm, moist, no rashes   LYMPH: normal     MEDS REVIEWED	            ADVANCED DIRECTIVES:         DNR     DNI    MOLST    PSYCHOSOCIAL-SPIRITUAL ASSESSMENT:    _x__Reviewed     x___Care  plan unchanged     ___Care plan adjusted as below    GOALS OF CARE DISCUSSION  	x___Palliative care info/counseling provided	    ___Family meeting  	_x__Advanced Directives addressed	    _x__See previous Palliative Medicine Note    AGENCY CHOICE DISCUSSED:   ___HOSPICE   ___CALVARY  ___OTHER:              > 50% OF THE TIME SPENT IN COUNSELING AND COORDINATING CARE 	    Minutes:      PROLONGED SERVICE             FACE TO FACE:    PT            PT & FAMILY	       Minutes:      Advance Care Planning Time:

## 2020-12-23 NOTE — ADVANCED PRACTICE NURSE CONSULT - ASSESSMENT
Patient is an 89yo male admitted with acute respiratory distress syndrome (ARDS) 2/2 SARS-COV-2: He has a PMHX of HLD, HTN, GOUT,  Patient is an 89yo male admitted with acute respiratory distress syndrome (ARDS) 2/2 SARS-COV-2: He has a PMHX of HLD, HTN, GOUT,  Presents with a deep tissue pressure injury (DTPI) 5 x 7 the injury extends from sacral region to left and right buttocks: No drainage periwound is intact no erythema present

## 2020-12-23 NOTE — PROGRESS NOTE ADULT - ASSESSMENT
88M with PMHx HTN, DM, HLD, dementia who presented to the hospital with weakness and fever. Recently dx'd with COVID.  Admitted to medical service with COVID PNA. Receiving steroids and Remdesivir.   Now with AF RVR, transferred to ICU for hypotension and tachycardiac    AF:  - Converted to NSR  - has gotten pushes of amio, and has been on dilt, now off  - Would consider resuming amiodarone infusion to help maintain NSR.  Can also do PO load to 5g if patient able to tolerate PO  -AC with heparin gtt    HF:  - unknown LV function  - defer diuresis at this point given hypotension and pressor requirements   - monitor renal function and electrolytes  - check TTE    - stricts I&O's, daily weights    COVID  - BiPAP  - on remdesivir and decadron  - pulm/ICU input appreciated    AZALEA  - Cr worsening.   - strict I/Os, goal even to negative fluid balance,   - trend Cr      - Monitor and replete lytes, keep K>4 and Mg >2  - Further cardiac workup will depend on clinical course.   - All other workup per primary team

## 2020-12-23 NOTE — PROGRESS NOTE ADULT - SUBJECTIVE AND OBJECTIVE BOX
Patient is a 88y old  Male who presents with a chief complaint of weakness, covid (21 Dec 2020 09:18)       HPI:  87 yo M with HTN DM HLD dementia (walks with cane) who p/w gen weakness x past several days. Patient has not been eating or drinking for the past couple of days. He started having fevers and chills today 101.4. Patient's wife returned home from [facility] on . The daughter, who lives in the same house, works in a school. They all started to get sick after after wife got home on .   Pt was recently diagnosed with COVID as mult members in family have the same. Pt sent to hospital as famly can no longer care for this patient at home - pt was seen in ed yesterday for fall -- no acute findings -- and was sent home. Pt may have slid off chair today - no inj. Pt denies complaints. No abd pain. No acute dyspnea. No other acute co.  (14 Dec 2020 16:50)     No acute overnight events.     PAST MEDICAL & SURGICAL HISTORY:  Gout    Hyperlipidemia    Hypertension         FAMILY HISTORY:  NC    Social History:Non smoker    MEDICATIONS  (STANDING):  dexAMETHasone  Injectable 6 milliGRAM(s) IV Push daily  dextrose 40% Gel 15 Gram(s) Oral once  dextrose 5%. 1000 milliLiter(s) (50 mL/Hr) IV Continuous <Continuous>  dextrose 5%. 1000 milliLiter(s) (100 mL/Hr) IV Continuous <Continuous>  dextrose 50% Injectable 25 Gram(s) IV Push once  dextrose 50% Injectable 12.5 Gram(s) IV Push once  dextrose 50% Injectable 25 Gram(s) IV Push once  glucagon  Injectable 1 milliGRAM(s) IntraMuscular once  insulin lispro (ADMELOG) corrective regimen sliding scale   SubCutaneous every 6 hours  meropenem  IVPB 500 milliGRAM(s) IV Intermittent every 12 hours  phenylephrine    Infusion 0.5 MICROgram(s)/kG/Min (14.5 mL/Hr) IV Continuous <Continuous>  sodium bicarbonate  Infusion 0.195 mEq/kG/Hr (100 mL/Hr) IV Continuous <Continuous>    MEDICATIONS  (PRN):  ondansetron Injectable 4 milliGRAM(s) IV Push every 6 hours PRN Nausea and/or Vomiting   Meds reviewed    Allergies    penicillins (Unknown)  sulfa drugs (Unknown)    Intolerances         REVIEW OF SYSTEMS:    Limited due to respiratory status      Vital Signs Last 24 Hrs  T(C): 36.4 (21 Dec 2020 12:00), Max: 36.6 (21 Dec 2020 00:00)  T(F): 97.5 (21 Dec 2020 12:00), Max: 97.8 (21 Dec 2020 00:00)  HR: 97 (21 Dec 2020 12:30) (58 - 132)  BP: 85/54 (21 Dec 2020 12:30) (73/57 - 133/61)  BP(mean): 65 (21 Dec 2020 12:30) (59 - 91)  RR: 32 (21 Dec 2020 12:30) (29 - 46)  SpO2: 92% (21 Dec 2020 12:30) (89% - 100%)  Daily     Daily     PHYSICAL EXAM:    General: NAD  Deferred due to COVID 19 isolation and reduce transmission    LABS:                        12.5   14.36 )-----------( 137      ( 23 Dec 2020 05:16 )             35.5         142  |  102  |  95<H>  ----------------------------<  201<H>  4.4   |  30  |  3.40<H>    Ca    7.5<L>      23 Dec 2020 05:16  Phos  4.7       Mg     2.8         TPro  5.3<L>  /  Alb  1.6<L>  /  TBili  2.1<H>  /  DBili  1.50<H>  /  AST  56<H>  /  ALT  54  /  AlkPhos  134<H>      PTT - ( 23 Dec 2020 05:16 )  PTT:85.7 sec  Urinalysis Basic - ( 21 Dec 2020 16:13 )    Color: Yellow / Appearance: Slightly Turbid / S.010 / pH: x  Gluc: x / Ketone: Negative  / Bili: Negative / Urobili: Negative   Blood: x / Protein: Negative / Nitrite: Negative   Leuk Esterase: Negative / RBC: 6-10 /HPF / WBC 3-5   Sq Epi: x / Non Sq Epi: Occasional / Bacteria: Occasional      Magnesium, Serum: 2.8 mg/dL ( @ 05:16)  Phosphorus Level, Serum: 4.7 mg/dL ( @ 05:16)

## 2020-12-23 NOTE — PROGRESS NOTE ADULT - ASSESSMENT
AZALEA on CKD stage 3  COVID 19+  Metabolic Acidosis    -BLCR 1.4  -AZALEA 2/2 hypoxemic injury  -Urine lytes pending  -UA 30 protein mod blood  -IVF as ordered  -Renal indices stabilized; plateau  -Monitor urine output  -Strict &Os  -Pressors per ICU  -Patient DNR/DNI  -No acute indication for dialysis; poor candidate given age and comorbidities        d/w ICU

## 2020-12-23 NOTE — PROGRESS NOTE ADULT - SUBJECTIVE AND OBJECTIVE BOX
[facility] - Alethea Christian Grossman, [place], [place], Duy Regalado  Office Number:  [phone]    Patient in bed.  Mod resp distress.  On BIPAP.  Remains on IV pressor support    F/U for:  AF    Telemetry: Converted to NSR     MEDICATIONS  (STANDING):  dexAMETHasone  Injectable 6 milliGRAM(s) IV Push daily  dexMEDEtomidine Bolus 39 MICROGram(s) IV Bolus once  dextrose 40% Gel 15 Gram(s) Oral once  dextrose 5%. 1000 milliLiter(s) (50 mL/Hr) IV Continuous <Continuous>  dextrose 5%. 1000 milliLiter(s) (100 mL/Hr) IV Continuous <Continuous>  dextrose 50% Injectable 25 Gram(s) IV Push once  dextrose 50% Injectable 12.5 Gram(s) IV Push once  dextrose 50% Injectable 25 Gram(s) IV Push once  glucagon  Injectable 1 milliGRAM(s) IntraMuscular once  heparin  Infusion.  Unit(s)/Hr (14 mL/Hr) IV Continuous <Continuous>  insulin lispro (ADMELOG) corrective regimen sliding scale   SubCutaneous every 6 hours  lactated ringers. 1000 milliLiter(s) (50 mL/Hr) IV Continuous <Continuous>  meropenem  IVPB 500 milliGRAM(s) IV Intermittent every 12 hours  midodrine 10 milliGRAM(s) Oral every 8 hours  phenylephrine    Infusion 0.5 MICROgram(s)/kG/Min (14.5 mL/Hr) IV Continuous <Continuous>    MEDICATIONS  (PRN):  heparin   Injectable 6500 Unit(s) IV Push every 6 hours PRN For aPTT less than 40  heparin   Injectable 3000 Unit(s) IV Push every 6 hours PRN For aPTT between 40 - 57  ondansetron Injectable 4 milliGRAM(s) IV Push every 6 hours PRN Nausea and/or Vomiting      Allergies    penicillins (Unknown)  sulfa drugs (Unknown)    Intolerances        Vital Signs Last 24 Hrs  T(C): 36.2 (23 Dec 2020 08:03), Max: 37 (23 Dec 2020 05:35)  T(F): 97.2 (23 Dec 2020 08:03), Max: 98.6 (23 Dec 2020 05:35)  HR: 72 (23 Dec 2020 08:00) (65 - 113)  BP: 109/77 (23 Dec 2020 07:00) (76/52 - 133/64)  BP(mean): 90 (23 Dec 2020 07:00) (58 - 95)  RR: 24 (23 Dec 2020 08:00) (22 - 42)  SpO2: 93% (23 Dec 2020 08:00) (86% - 100%)    I&O's Summary    22 Dec 2020 07:01  -  23 Dec 2020 07:00  --------------------------------------------------------  IN: 3017.8 mL / OUT: 880 mL / NET: 2137.8 mL        ON EXAM:    Constitutional: well-nourished, well-developed, NAD   HEENT:  MMM, sclerae anicteric, conjunctivae clear, no oral cyanosis.  Pulmonary: Non-labored, breath sounds are clear bilaterally, No wheezing, rales or rhonchi  Cardiovascular: distant, Regular, S1 and S2.  No murmur.  No rubs, gallops or clicks  Gastrointestinal: Bowel Sounds present, soft, nontender.   Lymph: No peripheral edema.   Neurological: leth  Skin: No rashes.  Psych:  leth      LABS: All Labs Reviewed:                        12.5   14.36 )-----------( 137      ( 23 Dec 2020 05:16 )             35.5                         12.1   11.83 )-----------( 147      ( 22 Dec 2020 05:51 )             33.6                         13.3   11.93 )-----------( 171      ( 22 Dec 2020 00:39 )             36.8     23 Dec 2020 05:16    142    |  102    |  95     ----------------------------<  201    4.4     |  30     |  3.40   22 Dec 2020 05:51    139    |  97     |  92     ----------------------------<  163    3.2     |  30     |  3.40   21 Dec 2020 10:23    136    |  96     |  90     ----------------------------<  182    3.6     |  23     |  3.70     Ca    7.5        23 Dec 2020 05:16  Ca    7.2        22 Dec 2020 05:51  Ca    7.3        21 Dec 2020 10:23  Phos  4.7       23 Dec 2020 05:16  Phos  4.4       22 Dec 2020 05:51  Phos  4.4       21 Dec 2020 05:42  Mg     2.8       23 Dec 2020 05:16  Mg     2.6       22 Dec 2020 05:51  Mg     2.2       21 Dec 2020 05:42    TPro  5.3    /  Alb  1.6    /  TBili  2.1    /  DBili  1.50   /  AST  56     /  ALT  54     /  AlkPhos  134    23 Dec 2020 05:16  TPro  4.9    /  Alb  1.6    /  TBili  2.3    /  DBili  1.80   /  AST  81     /  ALT  67     /  AlkPhos  121    22 Dec 2020 05:51  TPro  3.7    /  Alb  1.3    /  TBili  1.7    /  DBili  x      /  AST  156    /  ALT  82     /  AlkPhos  80     21 Dec 2020 05:42    PT/INR - ( 21 Dec 2020 10:23 )   PT: 16.7 sec;   INR: 1.45 ratio         PTT - ( 23 Dec 2020 05:16 )  PTT:85.7 sec      Blood Culture: Organism --  Gram Stain Blood -- Gram Stain --  Specimen Source .Blood Blood  Culture-Blood --        12-20 @ 09:06  TSH: 0.86

## 2020-12-23 NOTE — PROGRESS NOTE ADULT - ASSESSMENT
88M with PMHx HTN, DM, HLD, dementia who presented with weakness and fever. Recently dx'd with COVID.  Admitted to medical service with COVID PNA. Progression of hypoxemia requiring more O2, got a-fib with rvr and given lopressor and amio, had hypotension and tachycardia and RRT was called, pt started on IV amio and HFNC. Now alternating between bipap and HFNC.    #Neuro   - avoiding all neurosuppressants    #CV   - pt with episode of atrial fibrillation with rvr this AM, started on IV amio  - keep K~4 and Mg>2 for optimal arrhythmia suppression  - TSH wnl  - will hold on Echo given his overall prognosis is likely poor  - c/w heparin gtt    Pulm  - on HFNC with use of bipap at night  - c/w IV steroids  - off Remdesivir in setting of britt  - pt remains DNI             GI   - will start dysphagia 1 diet with nectar thick liquids and aspiration precations  - c/w PPI  - abdominal xray negative for free air. Pt continues to have abdominal tenderness. Pt is having bowel movements. Will continue to monitor.    Renal   -Cr baseline 1.2-1.4, renal indices mildly improved today   -discontinued bicarb infusion  -avoid MAP<65, avoid nephrotoxins, strict I/Os, goal even to negative fluid balance, trend Cr    -mueller     Heme   -c/w heparin gtt    ID   - BCx NGTD  - leukocytosis and hypothermic this AM  - c/w meropenem  - off remdesivir due to britt  - c/w steroids    Endo   -ISS coverage [id number] with mod dosing  -TSH wnl      COVID 19 specific considerations and therapeutic options based on the available and rapidly changing literature    MOLST in place and remains DNR/DNI.   88M with PMHx HTN, DM, HLD, dementia who presented with weakness and fever. Recently dx'd with COVID.  Admitted to medical service with COVID PNA. Progression of hypoxemia requiring more O2, got a-fib with rvr and given lopressor and amio, had hypotension and tachycardia and RRT was called, pt started on IV amio and HFNC. Now alternating between bipap and HFNC.    #Neuro   - precedex for ct scan today  - otherwise avoiding all neurosuppressants     #CV   - NSR, off amio  - keep K~4 and Mg>2 for optimal arrhythmia suppression  - TSH wnl  - on luann for bp support. Started midodrine.   - will hold on Echo given his overall prognosis is likely poor  - c/w heparin gtt    Pulm  - on HFNC with use of bipap at night  - c/w IV steroids  - off Remdesivir in setting of britt  - pt remains DNI             GI   - will start dysphagia 1 diet with nectar thick liquids and aspiration precautions  - c/w PPI  - abdominal xray negative for free air. Pt continues to have abdominal tenderness and elevated lactate. F/u CT abdomen and pelvis.    Renal   -Cr baseline 1.2-1.4, renal indices stable/downtrending, today 3.4  -avoid MAP<65, avoid nephrotoxins, strict I/Os, goal even to negative fluid balance, trend Cr    -mueller     Heme   -c/w heparin gtt    ID   - BCx NGTD  - Leukocytosis uptrending  - c/w meropenem  - F/u CT chest and abdomen  - F/u aspergillus and fungitell  - off remdesivir due to britt  - c/w steroids    Endo   -ISS coverage [id number] with mod dosing  -TSH wnl      COVID 19 specific considerations and therapeutic options based on the available and rapidly changing literature    MOLST in place and remains DNR/DNI.   88M with PMHx HTN, DM, HLD, dementia who presented with weakness and fever. Recently dx'd with COVID.  Admitted to medical service with COVID PNA. Progression of hypoxemia requiring more O2, got a-fib with rvr and given lopressor and amio, had hypotension and tachycardia and RRT was called, pt started on IV amio and HFNC. Now alternating between bipap and HFNC.    #Neuro   - precedex for ct scan today  - otherwise avoiding all neurosuppressants     #CV   - NSR, off amio  - keep K~4 and Mg>2 for optimal arrhythmia suppression  - TSH wnl  - on luann for bp support. Started midodrine.   - will hold on Echo given his overall prognosis is likely poor  - c/w heparin gtt    Pulm  - on HFNC with use of bipap at night  - c/w IV steroids  - off Remdesivir in setting of britt  - pt remains DNI             GI   - will start dysphagia 1 diet with nectar thick liquids and aspiration precautions  - c/w PPI  - abdominal xray negative for free air. Pt continues to have abdominal tenderness and elevated lactate. F/u CT abdomen and pelvis.    Renal   -Cr baseline 1.2-1.4, renal indices stable/downtrending, today 3.4  -avoid MAP<65, avoid nephrotoxins, strict I/Os, goal even to negative fluid balance, trend Cr    -mueller     Heme   -c/w heparin gtt    ID   - BCx NGTD  - Leukocytosis uptrending  - c/w meropenem  - Will add caspofungin  - F/u CT chest and abdomen  - F/u aspergillus and fungitell  - off remdesivir due to britt  - c/w steroids    Endo   -ISS coverage [id number] with mod dosing  -TSH wnl      COVID 19 specific considerations and therapeutic options based on the available and rapidly changing literature    MOLST in place and remains DNR/DNI.

## 2020-12-23 NOTE — PROGRESS NOTE ADULT - SUBJECTIVE AND OBJECTIVE BOX
Patient is a 88y old  Male who presents with a chief complaint of weakness, covid (23 Dec 2020 15:10)    HPI:  87 yo M with HTN DM HLD dementia (walks with cane) who p/w gen weakness x past several days. Patient has not been eating or drinking for the past couple of days. He started having fevers and chills today 101.4. Patient's wife returned home from [facility] on 11/30. The daughter, who lives in the same house, works in a school. They all started to get sick after after wife got home on 11/30.   Pt was recently diagnosed with COVID as mult members in family have the same. Pt sent to hospital as famly can no longer care for this patient at home - pt was seen in ed yesterday for fall -- no acute findings -- and was sent home. Pt may have slid off chair today - no inj. Pt denies complaints. No abd pain. No acute dyspnea. No other acute co.  (14 Dec 2020 16:50)    INTERVAL HPI/OVERNIGHT EVENTS:  Chart reviewed, notes reviewed.   Patient seen and examined.  Being followed by following specialists.     Consultant(s) Notes Reviewed:  [X] Yes    Care Discussed with Consultants/Other Providers: [X] Yes    REVIEW OF SYSTEMS:  CONSTITUTIONAL: No fever, weight loss, or fatigue  EYES: No eye pain, or discharge  ENMT: No sinus or throat pain  NECK: No pain or stiffness  BREASTS: No pain, masses, or nipple discharge  RESPIRATORY: No cough, wheezing, chills or hemoptysis; No shortness of breath  CARDIOVASCULAR: No chest pain, palpitations, dizziness, or leg swelling  GASTROINTESTINAL: No abdominal or epigastric pain. No nausea, vomiting.  GENITOURINARY: No dysuria, frequency, hematuria, or incontinence  NEUROLOGICAL: No loss of strength, numbness, or tremors  SKIN: No itching, burning, rashes, or lesions   LYMPH NODES: No enlarged glands  ENDOCRINE: No polydipsia or polyuria  MUSCULOSKELETAL: No muscle, back, or extremity pain  PSYCHIATRIC: No depression, anxiety, mood swings.  HEME/LYMPH: No easy bruising, or bleeding gums  ALLERGY AND IMMUNOLOGIC: No hives or eczema    Allergies    penicillins (Unknown)  sulfa drugs (Unknown)    Intolerances      Home Medications:  acetaminophen 325 mg oral tablet: 2 tab(s) orally every 6 hours, As needed, Temp greater or equal to 38C (100.4F), Mild Pain (1 - 3) (19 Dec 2020 19:56)  allopurinol 100 mg oral tablet: 1 tab(s) orally once a day (14 Dec 2020 15:22)  atenolol 25 mg oral tablet: 1 tab(s) orally once a day (14 Dec 2020 15:22)  atorvastatin 10 mg oral tablet: 1 tab(s) orally once a day (14 Dec 2020 15:22)  dexamethasone 6 mg oral tablet: 1 tab(s) orally once a day (19 Dec 2020 19:56)  dronabinol 2.5 mg oral capsule: 1 cap(s) orally 2 times a day (19 Dec 2020 19:56)  enoxaparin 40 mg/0.4 mL injectable solution:  injectable once a day (19 Dec 2020 19:56)  memantine 5 mg oral tablet: 1 tab(s) orally once a day (14 Dec 2020 15:22)  metFORMIN 500 mg oral tablet: 1 tab(s) orally 2 times a day (14 Dec 2020 15:22)    MEDICATIONS  (STANDING):  caspofungin IVPB      dexAMETHasone  Injectable 6 milliGRAM(s) IV Push daily  dexMEDEtomidine Bolus 39 MICROGram(s) IV Bolus once  dexMEDEtomidine Infusion 0.5 MICROgram(s)/kG/Hr (9.64 mL/Hr) IV Continuous <Continuous>  dextrose 40% Gel 15 Gram(s) Oral once  dextrose 5%. 1000 milliLiter(s) (50 mL/Hr) IV Continuous <Continuous>  dextrose 5%. 1000 milliLiter(s) (100 mL/Hr) IV Continuous <Continuous>  dextrose 50% Injectable 25 Gram(s) IV Push once  dextrose 50% Injectable 12.5 Gram(s) IV Push once  dextrose 50% Injectable 25 Gram(s) IV Push once  glucagon  Injectable 1 milliGRAM(s) IntraMuscular once  heparin  Infusion.  Unit(s)/Hr (14 mL/Hr) IV Continuous <Continuous>  insulin lispro (ADMELOG) corrective regimen sliding scale   SubCutaneous every 6 hours  meropenem  IVPB 500 milliGRAM(s) IV Intermittent every 12 hours  midodrine 10 milliGRAM(s) Oral every 8 hours  phenylephrine    Infusion 0.5 MICROgram(s)/kG/Min (14.5 mL/Hr) IV Continuous <Continuous>    MEDICATIONS  (PRN):  heparin   Injectable 6500 Unit(s) IV Push every 6 hours PRN For aPTT less than 40  heparin   Injectable 3000 Unit(s) IV Push every 6 hours PRN For aPTT between 40 - 57  ondansetron Injectable 4 milliGRAM(s) IV Push every 6 hours PRN Nausea and/or Vomiting    Vital Signs Last 24 Hrs  T(C): 37.2 (23 Dec 2020 22:12), Max: 37.2 (23 Dec 2020 22:12)  T(F): 99 (23 Dec 2020 22:12), Max: 99 (23 Dec 2020 22:12)  HR: 58 (23 Dec 2020 22:12) (51 - 143)  BP: 128/61 (23 Dec 2020 21:00) (92/56 - 152/72)  BP(mean): 88 (23 Dec 2020 21:00) (70 - 110)  RR: 32 (23 Dec 2020 22:12) (18 - 38)  SpO2: 92% (23 Dec 2020 22:12) (75% - 99%)    PHYSICAL EXAM:  GENERAL: NAD, well-groomed, well-developed  HEAD:  Atraumatic, Normocephalic  EYES: EOMI, PERRLA, conjunctiva and sclera clear  ENMT: Moist mucous membranes, no lesions  NECK: Supple.  CHEST/LUNG: Clear to auscultation bilaterally; No rales, rhonchi, wheezing, or rubs  HEART: S1, S2.   ABDOMEN: Soft, Nontender, Nondistended; Bowel sounds present  EXTREMITIES:  2+ Peripheral Pulses, No clubbing, cyanosis, or edema  MS: No joint swelling or deformity.   LYMPH: No lymphadenopathy noted  SKIN: No rashes or lesions  NERVOUS SYSTEM:  No focal deficit.   PSYCH:  Awake and alert.   LABS:                         12.5   14.36 )-----------( 137      ( 23 Dec 2020 05:16 )             35.5     23 Dec 2020 05:16    142    |  102    |  95     ----------------------------<  201    4.4     |  30     |  3.40     Ca    7.5        23 Dec 2020 05:16  Phos  4.7       23 Dec 2020 05:16  Mg     2.8       23 Dec 2020 05:16    TPro  5.3    /  Alb  1.6    /  TBili  2.1    /  DBili  1.50   /  AST  56     /  ALT  54     /  AlkPhos  134    23 Dec 2020 05:16    CAPILLARY BLOOD GLUCOSE      POCT Blood Glucose.: 134 mg/dL (23 Dec 2020 17:33)  POCT Blood Glucose.: 155 mg/dL (23 Dec 2020 14:03)  POCT Blood Glucose.: 223 mg/dL (23 Dec 2020 06:15)  POCT Blood Glucose.: 225 mg/dL (23 Dec 2020 00:14)    PTT - ( 23 Dec 2020 13:32 )  PTT:66.1 sec      Ferritin, Serum: 15655 ng/mL (12-20 @ 22:45)  Ferritin, Serum: 4391 ng/mL (12-20 @ 04:56)  Ferritin, Serum: 1748 ng/mL (12-18 @ 11:03)  Ferritin, Serum: 904 ng/mL (12-16 @ 15:28)  Ferritin, Serum: 664 ng/mL (12-15 @ 09:17)  Ferritin, Serum: 640 ng/mL (12-14 @ 17:41)    Thyroid Stimulating Hormone, Serum: 0.86 uIU/mL (12-20 @ 09:06)    Creatine Kinase, Serum: 298 U/L (12-20 @ 09:06)      Culture Results:   No growth to date. (12-21 @ 00:30)    Procalcitonin, Serum: 14.77 ng/mL (12-20-20 @ 16:43)  Procalcitonin, Serum: 1.54 ng/mL (12-20-20 @ 01:24)          RADIOLOGY TEST: (IMAGES REVIEWED BY ME)    Imaging Personally Reviewed:  [X] YES      HEALTH ISSUES - PROBLEM Dx:  Atrial fibrillation  Atrial fibrillation    Respiratory failure  Respiratory failure    Hypokalemia  Hypokalemia    Hypotension  Hypotension    Pneumonia due to COVID-19 virus  Pneumonia due to COVID-19 virus    Gout  Gout    Hyperlipidemia, unspecified hyperlipidemia type  Hyperlipidemia, unspecified hyperlipidemia type    Type 2 diabetes mellitus with other specified complication, without long-term current use of insulin  Type 2 diabetes mellitus with other specified complication, without long-term current use of insulin    Essential hypertension  Essential hypertension    COVID-19  COVID-19         Patient is a 88y old  Male who presents with a chief complaint of weakness, covid (23 Dec 2020 15:10)    HPI:  87 yo M with HTN DM HLD dementia (walks with cane) who p/w gen weakness x past several days. Patient has not been eating or drinking for the past couple of days. He started having fevers and chills today 101.4. Patient's wife returned home from [facility] on 11/30. The daughter, who lives in the same house, works in a school. They all started to get sick after after wife got home on 11/30.   Pt was recently diagnosed with COVID as mult members in family have the same. Pt sent to hospital as famly can no longer care for this patient at home - pt was seen in ed yesterday for fall -- no acute findings -- and was sent home. Pt may have slid off chair today - no inj. Pt denies complaints. No abd pain. No acute dyspnea. No other acute co.  (14 Dec 2020 16:50)    INTERVAL HPI/OVERNIGHT EVENTS:  Chart reviewed, notes reviewed.   Patient seen and examined.  Being followed by following specialists: Pulmonary, ID, cardiology, nephrology,     Consultant(s) Notes Reviewed:  [X] Yes    Care Discussed with Consultants/Other Providers: [X] Yes    12/20/2020--> Events noted. Patient went in to atrial fibrillation with RVR. Rapid response was called and patient moved to ICU. Currently in ICU. Currently on BiPAP    12/23/2020 --> Remains very lethargic. Off and on BiPAP and HFNC. No acute events.     REVIEW OF SYSTEMS:  Unable to obtain.       Allergies    penicillins (Unknown)  sulfa drugs (Unknown)    Intolerances      Home Medications:  acetaminophen 325 mg oral tablet: 2 tab(s) orally every 6 hours, As needed, Temp greater or equal to 38C (100.4F), Mild Pain (1 - 3) (19 Dec 2020 19:56)  allopurinol 100 mg oral tablet: 1 tab(s) orally once a day (14 Dec 2020 15:22)  atenolol 25 mg oral tablet: 1 tab(s) orally once a day (14 Dec 2020 15:22)  atorvastatin 10 mg oral tablet: 1 tab(s) orally once a day (14 Dec 2020 15:22)  dexamethasone 6 mg oral tablet: 1 tab(s) orally once a day (19 Dec 2020 19:56)  dronabinol 2.5 mg oral capsule: 1 cap(s) orally 2 times a day (19 Dec 2020 19:56)  enoxaparin 40 mg/0.4 mL injectable solution:  injectable once a day (19 Dec 2020 19:56)  memantine 5 mg oral tablet: 1 tab(s) orally once a day (14 Dec 2020 15:22)  metFORMIN 500 mg oral tablet: 1 tab(s) orally 2 times a day (14 Dec 2020 15:22)    MEDICATIONS  (STANDING):  caspofungin IVPB      dexAMETHasone  Injectable 6 milliGRAM(s) IV Push daily  dexMEDEtomidine Bolus 39 MICROGram(s) IV Bolus once  dexMEDEtomidine Infusion 0.5 MICROgram(s)/kG/Hr (9.64 mL/Hr) IV Continuous <Continuous>  dextrose 40% Gel 15 Gram(s) Oral once  dextrose 5%. 1000 milliLiter(s) (50 mL/Hr) IV Continuous <Continuous>  dextrose 5%. 1000 milliLiter(s) (100 mL/Hr) IV Continuous <Continuous>  dextrose 50% Injectable 25 Gram(s) IV Push once  dextrose 50% Injectable 12.5 Gram(s) IV Push once  dextrose 50% Injectable 25 Gram(s) IV Push once  glucagon  Injectable 1 milliGRAM(s) IntraMuscular once  heparin  Infusion.  Unit(s)/Hr (14 mL/Hr) IV Continuous <Continuous>  insulin lispro (ADMELOG) corrective regimen sliding scale   SubCutaneous every 6 hours  meropenem  IVPB 500 milliGRAM(s) IV Intermittent every 12 hours  midodrine 10 milliGRAM(s) Oral every 8 hours  phenylephrine    Infusion 0.5 MICROgram(s)/kG/Min (14.5 mL/Hr) IV Continuous <Continuous>    MEDICATIONS  (PRN):  heparin   Injectable 6500 Unit(s) IV Push every 6 hours PRN For aPTT less than 40  heparin   Injectable 3000 Unit(s) IV Push every 6 hours PRN For aPTT between 40 - 57  ondansetron Injectable 4 milliGRAM(s) IV Push every 6 hours PRN Nausea and/or Vomiting    Vital Signs Last 24 Hrs  T(C): 37.2 (23 Dec 2020 22:12), Max: 37.2 (23 Dec 2020 22:12)  T(F): 99 (23 Dec 2020 22:12), Max: 99 (23 Dec 2020 22:12)  HR: 58 (23 Dec 2020 22:12) (51 - 143)  BP: 128/61 (23 Dec 2020 21:00) (92/56 - 152/72)  BP(mean): 88 (23 Dec 2020 21:00) (70 - 110)  RR: 32 (23 Dec 2020 22:12) (18 - 38)  SpO2: 92% (23 Dec 2020 22:12) (75% - 99%)    PHYSICAL EXAM:  GENERAL: on HFNC.   HEAD:  Atraumatic, Normocephalic  EYES: Pallor +  ENMT: Moist mucous membranes, no lesions  NECK: Supple.  CHEST/LUNG: Decreased breath sounds at bases, occasional rhonchi +  HEART: S1, S2.   ABDOMEN: Soft, Nontender, Nondistended; Bowel sounds present  EXTREMITIES:  2+ Peripheral Pulses,  MS: No joint swelling or deformity.   LYMPH: No lymphadenopathy noted  SKIN: No rashes or lesions  NERVOUS SYSTEM:  lethargic.    LABS:                         12.5   14.36 )-----------( 137      ( 23 Dec 2020 05:16 )             35.5     23 Dec 2020 05:16    142    |  102    |  95     ----------------------------<  201    4.4     |  30     |  3.40     Ca    7.5        23 Dec 2020 05:16  Phos  4.7       23 Dec 2020 05:16  Mg     2.8       23 Dec 2020 05:16    TPro  5.3    /  Alb  1.6    /  TBili  2.1    /  DBili  1.50   /  AST  56     /  ALT  54     /  AlkPhos  134    23 Dec 2020 05:16    CAPILLARY BLOOD GLUCOSE  POCT Blood Glucose.: 134 mg/dL (23 Dec 2020 17:33)  POCT Blood Glucose.: 155 mg/dL (23 Dec 2020 14:03)  POCT Blood Glucose.: 223 mg/dL (23 Dec 2020 06:15)  POCT Blood Glucose.: 225 mg/dL (23 Dec 2020 00:14)    PTT - ( 23 Dec 2020 13:32 )  PTT:66.1 sec    Ferritin, Serum: 15022 ng/mL (12-20 @ 22:45)  Ferritin, Serum: 4391 ng/mL (12-20 @ 04:56)  Ferritin, Serum: 1748 ng/mL (12-18 @ 11:03)  Ferritin, Serum: 904 ng/mL (12-16 @ 15:28)  Ferritin, Serum: 664 ng/mL (12-15 @ 09:17)  Ferritin, Serum: 640 ng/mL (12-14 @ 17:41)    Thyroid Stimulating Hormone, Serum: 0.86 uIU/mL (12-20 @ 09:06)    Creatine Kinase, Serum: 298 U/L (12-20 @ 09:06)      Culture Results:   No growth to date. (12-21 @ 00:30)    Procalcitonin, Serum: 14.77 ng/mL (12-20-20 @ 16:43)  Procalcitonin, Serum: 1.54 ng/mL (12-20-20 @ 01:24)          RADIOLOGY TEST: (IMAGES REVIEWED BY ME)    Imaging Personally Reviewed:  [X] YES      HEALTH ISSUES - PROBLEM Dx:  Atrial fibrillation  Atrial fibrillation    Respiratory failure  Respiratory failure    Hypokalemia  Hypokalemia    Hypotension  Hypotension    Pneumonia due to COVID-19 virus  Pneumonia due to COVID-19 virus    Gout  Gout    Hyperlipidemia, unspecified hyperlipidemia type  Hyperlipidemia, unspecified hyperlipidemia type    Type 2 diabetes mellitus with other specified complication, without long-term current use of insulin  Type 2 diabetes mellitus with other specified complication, without long-term current use of insulin    Essential hypertension  Essential hypertension    COVID-19  COVID-19

## 2020-12-23 NOTE — PROGRESS NOTE ADULT - ASSESSMENT
2.17.2020 This is an 88 M with dementia comes with COVID PNA. Patient requires help with ADLs. He lives with daughter and wife. His daughter is his HCP. Daughter reports that both her parents have advanced directives that state that they are a DNR/DNI. She would like to complete MOLST. MOLST was reviewed, witnessed and signed. DNR/DNI order entered into EMR.    12.18.2020 Patient still febrile and requiring oxygen. Poor po intake. On remdisivir and decadron.    12.19.2020 Afebrile x 24 hours. Continues to require oxygen.    12.20.2020 Transferred to ICU with acute on chronic hypoxic respiratory failure secondary to COVID PNA. Patient currently on BIPAP. Overall prognosis poor. Daughter aware. Patient remains a DNR/DNI.    12.21.2020 Cr worsening. Not candidate for HD. Off BIPAP. Tolerating high-flow. Overall prognosis poor.    12.22.2020 Tolerating high flow. On iv abx for superimposed infection. Prognosis poor.    12.23.2020 On high flow. For CT C/A/P. On iv abx for bacteremia, superimposed infection. Prognosis poor. Patient DNR/DNI.

## 2020-12-23 NOTE — PROGRESS NOTE ADULT - SUBJECTIVE AND OBJECTIVE BOX
Date/Time Patient Seen:  		  Referring MD:   Data Reviewed	       Patient is a 88y old  Male who presents with a chief complaint of weakness, covid (22 Dec 2020 14:20)      Subjective/HPI     PAST MEDICAL & SURGICAL HISTORY:  Gout    Hyperlipidemia    Hypertension          Medication list         MEDICATIONS  (STANDING):  dexAMETHasone  Injectable 6 milliGRAM(s) IV Push daily  dextrose 40% Gel 15 Gram(s) Oral once  dextrose 5%. 1000 milliLiter(s) (50 mL/Hr) IV Continuous <Continuous>  dextrose 5%. 1000 milliLiter(s) (100 mL/Hr) IV Continuous <Continuous>  dextrose 50% Injectable 25 Gram(s) IV Push once  dextrose 50% Injectable 12.5 Gram(s) IV Push once  dextrose 50% Injectable 25 Gram(s) IV Push once  glucagon  Injectable 1 milliGRAM(s) IntraMuscular once  heparin  Infusion.  Unit(s)/Hr (14 mL/Hr) IV Continuous <Continuous>  insulin lispro (ADMELOG) corrective regimen sliding scale   SubCutaneous every 6 hours  lactated ringers. 1000 milliLiter(s) (50 mL/Hr) IV Continuous <Continuous>  meropenem  IVPB 500 milliGRAM(s) IV Intermittent every 12 hours  midodrine 10 milliGRAM(s) Oral every 8 hours  phenylephrine    Infusion 0.5 MICROgram(s)/kG/Min (14.5 mL/Hr) IV Continuous <Continuous>    MEDICATIONS  (PRN):  heparin   Injectable 6500 Unit(s) IV Push every 6 hours PRN For aPTT less than 40  heparin   Injectable 3000 Unit(s) IV Push every 6 hours PRN For aPTT between 40 - 57  ondansetron Injectable 4 milliGRAM(s) IV Push every 6 hours PRN Nausea and/or Vomiting         Vitals log        ICU Vital Signs Last 24 Hrs  T(C): 37 (23 Dec 2020 05:35), Max: 37 (23 Dec 2020 05:35)  T(F): 98.6 (23 Dec 2020 05:35), Max: 98.6 (23 Dec 2020 05:35)  HR: 75 (23 Dec 2020 07:00) (65 - 148)  BP: 109/77 (23 Dec 2020 07:00) (76/52 - 133/64)  BP(mean): 90 (23 Dec 2020 07:00) (58 - 95)  ABP: --  ABP(mean): --  RR: 35 (23 Dec 2020 07:00) (22 - 42)  SpO2: 98% (23 Dec 2020 07:00) (86% - 100%)           Input and Output:  I&O's Detail    22 Dec 2020 07:01  -  23 Dec 2020 07:00  --------------------------------------------------------  IN:    Heparin Infusion: 143 mL    IV PiggyBack: 100 mL    IV PiggyBack: 400 mL    IV PiggyBack: 50 mL    Lactated Ringers: 1600 mL    Phenylephrine: 724.8 mL  Total IN: 3017.8 mL    OUT:    Indwelling Catheter - Urethral (mL): 880 mL  Total OUT: 880 mL    Total NET: 2137.8 mL          Lab Data                        12.5   14.36 )-----------( 137      ( 23 Dec 2020 05:16 )             35.5     12-23    142  |  102  |  95<H>  ----------------------------<  201<H>  4.4   |  30  |  3.40<H>    Ca    7.5<L>      23 Dec 2020 05:16  Phos  4.7     12-23  Mg     2.8     12-23    TPro  5.3<L>  /  Alb  1.6<L>  /  TBili  2.1<H>  /  DBili  1.50<H>  /  AST  56<H>  /  ALT  54  /  AlkPhos  134<H>  12-23            Review of Systems	      Objective     Physical Examination    heart s1s2  lung dec BS      Pertinent Lab findings & Imaging      Tariq:  NO   Adequate UO     I&O's Detail    22 Dec 2020 07:01  -  23 Dec 2020 07:00  --------------------------------------------------------  IN:    Heparin Infusion: 143 mL    IV PiggyBack: 100 mL    IV PiggyBack: 400 mL    IV PiggyBack: 50 mL    Lactated Ringers: 1600 mL    Phenylephrine: 724.8 mL  Total IN: 3017.8 mL    OUT:    Indwelling Catheter - Urethral (mL): 880 mL  Total OUT: 880 mL    Total NET: 2137.8 mL               Discussed with:     Cultures:	        Radiology

## 2020-12-23 NOTE — PROGRESS NOTE ADULT - ASSESSMENT
Patient is a 89 yo M with HTN DM HLD dementia (walks with cane) who p/w gen weakness x past several days. fever, recently diagnosed with COVID sent to hospital as family can no longer care for this patient at home - 12/14 first COVID+ PCR    RECOMMENDATIONS  12/14 NLR 3.47/0.46=7.5 97% on RA, would NOT start steroid as pt sats fine, rec LMWH prophylactic dose, with no hypoxemia and unclear duration rec NO remdesivir  blood with what appears to be a contaminant, urine with <100k enterococcus  12/15 agree with stopping steroids, continue LMWH   12/16 NLR 4/0.5=8 blood cultures with what appear to be contaminant no further Rx, on RA, continued fevers  12/17 now on NC-3.5L some concerns for secondary process will send off further testing, STEROIDs started  12/18 NC-3L, meeting criteria so REMDESIVIR started  12/19 NLR 8.92/0.65=13.7, NC 4L, continue remdesivir, steroids, lovenox, monitor CBC with diff, inflammatory markers  12/20 noted to be hypothermic today with rapid afiba and worsening CXR, transferred to ICU, placed on BIPAP. Lactic acid ~10, LFTs now elevated with AST >5x ULN and Cr 3.2 with GFR <30 - stopped remdesivir. Send for inflammatory markers and CBC with diff - will follow to evaluate for possible Tocilizumab. Send blood cultures. Start empirically on MEROPENEM . FPR 55241/14.92=8549, bandemia 19%, worsening CXR  12/21 NLR 8.09/0.38=21.3, prior enterococcus in urine on 12/14, rec sending sputum, urine, follow blood cultures, suspect secondary bacterial infection, send fungitell, aspergillus galactamanan  blood cultures NGTD  12/22 NLR 10.79/0.5=21 concern for secondary bacterial infection, send fungitell, aspergillus galactamanan  12/23 NLR 13.22/0.60=22, down to high flow NC, rising lactate, would add CASPOFUNGIN

## 2020-12-23 NOTE — PROGRESS NOTE ADULT - ASSESSMENT
87 yo M with HTN DM HLD dementia (walks with cane) who p/w gen weakness x past several days. Patient has not been eating or drinking for the past couple of days. He started having fevers and chills today 101.4. Patient's wife returned home from [facility] on 11/30. The daughter, who lives in the same house, works in a school. They all started to get sick after after wife got home on 11/30.   Pt was recently diagnosed with COVID as mult members in family have the same. Pt sent to hospital as famly can no longer care for this patient at home - pt was seen in ed yesterday for fall -- no acute findings -- and was sent home. Pt may have slid off chair today - no inj. Pt denies complaints. No abd pain. No acute dyspnea. No other acute co.  (14 Dec 2020 16:50)

## 2020-12-23 NOTE — CHART NOTE - NSCHARTNOTEFT_GEN_A_CORE
Assessment: Pt seen in ICU for follow-up. As per chart pt is a 88 year old male with a PMH of HTN, DM, HLD, dementia who presented with weakness and fever. Recently dx'd with COVID.  Admitted to medical service with COVID PNA. Progression of hypoxemia requiring more O2, got a-fib with rvr and given lopressor and amio, had hypotension and tachycardia and RRT was called, pt started on IV amio and HFNC. Now alternating between bipap and HFNC.     Pt continues on pressors. Pt was previously receiving Dysphagia 1 puree with nectar thick liquids. S/P NGT placement on 12/22, per RN NGT to be used for TF as pt is having very poor PO intake. Per RN pt is currently NPO as he is awaiting a CT scan with contrast. Noted to be on nocturnal BiPAP, recommend to feed via NGT when BiPAP is off. No GI distress noted at this time, +BM today.     Factors impacting intake: [ ] none [ ] nausea  [ ] vomiting [ ] diarrhea [ ] constipation  [ ]chewing problems [ ] swallowing issues  [x ] other: tenuous respiratory status     Diet Presciption: Diet, DASH/TLC:   Sodium & Cholesterol Restricted  Consistent Carbohydrate {Evening Snack}  Dysphagia 1, Pureed, Nectar Consistency Fluid (DYS1NC) (12-22-20 @ 10:54)    Intake: poor     Current Weight: (12/20) 176.3lbs  Previous Weight: (12/18) 176.3lbs  Admission Weight: 170lbs   % Weight Change- recommend to obtain pt's updated body weight, continue monitoring weights     Pertinent Medications: MEDICATIONS  (STANDING):  dexAMETHasone  Injectable 6 milliGRAM(s) IV Push daily  dexMEDEtomidine Bolus 39 MICROGram(s) IV Bolus once  dexMEDEtomidine Infusion 0.5 MICROgram(s)/kG/Hr (9.64 mL/Hr) IV Continuous <Continuous>  dextrose 40% Gel 15 Gram(s) Oral once  dextrose 5%. 1000 milliLiter(s) (50 mL/Hr) IV Continuous <Continuous>  dextrose 5%. 1000 milliLiter(s) (100 mL/Hr) IV Continuous <Continuous>  dextrose 50% Injectable 25 Gram(s) IV Push once  dextrose 50% Injectable 12.5 Gram(s) IV Push once  dextrose 50% Injectable 25 Gram(s) IV Push once  glucagon  Injectable 1 milliGRAM(s) IntraMuscular once  heparin  Infusion.  Unit(s)/Hr (14 mL/Hr) IV Continuous <Continuous>  insulin lispro (ADMELOG) corrective regimen sliding scale   SubCutaneous every 6 hours  iohexol 300 mG (iodine)/mL Oral Solution 500 milliLiter(s) Oral every 1 hour  lactated ringers. 1000 milliLiter(s) (50 mL/Hr) IV Continuous <Continuous>  meropenem  IVPB 500 milliGRAM(s) IV Intermittent every 12 hours  midodrine 10 milliGRAM(s) Oral every 8 hours  phenylephrine    Infusion 0.5 MICROgram(s)/kG/Min (14.5 mL/Hr) IV Continuous <Continuous>    MEDICATIONS  (PRN):  heparin   Injectable 6500 Unit(s) IV Push every 6 hours PRN For aPTT less than 40  heparin   Injectable 3000 Unit(s) IV Push every 6 hours PRN For aPTT between 40 - 57  ondansetron Injectable 4 milliGRAM(s) IV Push every 6 hours PRN Nausea and/or Vomiting    Pertinent Labs: 12-23 Na142 mmol/L Glu 201 mg/dL<H> K+ 4.4 mmol/L Cr  3.40 mg/dL<H> BUN 95 mg/dL<H> 12-23 Phos 4.7 mg/dL<H> 12-23 Alb 1.6 g/dL<L>, Hgb 12.5, Hct 35.5, Calcium 7.5,, Magnesium 2.8     CAPILLARY BLOOD GLUCOSE    POCT Blood Glucose.: 223 mg/dL (23 Dec 2020 06:15)  POCT Blood Glucose.: 225 mg/dL (23 Dec 2020 00:14)  POCT Blood Glucose.: 201 mg/dL (22 Dec 2020 17:13)    Skin: DTI sacrum   +1 generalized edema     Estimated Needs:   [ x] no change since previous assessment  [ ] recalculated:     Previous Nutrition Diagnosis:   [ x] Increased Nutrient Needs (protein)     Nutrition Diagnosis is [x ] ongoing- being addressed with pending TF via NGT     New Nutrition Diagnosis: [x ] not applicable       Interventions:   Recommend  [ ] Change Diet To:  [ ] Nutrition Supplement  [x ] Nutrition Support: If TF is warranted recommend Pivot 1.5 starting at 20ml and increase by 10ml every 6 hours until goal rate of 65ml x 20hrs for a total volume of 1,300ml/day, provided a total of 1,950 kcal/day, 121 gram protein/day.   [x ] Other:   1) Monitor pt's tolerance to TF if warranted  2) Monitor pt's weight, skin, edema, GI distress     Monitoring and Evaluation:   [ x] PO intake [ x ] Tolerance to diet prescription [ x ] weights [ x ] labs[ x ] follow up per protocol  [x ] other: RD to remain available

## 2020-12-23 NOTE — PROGRESS NOTE ADULT - SUBJECTIVE AND OBJECTIVE BOX
Time of evaluation: 1420    Called to evaluate patient for restraints        Other interventions attempted: de-escalation, orientation check, environment modification, medication assessment    REVIEW OF SYSTEMS: UTO pt confused   CONSTITUTIONAL: [  ] fever   [  ] fatigue  EYES: [  ] visual disturbances  ENMT:  [  ]difficulty hearing  [  ] throat pain  RESPIRATORY:  [  ]cough  [   ] wheezing  [   ] hemoptysis [  ] shortness of breath  CARDIOVASCULAR: [  ]chest pain  [  ] palpitations   GASTROINTESTINAL: [  ]  abdominal pain [  ] nausea [  ] vomiting  [  ] diarrhea  [  ] constipation  GENITOURINARY: [  ] dysuria [  ] frequency  [  ] hematuria [  ] incontinence  NEUROLOGICAL: [  ] headaches [  ] new numbness  SKIN: [  ]itching [  ] new rash   MUSCULOSKELETAL: [  ] back pain  [  ] extremity pain  PSYCHIATRIC: [  ] depression  [  ] anxiety  [  ] mood swings [  ] difficulty sleeping  ALLERGY AND IMMUNOLOGIC: [  ] hives    [ x ]Unable to perfrom ROS due to: Pt confused   [  ] ROS reviewed and all negative    PAST MEDICAL & SURGICAL HISTORY:  Gout    Hyperlipidemia    Hypertension      MEDICATIONS  (STANDING):  caspofungin IVPB      caspofungin IVPB 70 milliGRAM(s) IV Intermittent once  dexAMETHasone  Injectable 6 milliGRAM(s) IV Push daily  dexMEDEtomidine Bolus 39 MICROGram(s) IV Bolus once  dexMEDEtomidine Infusion 0.5 MICROgram(s)/kG/Hr (9.64 mL/Hr) IV Continuous <Continuous>  dextrose 40% Gel 15 Gram(s) Oral once  dextrose 5%. 1000 milliLiter(s) (50 mL/Hr) IV Continuous <Continuous>  dextrose 5%. 1000 milliLiter(s) (100 mL/Hr) IV Continuous <Continuous>  dextrose 50% Injectable 25 Gram(s) IV Push once  dextrose 50% Injectable 12.5 Gram(s) IV Push once  dextrose 50% Injectable 25 Gram(s) IV Push once  glucagon  Injectable 1 milliGRAM(s) IntraMuscular once  heparin  Infusion.  Unit(s)/Hr (14 mL/Hr) IV Continuous <Continuous>  insulin lispro (ADMELOG) corrective regimen sliding scale   SubCutaneous every 6 hours  iohexol 300 mG (iodine)/mL Oral Solution 500 milliLiter(s) Oral every 1 hour  lactated ringers. 1000 milliLiter(s) (50 mL/Hr) IV Continuous <Continuous>  meropenem  IVPB 500 milliGRAM(s) IV Intermittent every 12 hours  midodrine 10 milliGRAM(s) Oral every 8 hours  phenylephrine    Infusion 0.5 MICROgram(s)/kG/Min (14.5 mL/Hr) IV Continuous <Continuous>    MEDICATIONS  (PRN):  heparin   Injectable 6500 Unit(s) IV Push every 6 hours PRN For aPTT less than 40  heparin   Injectable 3000 Unit(s) IV Push every 6 hours PRN For aPTT between 40 - 57  ondansetron Injectable 4 milliGRAM(s) IV Push every 6 hours PRN Nausea and/or Vomiting                          12.5   14.36 )-----------( 137      ( 23 Dec 2020 05:16 )             35.5     12-23    142  |  102  |  95<H>  ----------------------------<  201<H>  4.4   |  30  |  3.40<H>    Ca    7.5<L>      23 Dec 2020 05:16  Phos  4.7     12-23  Mg     2.8     12-23    TPro  5.3<L>  /  Alb  1.6<L>  /  TBili  2.1<H>  /  DBili  1.50<H>  /  AST  56<H>  /  ALT  54  /  AlkPhos  134<H>  12-23      Vital Signs Last 24 Hrs  T(C): 36.8 (23 Dec 2020 13:07), Max: 37 (23 Dec 2020 05:35)  T(F): 98.3 (23 Dec 2020 13:07), Max: 98.6 (23 Dec 2020 05:35)  HR: 143 (23 Dec 2020 10:30) (65 - 143)  BP: 107/65 (23 Dec 2020 10:30) (92/56 - 133/64)  BP(mean): 79 (23 Dec 2020 10:30) (70 - 95)  RR: 30 (23 Dec 2020 10:30) (22 - 42)  SpO2: 77% (23 Dec 2020 10:30) (75% - 99%)    Physical Examination:  General: slightly sedated, responsive No acute distress.    HEENT: Pupils equal, round, reactive to light. Symmetric.  PULM: Clear to auscultation bilaterally, no significant sputum production  CVS:irreg irreg   ABD: Soft, nondistended, nontender, normoactive bowel sounds, no masses  EXT: No edema, nontender  SKIN: Warm and well perfused.      [  ] Unable to perform physical exam due to    [X ] I have evaluated this patient and have determined that restraints are warranted to optimize medical care. Patient was assessed for current physical and psychological risk factors as well as special needs. There are no medical conditions or limitations that would place this patient at risk while in restraints.    Type of restraint: Bilateral soft wrist restraints    Behavioral criteria for discontinuation of restraint: [ X ] See order    Attending MD Aware

## 2020-12-23 NOTE — PROGRESS NOTE ADULT - SUBJECTIVE AND OBJECTIVE BOX
[facility] DIVISION of INFECTIOUS DISEASE  Denver Lew MD PhD, Maxine Rodriguez MD, Anahy Syed MD, Jelena Regalado MD  and providing coverage with Katherine Martinez MD and Mala Bray MD  Providing Infectious Disease Consultations at [facility], [facility], [facility]      Office# [phone] to schedule follow up appointments  Answering Service for urgent calls or New Consults [phone]  Cell# to text for urgent issues Denver Lew [phone]     Infectious diseases progress note:    SPENCER LEVY is a 88y y. o. Male patient    COVID Patient    Allergies    penicillins (Unknown)  sulfa drugs (Unknown)    Intolerances        ANTIBIOTICS/RELEVANT:  antimicrobials  meropenem  IVPB 500 milliGRAM(s) IV Intermittent every 12 hours    immunologic:    OTHER:  dexAMETHasone  Injectable 6 milliGRAM(s) IV Push daily  dexMEDEtomidine Bolus 39 MICROGram(s) IV Bolus once  dextrose 40% Gel 15 Gram(s) Oral once  dextrose 5%. 1000 milliLiter(s) IV Continuous <Continuous>  dextrose 5%. 1000 milliLiter(s) IV Continuous <Continuous>  dextrose 50% Injectable 25 Gram(s) IV Push once  dextrose 50% Injectable 12.5 Gram(s) IV Push once  dextrose 50% Injectable 25 Gram(s) IV Push once  glucagon  Injectable 1 milliGRAM(s) IntraMuscular once  heparin   Injectable 6500 Unit(s) IV Push every 6 hours PRN  heparin   Injectable 3000 Unit(s) IV Push every 6 hours PRN  heparin  Infusion.  Unit(s)/Hr IV Continuous <Continuous>  insulin lispro (ADMELOG) corrective regimen sliding scale   SubCutaneous every 6 hours  lactated ringers. 1000 milliLiter(s) IV Continuous <Continuous>  midodrine 10 milliGRAM(s) Oral every 8 hours  ondansetron Injectable 4 milliGRAM(s) IV Push every 6 hours PRN  phenylephrine    Infusion 0.5 MICROgram(s)/kG/Min IV Continuous <Continuous>      Objective:  Vital Signs Last 24 Hrs  T(C): 36.2 (23 Dec 2020 08:03), Max: 37 (23 Dec 2020 05:35)  T(F): 97.2 (23 Dec 2020 08:03), Max: 98.6 (23 Dec 2020 05:35)  HR: 72 (23 Dec 2020 08:00) (65 - 113)  BP: 109/77 (23 Dec 2020 07:00) (76/52 - 133/64)  BP(mean): 90 (23 Dec 2020 07:00) (58 - 95)  RR: 24 (23 Dec 2020 08:00) (22 - 42)  SpO2: 93% (23 Dec 2020 08:00) (86% - 100%)    T(C): 36.2 (20 @ 08:03), Max: 37 (20 @ 05:35)  T(C): 36.2 (20 @ 08:03), Max: 37 (20 @ 05:35)  T(C): 36.2 (20 @ 08:03), Max: 37.1 (20 @ 02:35)    PHYSICAL EXAM:  HEENT: NC atraumatic  Neck: supple  Respiratory: no accessory muscle use, breathing comfortably  Cardiovascular: distant  Gastrointestinal: normal appearing, nondistended  Extremities: no clubbing, no cyanosis,      LABS:                          12.5   14.36 )-----------( 137      ( 23 Dec 2020 05:16 )             35.5       14.36  @ 05:16  11.83  @ 05:51  11.93  @ 00:39  9.44  @ 05:41  9.32  @ 13:38  10.06  @ 07:29  9.34 18 @ 09:59  8.33  @ 07:31  4.87  @ 10:13          142  |  102  |  95<H>  ----------------------------<  201<H>  4.4   |  30  |  3.40<H>    Ca    7.5<L>      23 Dec 2020 05:16  Phos  4.7       Mg     2.8         TPro  5.3<L>  /  Alb  1.6<L>  /  TBili  2.1<H>  /  DBili  1.50<H>  /  AST  56<H>  /  ALT  54  /  AlkPhos  134<H>        Creatinine, Serum: 3.40 mg/dL (20 @ 05:16)  Creatinine, Serum: 3.40 mg/dL (20 @ 05:51)  Creatinine, Serum: 3.70 mg/dL (20 @ 10:23)  Creatinine, Serum: 3.20 mg/dL (20 @ 05:42)  Creatinine, Serum: 3.20 mg/dL (20 @ 09:06)  Creatinine, Serum: 1.70 mg/dL (20 @ 07:29)  Creatinine, Serum: 1.40 mg/dL (20 @ 09:59)  Creatinine, Serum: 1.30 mg/dL (20 @ 10:13)      PT/INR - ( 21 Dec 2020 10:23 )   PT: 16.7 sec;   INR: 1.45 ratio         PTT - ( 23 Dec 2020 05:16 )  PTT:85.7 sec  Urinalysis Basic - ( 21 Dec 2020 16:13 )    Color: Yellow / Appearance: Slightly Turbid / S.010 / pH: x  Gluc: x / Ketone: Negative  / Bili: Negative / Urobili: Negative   Blood: x / Protein: Negative / Nitrite: Negative   Leuk Esterase: Negative / RBC: 6-10 /HPF / WBC 3-5   Sq Epi: x / Non Sq Epi: Occasional / Bacteria: Occasional            COVID RISK SCORE  Auto Neutrophil #: 13.22 K/uL (20 @ 05:16)  Auto Lymphocyte #: 0.60 K/uL (20 @ 05:16)  Auto Neutrophil #: 10.79 K/uL (20 @ 05:51)  Auto Lymphocyte #: 0.51 K/uL (20 @ 05:51)  Auto Neutrophil #: 8.09 K/uL (20 @ 05:41)  Auto Lymphocyte #: 0.38 K/uL (20 @ 05:41)  Auto Neutrophil #: 8.02 K/uL (20 @ 13:38)  Auto Lymphocyte #: 0.84 K/uL (20 @ 13:38)  Auto Neutrophil #: 8.92 K/uL (20 @ 07:29)  Auto Lymphocyte #: 0.65 K/uL (20 @ 07:29)  Auto Neutrophil #: 7.76 K/uL (20 @ 07:31)  Auto Lymphocyte #: 0.29 K/uL (20 @ 07:31)  Auto Neutrophil #: 4.13 K/uL (20 @ 10:13)  Auto Lymphocyte #: 0.50 K/uL (20 @ 10:13)  Auto Neutrophil #: 2.56 K/uL (12-15-20 @ 06:00)  Auto Lymphocyte #: 0.57 K/uL (12-15-20 @ 06:00)  Auto Neutrophil #: 3.47 K/uL (20 @ 12:44)  Auto Lymphocyte #: 0.46 K/uL (20 @ 12:44)    Lactate, Blood: 4.8 mmol/L (20 @ 05:16)  Lactate, Blood: 4.0 mmol/L (20 @ 19:50)  Lactate, Blood: 7.2 mmol/L (20 @ 10:31)  Lactate, Blood: 10.0 mmol/L (20 @ 16:43)  Lactate, Blood: 10.7 mmol/L (20 @ 11:02)  Lactate, Blood: 1.1 mmol/L (12-15-20 @ 06:00)  Lactate, Blood: 2.2 mmol/L (20 @ 12:44)    Auto Eosinophil #: 0.00 K/uL (20 @ 05:16)  Auto Eosinophil #: 0.00 K/uL (20 @ 05:51)  Auto Eosinophil #: 0.00 K/uL (20 @ 05:41)  Auto Eosinophil #: 0.00 K/uL (20 @ 13:38)  Auto Eosinophil #: 0.00 K/uL (20 @ 07:29)  Auto Eosinophil #: 0.00 K/uL (20 @ 07:31)  Auto Eosinophil #: 0.00 K/uL (20 @ 10:13)  Auto Eosinophil #: 0.00 K/uL (12-15-20 @ 06:00)  Auto Eosinophil #: 0.00 K/uL (20 @ 12:44)    Lactate Dehydrogenase, Serum: 882 U/L (20 @ 22:45)  Lactate Dehydrogenase, Serum: 426 U/L (20 @ 15:40)  Lactate Dehydrogenase, Serum: 265 U/L (12-15-20 @ 09:19)  Lactate Dehydrogenase, Serum: 285 U/L (20 @ 17:41)    Sedimentation Rate, Erythrocyte: 22 mm/hr (20 @ 01:24)  Sedimentation Rate, Erythrocyte: 25 mm/hr (12-15-20 @ 06:00)    Procalcitonin, Serum: 14.77 ng/mL (20 @ 16:43)  Procalcitonin, Serum: 1.54 ng/mL (20 @ 01:24)  Procalcitonin, Serum: 0.49 ng/mL (20 @ 07:31)  Procalcitonin, Serum: 0.13 ng/mL (12-15-20 @ 06:00)  Procalcitonin, Serum: 0.13 ng/mL (20 @ 12:44)    Troponin I, Serum: .015 ng/mL (20 @ 12:44)    Creatine Kinase, Serum: 298 U/L (20 @ 09:06)  Creatine Kinase, Serum: 253 U/L (12-15-20 @ 06:00)  Creatine Kinase, Serum: 273 U/L (20 @ 12:44)        Ferritin, Serum: 91872 ng/mL (20 @ 22:45)  Ferritin, Serum: 4391 ng/mL (20 @ 04:56)  Ferritin, Serum: 1748 ng/mL (20 @ 11:03)  Ferritin, Serum: 904 ng/mL (20 @ 15:28)  Ferritin, Serum: 664 ng/mL (12-15-20 @ 09:17)  Ferritin, Serum: 640 ng/mL (20 @ 17:41)        Activated Partial Thromboplastin Time: 85.7 sec (20 @ 05:16)  Activated Partial Thromboplastin Time: 199.9 sec (20 @ 19:50)  Activated Partial Thromboplastin Time: >200.0 sec (20 @ 09:26)  Activated Partial Thromboplastin Time: > 200 sec (20 @ 00:39)  INR: 1.45 ratio (20 @ 10:23)  Activated Partial Thromboplastin Time: 40.7 sec (20 @ 10:23)  INR: 1.08 ratio (20 @ 01:24)  Activated Partial Thromboplastin Time: 34.2 sec (20 @ 01:24)  Activated Partial Thromboplastin Time: 30.0 sec (20 @ 12:44)  INR: 1.06 ratio (20 @ 12:44)    D-Dimer Assay, Quantitative: 2980 ng/mL DDU (20 @ 16:43)  D-Dimer Assay, Quantitative: 1198 ng/mL DDU (20 @ 07:31)  D-Dimer Assay, Quantitative: 1774 ng/mL DDU (20 @ 10:13)  D-Dimer Assay, Quantitative: 931 ng/mL DDU (12-15-20 @ 06:00)  D-Dimer Assay, Quantitative: 3136 ng/mL DDU (20 @ 12:44)        MICROBIOLOGY:              RADIOLOGY & ADDITIONAL STUDIES:

## 2020-12-24 LAB
ALBUMIN SERPL ELPH-MCNC: 1.1 G/DL — LOW (ref 3.3–5)
ALBUMIN SERPL ELPH-MCNC: 1.1 G/DL — LOW (ref 3.3–5)
ALP SERPL-CCNC: 119 U/L — SIGNIFICANT CHANGE UP (ref 40–120)
ALP SERPL-CCNC: 138 U/L — HIGH (ref 40–120)
ALT FLD-CCNC: 30 U/L — SIGNIFICANT CHANGE UP (ref 12–78)
ALT FLD-CCNC: 33 U/L — SIGNIFICANT CHANGE UP (ref 12–78)
ANION GAP SERPL CALC-SCNC: 11 MMOL/L — SIGNIFICANT CHANGE UP (ref 5–17)
APTT BLD: 60.5 SEC — HIGH (ref 27.5–35.5)
AST SERPL-CCNC: 32 U/L — SIGNIFICANT CHANGE UP (ref 15–37)
AST SERPL-CCNC: 35 U/L — SIGNIFICANT CHANGE UP (ref 15–37)
BASOPHILS # BLD AUTO: 0.08 K/UL — SIGNIFICANT CHANGE UP (ref 0–0.2)
BASOPHILS NFR BLD AUTO: 0.5 % — SIGNIFICANT CHANGE UP (ref 0–2)
BILIRUB DIRECT SERPL-MCNC: 0.7 MG/DL — HIGH (ref 0.05–0.2)
BILIRUB INDIRECT FLD-MCNC: 0.7 MG/DL — SIGNIFICANT CHANGE UP (ref 0.2–1)
BILIRUB SERPL-MCNC: 1.3 MG/DL — HIGH (ref 0.2–1.2)
BILIRUB SERPL-MCNC: 1.4 MG/DL — HIGH (ref 0.2–1.2)
BUN SERPL-MCNC: 78 MG/DL — HIGH (ref 7–23)
CALCIUM SERPL-MCNC: 6.3 MG/DL — CRITICAL LOW (ref 8.5–10.1)
CHLORIDE SERPL-SCNC: 107 MMOL/L — SIGNIFICANT CHANGE UP (ref 96–108)
CO2 SERPL-SCNC: 25 MMOL/L — SIGNIFICANT CHANGE UP (ref 22–31)
CREAT SERPL-MCNC: 2.3 MG/DL — HIGH (ref 0.5–1.3)
EOSINOPHIL # BLD AUTO: 0 K/UL — SIGNIFICANT CHANGE UP (ref 0–0.5)
EOSINOPHIL NFR BLD AUTO: 0 % — SIGNIFICANT CHANGE UP (ref 0–6)
GLUCOSE SERPL-MCNC: 219 MG/DL — HIGH (ref 70–99)
HCT VFR BLD CALC: 37.5 % — LOW (ref 39–50)
HGB BLD-MCNC: 13 G/DL — SIGNIFICANT CHANGE UP (ref 13–17)
IMM GRANULOCYTES NFR BLD AUTO: 1.6 % — HIGH (ref 0–1.5)
LYMPHOCYTES # BLD AUTO: 0.69 K/UL — LOW (ref 1–3.3)
LYMPHOCYTES # BLD AUTO: 3.9 % — LOW (ref 13–44)
MAGNESIUM SERPL-MCNC: 2.2 MG/DL — SIGNIFICANT CHANGE UP (ref 1.6–2.6)
MCHC RBC-ENTMCNC: 29.9 PG — SIGNIFICANT CHANGE UP (ref 27–34)
MCHC RBC-ENTMCNC: 34.7 GM/DL — SIGNIFICANT CHANGE UP (ref 32–36)
MCV RBC AUTO: 86.2 FL — SIGNIFICANT CHANGE UP (ref 80–100)
MONOCYTES # BLD AUTO: 0.45 K/UL — SIGNIFICANT CHANGE UP (ref 0–0.9)
MONOCYTES NFR BLD AUTO: 2.5 % — SIGNIFICANT CHANGE UP (ref 2–14)
NEUTROPHILS # BLD AUTO: 16.15 K/UL — HIGH (ref 1.8–7.4)
NEUTROPHILS NFR BLD AUTO: 91.5 % — HIGH (ref 43–77)
NRBC # BLD: 0 /100 WBCS — SIGNIFICANT CHANGE UP (ref 0–0)
PHOSPHATE SERPL-MCNC: 3.7 MG/DL — SIGNIFICANT CHANGE UP (ref 2.5–4.5)
PLATELET # BLD AUTO: 78 K/UL — LOW (ref 150–400)
POTASSIUM SERPL-MCNC: 3.2 MMOL/L — LOW (ref 3.5–5.3)
POTASSIUM SERPL-SCNC: 3.2 MMOL/L — LOW (ref 3.5–5.3)
PROT SERPL-MCNC: 4.1 G/DL — LOW (ref 6–8.3)
PROT SERPL-MCNC: 4.2 G/DL — LOW (ref 6–8.3)
RBC # BLD: 4.35 M/UL — SIGNIFICANT CHANGE UP (ref 4.2–5.8)
RBC # FLD: 13.2 % — SIGNIFICANT CHANGE UP (ref 10.3–14.5)
SODIUM SERPL-SCNC: 143 MMOL/L — SIGNIFICANT CHANGE UP (ref 135–145)
WBC # BLD: 17.65 K/UL — HIGH (ref 3.8–10.5)
WBC # FLD AUTO: 17.65 K/UL — HIGH (ref 3.8–10.5)

## 2020-12-24 PROCEDURE — 99291 CRITICAL CARE FIRST HOUR: CPT | Mod: CS

## 2020-12-24 PROCEDURE — 99291 CRITICAL CARE FIRST HOUR: CPT

## 2020-12-24 RX ORDER — FUROSEMIDE 40 MG
40 TABLET ORAL ONCE
Refills: 0 | Status: COMPLETED | OUTPATIENT
Start: 2020-12-24 | End: 2020-12-24

## 2020-12-24 RX ORDER — AMIODARONE HYDROCHLORIDE 400 MG/1
150 TABLET ORAL ONCE
Refills: 0 | Status: COMPLETED | OUTPATIENT
Start: 2020-12-24 | End: 2020-12-24

## 2020-12-24 RX ORDER — POTASSIUM CHLORIDE 20 MEQ
40 PACKET (EA) ORAL EVERY 4 HOURS
Refills: 0 | Status: COMPLETED | OUTPATIENT
Start: 2020-12-24 | End: 2020-12-24

## 2020-12-24 RX ORDER — AMIODARONE HYDROCHLORIDE 400 MG/1
0.5 TABLET ORAL
Qty: 900 | Refills: 0 | Status: DISCONTINUED | OUTPATIENT
Start: 2020-12-24 | End: 2020-12-25

## 2020-12-24 RX ORDER — AMIODARONE HYDROCHLORIDE 400 MG/1
1 TABLET ORAL
Qty: 900 | Refills: 0 | Status: DISCONTINUED | OUTPATIENT
Start: 2020-12-24 | End: 2020-12-25

## 2020-12-24 RX ORDER — VANCOMYCIN HCL 1 G
1000 VIAL (EA) INTRAVENOUS ONCE
Refills: 0 | Status: COMPLETED | OUTPATIENT
Start: 2020-12-24 | End: 2020-12-24

## 2020-12-24 RX ADMIN — AMIODARONE HYDROCHLORIDE 33.3 MG/MIN: 400 TABLET ORAL at 16:42

## 2020-12-24 RX ADMIN — MIDODRINE HYDROCHLORIDE 10 MILLIGRAM(S): 2.5 TABLET ORAL at 21:28

## 2020-12-24 RX ADMIN — Medication 40 MILLIEQUIVALENT(S): at 11:07

## 2020-12-24 RX ADMIN — Medication 4: at 11:22

## 2020-12-24 RX ADMIN — Medication 40 MILLIGRAM(S): at 14:02

## 2020-12-24 RX ADMIN — Medication 10: at 17:07

## 2020-12-24 RX ADMIN — Medication 6 MILLIGRAM(S): at 05:43

## 2020-12-24 RX ADMIN — DEXMEDETOMIDINE HYDROCHLORIDE IN 0.9% SODIUM CHLORIDE 9.64 MICROGRAM(S)/KG/HR: 4 INJECTION INTRAVENOUS at 14:02

## 2020-12-24 RX ADMIN — CASPOFUNGIN ACETATE 260 MILLIGRAM(S): 7 INJECTION, POWDER, LYOPHILIZED, FOR SOLUTION INTRAVENOUS at 14:05

## 2020-12-24 RX ADMIN — PHENYLEPHRINE HYDROCHLORIDE 14.5 MICROGRAM(S)/KG/MIN: 10 INJECTION INTRAVENOUS at 11:04

## 2020-12-24 RX ADMIN — Medication 250 MILLIGRAM(S): at 16:55

## 2020-12-24 RX ADMIN — PHENYLEPHRINE HYDROCHLORIDE 14.5 MICROGRAM(S)/KG/MIN: 10 INJECTION INTRAVENOUS at 05:44

## 2020-12-24 RX ADMIN — MEROPENEM 100 MILLIGRAM(S): 1 INJECTION INTRAVENOUS at 17:06

## 2020-12-24 RX ADMIN — HEPARIN SODIUM 500 UNIT(S)/HR: 5000 INJECTION INTRAVENOUS; SUBCUTANEOUS at 06:50

## 2020-12-24 RX ADMIN — MEROPENEM 100 MILLIGRAM(S): 1 INJECTION INTRAVENOUS at 05:44

## 2020-12-24 RX ADMIN — AMIODARONE HYDROCHLORIDE 206 MILLIGRAM(S): 400 TABLET ORAL at 08:15

## 2020-12-24 RX ADMIN — MIDODRINE HYDROCHLORIDE 10 MILLIGRAM(S): 2.5 TABLET ORAL at 05:43

## 2020-12-24 RX ADMIN — MIDODRINE HYDROCHLORIDE 10 MILLIGRAM(S): 2.5 TABLET ORAL at 14:05

## 2020-12-24 RX ADMIN — Medication 40 MILLIEQUIVALENT(S): at 08:15

## 2020-12-24 RX ADMIN — Medication 4: at 05:42

## 2020-12-24 NOTE — PROVIDER CONTACT NOTE (CRITICAL VALUE NOTIFICATION) - SITUATION
Ca 6.3
lactate 10.7
Blood CULTURES 12/14: Growth in  aerobic / anaerobic bottles Gram + cocci in clusters
crutical serum glucose 797 and calcium 6.0

## 2020-12-24 NOTE — PROVIDER CONTACT NOTE (CRITICAL VALUE NOTIFICATION) - ACTION/TREATMENT ORDERED:
monitor
following heparin protocol
heparin protocol
awaiting orders
No new orders given, Pt had previously received Vancomycin 1000 mg/ 250 , will continue to monitor.

## 2020-12-24 NOTE — PROGRESS NOTE ADULT - ASSESSMENT
88M with PMHx HTN, DM, HLD, dementia who presented with weakness and fever. Recently dx'd with COVID.  Admitted to medical service with COVID PNA. Progression of hypoxemia requiring more O2, got a-fib with rvr and given lopressor and amio, had hypotension and tachycardia and RRT was called, pt started on IV amio and HFNC. Now alternating between bipap and HFNC.    #Neuro   - precedex for ct scan today  - otherwise avoiding all neurosuppressants     #CV   - NSR, off amio  - keep K~4 and Mg>2 for optimal arrhythmia suppression  - TSH wnl  - on luann for bp support. Started midodrine.   - will hold on Echo given his overall prognosis is likely poor  - c/w heparin gtt    Pulm  - on HFNC with use of bipap at night  - c/w IV steroids  - off Remdesivir in setting of britt  - pt remains DNI             GI   - will start dysphagia 1 diet with nectar thick liquids and aspiration precautions  - c/w PPI  - abdominal xray negative for free air. Pt continues to have abdominal tenderness and elevated lactate. F/u CT abdomen and pelvis.    Renal   -Cr baseline 1.2-1.4, renal indices stable/downtrending, today 3.4  -avoid MAP<65, avoid nephrotoxins, strict I/Os, goal even to negative fluid balance, trend Cr    -mueller     Heme   -c/w heparin gtt    ID   - BCx NGTD  - Leukocytosis uptrending  - c/w meropenem  - Will add caspofungin  - F/u CT chest and abdomen  - F/u aspergillus and fungitell  - off remdesivir due to britt  - c/w steroids    Endo   -ISS coverage [id number] with mod dosing  -TSH wnl      COVID 19 specific considerations and therapeutic options based on the available and rapidly changing literature    MOLST in place and remains DNR/DNI.   88M with PMHx HTN, DM, HLD, dementia who presented with weakness and fever. Recently dx'd with COVID.  Admitted to medical service with COVID PNA. Progression of hypoxemia requiring more O2, got a-fib with rvr and given lopressor and amio, had hypotension and tachycardia and RRT was called, pt started on IV amio and HFNC. Now alternating between bipap and HFNC.    #Neuro   - avoiding all neurosuppressants     #CV   - Gave bolus of amio this AM for a-fib. Will start on amio drip 1 mg/min for 6 hours and then .5 mg/min for 18 hours.  - keep K~4 and Mg>2 for optimal arrhythmia suppression  - TSH wnl  - on luann for bp support  - c/w midodrine  - will hold on Echo given his overall prognosis is likely poor  - c/w heparin gtt    Pulm  - on HFNC with use of bipap at night  - c/w IV steroids  - off Remdesivir in setting of britt  - pt remains DNI             GI   - npo diet with tube feeds  - c/w PPI  - CT abdomen shows small amount of ascites but no acute abdominal pathology    Renal   -Cr baseline 1.2-1.4, renal indices elevated but downtrending  -avoid MAP<65, avoid nephrotoxins, strict I/Os, goal even to negative fluid balance, trend Cr    -mueller     Heme   -c/w heparin gtt    ID   - BCx NGTD  - Leukocytosis uptrending  - c/w meropenem and caspofungin  - CT chest and abdomen:  severe diffuse b/l airspace disease compatible w/ PNA. Small b/l pleural effusions. Small amount of ascites but no acute abdominal pathology.  - F/u aspergillus and fungitell  - off remdesivir due to britt  - c/w steroids    Endo   -ISS coverage [id number] with mod dosing  -TSH wnl      COVID 19 specific considerations and therapeutic options based on the available and rapidly changing literature    MOLST in place and remains DNR/DNI.

## 2020-12-24 NOTE — PROGRESS NOTE ADULT - SUBJECTIVE AND OBJECTIVE BOX
Follow up: resp failure    HPI:  89 yo M with HTN DM HLD dementia (walks with cane) who p/w gen weakness x past several days. Patient has not been eating or drinking for the past couple of days. He started having fevers and chills today 101.4. Patient's wife returned home from [facility] on 11/30. The daughter, who lives in the same house, works in a school. They all started to get sick after after wife got home on 11/30.   Pt was recently diagnosed with COVID as mult members in family have the same. Pt sent to hospital as famly can no longer care for this patient at home - pt was seen in ed yesterday for fall -- no acute findings -- and was sent home. Pt may have slid off chair today - no inj. Pt denies complaints. No abd pain. No acute dyspnea. No other acute co.  (14 Dec 2020 16:50)    Patient remains obtunded on oxygen mask    PAST MEDICAL & SURGICAL HISTORY:  Gout    Hyperlipidemia    Hypertension        MEDICATIONS  (STANDING):  aMIOdarone Infusion 1 mG/Min (33.3 mL/Hr) IV Continuous <Continuous>  aMIOdarone Infusion 0.5 mG/Min (16.7 mL/Hr) IV Continuous <Continuous>  caspofungin IVPB      caspofungin IVPB 50 milliGRAM(s) IV Intermittent every 24 hours  dexAMETHasone  Injectable 6 milliGRAM(s) IV Push daily  dexMEDEtomidine Infusion 0.5 MICROgram(s)/kG/Hr (9.64 mL/Hr) IV Continuous <Continuous>  dextrose 40% Gel 15 Gram(s) Oral once  dextrose 5%. 1000 milliLiter(s) (50 mL/Hr) IV Continuous <Continuous>  dextrose 5%. 1000 milliLiter(s) (100 mL/Hr) IV Continuous <Continuous>  dextrose 50% Injectable 25 Gram(s) IV Push once  dextrose 50% Injectable 12.5 Gram(s) IV Push once  dextrose 50% Injectable 25 Gram(s) IV Push once  glucagon  Injectable 1 milliGRAM(s) IntraMuscular once  heparin  Infusion.  Unit(s)/Hr (14 mL/Hr) IV Continuous <Continuous>  insulin lispro (ADMELOG) corrective regimen sliding scale   SubCutaneous every 6 hours  meropenem  IVPB 500 milliGRAM(s) IV Intermittent every 12 hours  midodrine 10 milliGRAM(s) Oral every 8 hours  phenylephrine    Infusion 0.5 MICROgram(s)/kG/Min (14.5 mL/Hr) IV Continuous <Continuous>    MEDICATIONS  (PRN):  heparin   Injectable 6500 Unit(s) IV Push every 6 hours PRN For aPTT less than 40  heparin   Injectable 3000 Unit(s) IV Push every 6 hours PRN For aPTT between 40 - 57  ondansetron Injectable 4 milliGRAM(s) IV Push every 6 hours PRN Nausea and/or Vomiting      REVIEW OF SYSTEMS: unobtainable    Vital Signs Last 24 Hrs  T(C): 36.2 (24 Dec 2020 16:22), Max: 37.2 (23 Dec 2020 22:12)  T(F): 97.2 (24 Dec 2020 16:22), Max: 99 (23 Dec 2020 22:12)  HR: 68 (24 Dec 2020 16:30) (55 - 131)  BP: 91/66 (24 Dec 2020 11:30) (78/45 - 140/95)  BP(mean): 74 (24 Dec 2020 11:30) (57 - 109)  RR: 29 (24 Dec 2020 11:30) (18 - 48)  SpO2: 94% (24 Dec 2020 16:30) (80% - 97%)    I&O's Summary    23 Dec 2020 07:01  -  24 Dec 2020 07:00  --------------------------------------------------------  IN: 3291.6 mL / OUT: 1020 mL / NET: 2271.6 mL    24 Dec 2020 07:01  -  24 Dec 2020 17:11  --------------------------------------------------------  IN: 609.5 mL / OUT: 200 mL / NET: 409.5 mL        PHYSICAL EXAM:    Constitutional: lethargic  Eyes:   Pupils round, no lesions  ENMT: no exudate or erythema  Pulmonary: scattered rhonchi  Cardiovascular: PMI not palpable RRR normal S1 and S2, no murmurs, rubs, gallops or clicks  Gastrointestinal: Bowel Sounds present, soft, nontender.   Lymph: No cervical lymphadenopathy.  Neurological: no focal deficits  Skin: No rashes.  No cyanosis.  Psych: cannot assess  Ext: No lower ext edema                                13.0   17.65 )-----------( 78       ( 24 Dec 2020 06:08 )             37.5     12-24    143  |  107  |  78<H>  ----------------------------<  219<H>  3.2<L>   |  25  |  2.30<H>    Ca    6.3<LL>      24 Dec 2020 06:08  Phos  3.7     12-24  Mg     2.2     12-24    TPro  4.1<L>  /  Alb  1.1<L>  /  TBili  1.3<H>  /  DBili  .70<H>  /  AST  32  /  ALT  30  /  AlkPhos  119  12-24      < from: 12 Lead ECG (12.22.20 @ 08:48) >    Ventricular Rate 85 BPM    Atrial Rate 85 BPM    P-R Interval 126 ms    QRS Duration 82 ms    Q-T Interval 404 ms    QTC Calculation(Bazett) 480 ms    P Axis 83 degrees    R Axis 67 degrees    T Axis 96 degrees    Diagnosis Line Normal sinus rhythm  Low voltage QRS  Possible Lateral infarct , age undetermined  Abnormal ECG  When compared with ECG of 20-DEC-2020 01:24,  Sinus rhythm has replaced Atrial fibrillation  Vent. rate has decreased BY  75 BPM  Borderline criteria for Lateral infarct arenow present  Confirmed by Denver Dillon MD ([id number]) on 12/22/2020 1:11:02 PM    < end of copied text >  < from: CT Chest w/ Oral Cont (12.23.20 @ 16:48) >    EXAM:  CT ABDOMEN AND PELVIS OC                          EXAM:  CT CHEST OC                            PROCEDURE DATE:  12/23/2020          INTERPRETATION:  CLINICAL INFORMATION: Leukocytosis, abdominal pain. Covid 19 pneumonia.    COMPARISON: CT abdomen/pelvis July 20, 2015    PROCEDURE:  CT of the Chest, Abdomen and Pelvis was performed without intravenous contrast.  Intravenous contrast: None.  Oral contrast: Positive contrast was administered.  Sagittal and coronal reformats were performed.    FINDINGS:  CHEST:  LUNGS AND LARGE AIRWAYS: Patent central airways. Severe bilateral groundglass and more solid airspace opacities.  PLEURA: Small bilateral pleural effusions, left side greater than right. Calcified bilateral pleural plaques.  VESSELS: Atherosclerotic changes of the aorta and coronary arteries.  HEART: Heart size is normal. No pericardial effusion.  MEDIASTINUM AND MALI: No lymphadenopathy.  CHEST WALL AND LOWER NECK: Within normal limits.    ABDOMEN AND PELVIS:  LIVER: Withinnormal limits.  BILE DUCTS: Normal caliber.  GALLBLADDER: Within normal limits.  SPLEEN: Within normal limits.  PANCREAS: Moderate fatty atrophy.  ADRENALS: Within normal limits.  KIDNEYS/URETERS: No hydronephrosis. Left renal cysts.    BLADDER: Potter catheter balloon within the urinary bladder lumen.  REPRODUCTIVE ORGANS: Prostate within normal limits.    BOWEL: No bowel obstruction, wall thickening or inflammatory change. Appendix not identified  PERITONEUM: Small volume ascites.  VESSELS: Atherosclerotic changes.  RETROPERITONEUM/LYMPH NODES: No lymphadenopathy.  ABDOMINAL WALL: Within normal limits.  BONES: Mild L2 wedge compression deformity, not significantly changed.    IMPRESSION:  Severe diffuse bilateral airspace disease compatible with pneumonia.  Small bilateral pleural effusions.  Small amount of ascites. No acute abdominal pathology identified.              MAYCO PRASAD MD; Attending Radiologist  This document has been electronically signed. Dec 23 2020  5:03PM    < end of copied text >

## 2020-12-24 NOTE — PROVIDER CONTACT NOTE (CRITICAL VALUE NOTIFICATION) - PERSON GIVING RESULT:
jared trejo
jose lab
Heather Javier
Jeanne Paul
Lab
marielos dang
Hannah
Kendra
yusuf rajput
Hannah Lara
LAB/ Katy Stuart
Katy

## 2020-12-24 NOTE — PROGRESS NOTE ADULT - ASSESSMENT
AZALEA on CKD stage 3  COVID 19+  Metabolic Acidosis    -BLCR 1.4  -AZALEA 2/2 hypoxemic injury  -Urine lytes reviewed  -UA 30 protein mod blood  -IVF as ordered  -Renal indices stabilized; plateau  -Monitor urine output  -Strict &Os  -Pressors per ICU  -Patient DNR/DNI  -No acute indication for dialysis; poor candidate given age and comorbidities        d/w ICU

## 2020-12-24 NOTE — PROGRESS NOTE ADULT - ASSESSMENT
Patient is a 87 yo M with HTN DM HLD dementia (walks with cane) who p/w gen weakness x past several days. fever, recently diagnosed with COVID sent to hospital as family can no longer care for this patient at home - 12/14 first COVID+ PCR    RECOMMENDATIONS  12/14 NLR 3.47/0.46=7.5 97% on RA, would NOT start steroid as pt sats fine, rec LMWH prophylactic dose, with no hypoxemia and unclear duration rec NO remdesivir  blood with what appears to be a contaminant, urine with <100k enterococcus  12/15 agree with stopping steroids, continue LMWH   12/16 NLR 4/0.5=8 blood cultures with what appear to be contaminant no further Rx, on RA, continued fevers  12/17 now on NC-3.5L some concerns for secondary process will send off further testing, STEROIDs started  12/18 NC-3L, meeting criteria so REMDESIVIR started  12/19 NLR 8.92/0.65=13.7, NC 4L, continue remdesivir, steroids, lovenox, monitor CBC with diff, inflammatory markers  12/20 noted to be hypothermic today with rapid afiba and worsening CXR, transferred to ICU, placed on BIPAP. Lactic acid ~10, LFTs now elevated with AST >5x ULN and Cr 3.2 with GFR <30 - stopped remdesivir. Send for inflammatory markers and CBC with diff - will follow to evaluate for possible Tocilizumab. Send blood cultures. Start empirically on MEROPENEM . FPR 15082/14.55=3176, bandemia 19%, worsening CXR  12/21 NLR 8.09/0.38=21.3, prior enterococcus in urine on 12/14, rec sending sputum, urine, follow blood cultures, suspect secondary bacterial infection, send fungitell, aspergillus galactamanan  blood cultures NGTD  12/22 NLR 10.79/0.5=21 concern for secondary bacterial infection, send fungitell, aspergillus galactamanan  12/23 NLR 13.22/0.60=22, down to high flow NC, rising lactate, would add CASPOFUNGIN  12/24 on BIPAP, cont current Rx, Fungitell, Asp Ag pending

## 2020-12-24 NOTE — PROGRESS NOTE ADULT - SUBJECTIVE AND OBJECTIVE BOX
[facility] DIVISION of INFECTIOUS DISEASE  Denver Lew MD PhD, Maxine Rodriguez MD, Anahy Syed MD, Jelena Regalado MD  and providing coverage with Katherine Martinez MD and Mala Bray MD  Providing Infectious Disease Consultations at [facility], [facility], [facility]'s      Office# [phone] to schedule follow up appointments  Answering Service for urgent calls or New Consults [phone]  Cell# to text for urgent issues Denver Lew [phone]     Infectious diseases progress note:    SPENCER LEVY is a 88y y. o. Male patient    COVID Patient    Allergies    penicillins (Unknown)  sulfa drugs (Unknown)    Intolerances        ANTIBIOTICS/RELEVANT:  antimicrobials  caspofungin IVPB      caspofungin IVPB 50 milliGRAM(s) IV Intermittent every 24 hours  meropenem  IVPB 500 milliGRAM(s) IV Intermittent every 12 hours    immunologic:    OTHER:  dexAMETHasone  Injectable 6 milliGRAM(s) IV Push daily  dexMEDEtomidine Bolus 39 MICROGram(s) IV Bolus once  dexMEDEtomidine Infusion 0.5 MICROgram(s)/kG/Hr IV Continuous <Continuous>  dextrose 40% Gel 15 Gram(s) Oral once  dextrose 5%. 1000 milliLiter(s) IV Continuous <Continuous>  dextrose 5%. 1000 milliLiter(s) IV Continuous <Continuous>  dextrose 50% Injectable 25 Gram(s) IV Push once  dextrose 50% Injectable 12.5 Gram(s) IV Push once  dextrose 50% Injectable 25 Gram(s) IV Push once  glucagon  Injectable 1 milliGRAM(s) IntraMuscular once  heparin   Injectable 6500 Unit(s) IV Push every 6 hours PRN  heparin   Injectable 3000 Unit(s) IV Push every 6 hours PRN  heparin  Infusion.  Unit(s)/Hr IV Continuous <Continuous>  insulin lispro (ADMELOG) corrective regimen sliding scale   SubCutaneous every 6 hours  midodrine 10 milliGRAM(s) Oral every 8 hours  ondansetron Injectable 4 milliGRAM(s) IV Push every 6 hours PRN  phenylephrine    Infusion 0.5 MICROgram(s)/kG/Min IV Continuous <Continuous>  potassium chloride   Powder 40 milliEquivalent(s) Oral every 4 hours      Objective:  Vital Signs Last 24 Hrs  T(C): 36.2 (24 Dec 2020 08:08), Max: 37.2 (23 Dec 2020 22:12)  T(F): 97.2 (24 Dec 2020 08:08), Max: 99 (23 Dec 2020 22:12)  HR: 128 (24 Dec 2020 07:30) (51 - 143)  BP: 99/65 (24 Dec 2020 07:00) (78/45 - 152/72)  BP(mean): 78 (24 Dec 2020 07:00) (57 - 110)  RR: 23 (24 Dec 2020 07:00) (18 - 48)  SpO2: 95% (24 Dec 2020 07:30) (77% - 97%)    T(C): 36.2 (12-24-20 @ 08:08), Max: 37.2 (12-23-20 @ 22:12)  T(C): 36.2 (12-24-20 @ 08:08), Max: 37.2 (12-23-20 @ 22:12)  T(C): 36.2 (12-24-20 @ 08:08), Max: 37.2 (12-23-20 @ 22:12)    PHYSICAL EXAM: on BIPAP  HEENT: NC atraumatic  Neck: supple  Respiratory: no accessory muscle use, breathing comfortably with Bipap  Cardiovascular: distant  Gastrointestinal: normal appearing, nondistended  Extremities: no clubbing, no cyanosis,      LABS:                          12.5   14.36 )-----------( 137      ( 23 Dec 2020 05:16 )             35.5       14.36 12-23 @ 05:16  11.83 12-22 @ 05:51  11.93 12-22 @ 00:39  9.44 12-21 @ 05:41  9.32 12-20 @ 13:38  10.06 12-19 @ 07:29  9.34 12-18 @ 09:59  8.33 12-18 @ 07:31      12-24    143  |  107  |  78<H>  ----------------------------<  219<H>  3.2<L>   |  25  |  2.30<H>    Ca    6.3<LL>      24 Dec 2020 06:08  Phos  3.7     12-24  Mg     2.2     12-24    TPro  4.1<L>  /  Alb  1.1<L>  /  TBili  1.3<H>  /  DBili  .70<H>  /  AST  32  /  ALT  30  /  AlkPhos  119  12-24      Creatinine, Serum: 2.30 mg/dL (12-24-20 @ 06:08)  Creatinine, Serum: 3.40 mg/dL (12-23-20 @ 05:16)  Creatinine, Serum: 3.40 mg/dL (12-22-20 @ 05:51)  Creatinine, Serum: 3.70 mg/dL (12-21-20 @ 10:23)  Creatinine, Serum: 3.20 mg/dL (12-21-20 @ 05:42)  Creatinine, Serum: 3.20 mg/dL (12-20-20 @ 09:06)  Creatinine, Serum: 1.70 mg/dL (12-19-20 @ 07:29)  Creatinine, Serum: 1.40 mg/dL (12-18-20 @ 09:59)      PTT - ( 24 Dec 2020 06:08 )  PTT:60.5 sec          COVID RISK SCORE  Auto Neutrophil #: 13.22 K/uL (12-23-20 @ 05:16)  Auto Lymphocyte #: 0.60 K/uL (12-23-20 @ 05:16)  Auto Neutrophil #: 10.79 K/uL (12-22-20 @ 05:51)  Auto Lymphocyte #: 0.51 K/uL (12-22-20 @ 05:51)  Auto Neutrophil #: 8.09 K/uL (12-21-20 @ 05:41)  Auto Lymphocyte #: 0.38 K/uL (12-21-20 @ 05:41)  Auto Neutrophil #: 8.02 K/uL (12-20-20 @ 13:38)  Auto Lymphocyte #: 0.84 K/uL (12-20-20 @ 13:38)  Auto Neutrophil #: 8.92 K/uL (12-19-20 @ 07:29)  Auto Lymphocyte #: 0.65 K/uL (12-19-20 @ 07:29)  Auto Neutrophil #: 7.76 K/uL (12-18-20 @ 07:31)  Auto Lymphocyte #: 0.29 K/uL (12-18-20 @ 07:31)  Auto Neutrophil #: 4.13 K/uL (12-16-20 @ 10:13)  Auto Lymphocyte #: 0.50 K/uL (12-16-20 @ 10:13)  Auto Neutrophil #: 2.56 K/uL (12-15-20 @ 06:00)  Auto Lymphocyte #: 0.57 K/uL (12-15-20 @ 06:00)  Auto Neutrophil #: 3.47 K/uL (12-14-20 @ 12:44)  Auto Lymphocyte #: 0.46 K/uL (12-14-20 @ 12:44)    Lactate, Blood: 4.8 mmol/L (12-23-20 @ 05:16)  Lactate, Blood: 4.0 mmol/L (12-22-20 @ 19:50)  Lactate, Blood: 7.2 mmol/L (12-21-20 @ 10:31)  Lactate, Blood: 10.0 mmol/L (12-20-20 @ 16:43)  Lactate, Blood: 10.7 mmol/L (12-20-20 @ 11:02)  Lactate, Blood: 1.1 mmol/L (12-15-20 @ 06:00)  Lactate, Blood: 2.2 mmol/L (12-14-20 @ 12:44)    Auto Eosinophil #: 0.00 K/uL (12-23-20 @ 05:16)  Auto Eosinophil #: 0.00 K/uL (12-22-20 @ 05:51)  Auto Eosinophil #: 0.00 K/uL (12-21-20 @ 05:41)  Auto Eosinophil #: 0.00 K/uL (12-20-20 @ 13:38)  Auto Eosinophil #: 0.00 K/uL (12-19-20 @ 07:29)  Auto Eosinophil #: 0.00 K/uL (12-18-20 @ 07:31)  Auto Eosinophil #: 0.00 K/uL (12-16-20 @ 10:13)  Auto Eosinophil #: 0.00 K/uL (12-15-20 @ 06:00)  Auto Eosinophil #: 0.00 K/uL (12-14-20 @ 12:44)    Lactate Dehydrogenase, Serum: 882 U/L (12-20-20 @ 22:45)  Lactate Dehydrogenase, Serum: 426 U/L (12-16-20 @ 15:40)  Lactate Dehydrogenase, Serum: 265 U/L (12-15-20 @ 09:19)  Lactate Dehydrogenase, Serum: 285 U/L (12-14-20 @ 17:41)    Sedimentation Rate, Erythrocyte: 22 mm/hr (12-20-20 @ 01:24)  Sedimentation Rate, Erythrocyte: 25 mm/hr (12-15-20 @ 06:00)    Procalcitonin, Serum: 14.77 ng/mL (12-20-20 @ 16:43)  Procalcitonin, Serum: 1.54 ng/mL (12-20-20 @ 01:24)  Procalcitonin, Serum: 0.49 ng/mL (12-18-20 @ 07:31)  Procalcitonin, Serum: 0.13 ng/mL (12-15-20 @ 06:00)  Procalcitonin, Serum: 0.13 ng/mL (12-14-20 @ 12:44)    Troponin I, Serum: .015 ng/mL (12-14-20 @ 12:44)    Creatine Kinase, Serum: 298 U/L (12-20-20 @ 09:06)  Creatine Kinase, Serum: 253 U/L (12-15-20 @ 06:00)  Creatine Kinase, Serum: 273 U/L (12-14-20 @ 12:44)        Ferritin, Serum: 80644 ng/mL (12-20-20 @ 22:45)  Ferritin, Serum: 4391 ng/mL (12-20-20 @ 04:56)  Ferritin, Serum: 1748 ng/mL (12-18-20 @ 11:03)  Ferritin, Serum: 904 ng/mL (12-16-20 @ 15:28)  Ferritin, Serum: 664 ng/mL (12-15-20 @ 09:17)  Ferritin, Serum: 640 ng/mL (12-14-20 @ 17:41)        Activated Partial Thromboplastin Time: 60.5 sec (12-24-20 @ 06:08)  Activated Partial Thromboplastin Time: 66.1 sec (12-23-20 @ 13:32)  Activated Partial Thromboplastin Time: 85.7 sec (12-23-20 @ 05:16)  Activated Partial Thromboplastin Time: 199.9 sec (12-22-20 @ 19:50)  Activated Partial Thromboplastin Time: >200.0 sec (12-22-20 @ 09:26)  Activated Partial Thromboplastin Time: > 200 sec (12-22-20 @ 00:39)  INR: 1.45 ratio (12-21-20 @ 10:23)  Activated Partial Thromboplastin Time: 40.7 sec (12-21-20 @ 10:23)  INR: 1.08 ratio (12-20-20 @ 01:24)  Activated Partial Thromboplastin Time: 34.2 sec (12-20-20 @ 01:24)  Activated Partial Thromboplastin Time: 30.0 sec (12-14-20 @ 12:44)  INR: 1.06 ratio (12-14-20 @ 12:44)    D-Dimer Assay, Quantitative: 2980 ng/mL DDU (12-20-20 @ 16:43)  D-Dimer Assay, Quantitative: 1198 ng/mL DDU (12-18-20 @ 07:31)  D-Dimer Assay, Quantitative: 1774 ng/mL DDU (12-16-20 @ 10:13)  D-Dimer Assay, Quantitative: 931 ng/mL DDU (12-15-20 @ 06:00)  D-Dimer Assay, Quantitative: 3136 ng/mL DDU (12-14-20 @ 12:44)        MICROBIOLOGY:              RADIOLOGY & ADDITIONAL STUDIES:

## 2020-12-24 NOTE — PROGRESS NOTE ADULT - SUBJECTIVE AND OBJECTIVE BOX
Patient is a 88y old  Male who presents with a chief complaint of weakness, covid (23 Dec 2020 15:10)    24 hour events: ***    REVIEW OF SYSTEMS  Constitutional: No fever, chills, fatigue  Neuro: No headache, numbness, weakness  Resp: No cough, wheezing, shortness of breath  CVS: No chest pain, palpitations, leg swelling  GI: No abdominal pain, nausea, vomiting, diarrhea   : No dysuria, frequency, incontinence  Skin: No itching, burning, rashes, or lesions   Msk: No joint pain or swelling  Psych: No depression, anxiety, mood swings  Heme: No bleeding    T(F): 99 (12-23-20 @ 22:12), Max: 99 (12-23-20 @ 22:12)  HR: 128 (12-24-20 @ 07:30) (51 - 143)  BP: 99/65 (12-24-20 @ 07:00) (78/45 - 152/72)  RR: 23 (12-24-20 @ 07:00) (18 - 48)  SpO2: 95% (12-24-20 @ 07:30) (75% - 97%)  Wt(kg): --            I&O's Summary    12-23 @ 07:01  -  12-24 @ 07:00  --------------------------------------------------------  IN: 3291.6 mL / OUT: 1020 mL / NET: 2271.6 mL      PHYSICAL EXAM  General:   CNS:   HEENT:   Resp:   CVS:   Abd:   Ext:   Skin:     MEDICATIONS  caspofungin IVPB   caspofungin IVPB IV Intermittent  meropenem  IVPB IV Intermittent    aMIOdarone IVPB IV Intermittent  midodrine Oral  phenylephrine    Infusion IV Continuous    dexAMETHasone  Injectable IV Push  dextrose 40% Gel Oral  dextrose 50% Injectable IV Push  dextrose 50% Injectable IV Push  dextrose 50% Injectable IV Push  glucagon  Injectable IntraMuscular  insulin lispro (ADMELOG) corrective regimen sliding scale SubCutaneous      dexMEDEtomidine Bolus IV Bolus  dexMEDEtomidine Infusion IV Continuous  ondansetron Injectable IV Push PRN      heparin   Injectable IV Push PRN  heparin   Injectable IV Push PRN  heparin  Infusion. IV Continuous        dextrose 5%. IV Continuous  dextrose 5%. IV Continuous                                12.5   14.36 )-----------( 137      ( 23 Dec 2020 05:16 )             35.5       12-24    143  |  107  |  78<H>  ----------------------------<  219<H>  3.2<L>   |  25  |  2.30<H>    Ca    6.3<LL>      24 Dec 2020 06:08  Phos  3.7     12-24  Mg     2.2     12-24    TPro  4.1<L>  /  Alb  1.1<L>  /  TBili  1.3<H>  /  DBili  .70<H>  /  AST  32  /  ALT  30  /  AlkPhos  119  12-24          PTT - ( 24 Dec 2020 06:08 )  PTT:60.5 sec    .Blood Blood   No growth to date. -- 12-21 @ 00:30        Radiology: ***  Bedside lung ultrasound: ***  Bedside ECHO: ***    CENTRAL LINE: Y/N          DATE INSERTED:              REMOVE: Y/N  MADRIGAL: Y/N                        DATE INSERTED:              REMOVE: Y/N  A-LINE: Y/N                       DATE INSERTED:              REMOVE: Y/N    GLOBAL ISSUE/BEST PRACTICE  Analgesia:   Sedation:   CAM-ICU:   HOB elevation: yes  Stress ulcer prophylaxis:   VTE prophylaxis:   Glycemic control:   Nutrition:     CODE STATUS: ***  [facility] discussion: Y       Patient is a 88y old  Male who presents with a chief complaint of weakness, covid (23 Dec 2020 15:10)    24 hour events: Gave amio bolus this AM for a-fib 120s. Otherwise, no acute overnight events.    REVIEW OF SYSTEMS: Unable to assess due to clinical condition.    T(F): 99 (12-23-20 @ 22:12), Max: 99 (12-23-20 @ 22:12)  HR: 128 (12-24-20 @ 07:30) (51 - 143)  BP: 99/65 (12-24-20 @ 07:00) (78/45 - 152/72)  RR: 23 (12-24-20 @ 07:00) (18 - 48)  SpO2: 95% (12-24-20 @ 07:30) (75% - 97%)  Wt(kg): --            I&O's Summary    12-23 @ 07:01  -  12-24 @ 07:00  --------------------------------------------------------  IN: 3291.6 mL / OUT: 1020 mL / NET: 2271.6 mL      PHYSICAL EXAM  GENERAL: on BiPAP, NAD  NEURO: lethargic but minimally responds to questions, withdraws to pain in all 4 extremities  LUNGS: clear to auscultation, no intercostal retractions  HEART: S1/S2, irregular rate and rhythm  GASTROINTESTINAL: +tenderness; soft, nondistended  INTEGUMENT: good skin turgor, warm skin, appears well perfused  EXTREMITIES: no edema, cyanosis, or clubbing     MEDICATIONS  caspofungin IVPB   caspofungin IVPB IV Intermittent  meropenem  IVPB IV Intermittent    aMIOdarone IVPB IV Intermittent  midodrine Oral  phenylephrine    Infusion IV Continuous    dexAMETHasone  Injectable IV Push  dextrose 40% Gel Oral  dextrose 50% Injectable IV Push  dextrose 50% Injectable IV Push  dextrose 50% Injectable IV Push  glucagon  Injectable IntraMuscular  insulin lispro (ADMELOG) corrective regimen sliding scale SubCutaneous      dexMEDEtomidine Bolus IV Bolus  dexMEDEtomidine Infusion IV Continuous  ondansetron Injectable IV Push PRN      heparin   Injectable IV Push PRN  heparin   Injectable IV Push PRN  heparin  Infusion. IV Continuous        dextrose 5%. IV Continuous  dextrose 5%. IV Continuous                                12.5   14.36 )-----------( 137      ( 23 Dec 2020 05:16 )             35.5       12-24    143  |  107  |  78<H>  ----------------------------<  219<H>  3.2<L>   |  25  |  2.30<H>    Ca    6.3<LL>      24 Dec 2020 06:08  Phos  3.7     12-24  Mg     2.2     12-24    TPro  4.1<L>  /  Alb  1.1<L>  /  TBili  1.3<H>  /  DBili  .70<H>  /  AST  32  /  ALT  30  /  AlkPhos  119  12-24          PTT - ( 24 Dec 2020 06:08 )  PTT:60.5 sec    .Blood Blood   No growth to date. -- 12-21 @ 00:30        Radiology:   < from: CT Chest w/ Oral Cont (12.23.20 @ 16:48) >  IMPRESSION:  Severe diffuse bilateral airspace disease compatible with pneumonia.  Small bilateral pleural effusions.  Small amount of ascites. No acute abdominal pathology identified.    < end of copied text >      CENTRAL LINE: N           KAITLIN: Y                          DATE INSERTED: 12/20/20  A-LINE: N                           GLOBAL ISSUE/BEST PRACTICE  Analgesia: N  Sedation: N  HOB elevation: yes  Stress ulcer prophylaxis:   VTE prophylaxis: Y  Glycemic control: Y  Nutrition: NPO with tube feeds    CODE STATUS: DNR/DNI  [facility] discussion: Y

## 2020-12-24 NOTE — PROGRESS NOTE ADULT - SUBJECTIVE AND OBJECTIVE BOX
Patient is a 88y old  Male who presents with a chief complaint of weakness, covid (23 Dec 2020 15:10)    HPI:  89 yo M with HTN DM HLD dementia (walks with cane) who p/w gen weakness x past several days. Patient has not been eating or drinking for the past couple of days. He started having fevers and chills today 101.4. Patient's wife returned home from [facility] on 11/30. The daughter, who lives in the same house, works in a school. They all started to get sick after after wife got home on 11/30.   Pt was recently diagnosed with COVID as mult members in family have the same. Pt sent to hospital as famly can no longer care for this patient at home - pt was seen in ed yesterday for fall -- no acute findings -- and was sent home. Pt may have slid off chair today - no inj. Pt denies complaints. No abd pain. No acute dyspnea. No other acute co.  (14 Dec 2020 16:50)    INTERVAL HPI/OVERNIGHT EVENTS:  Chart reviewed, notes reviewed.   Patient seen and examined.    On Bipap     Home Medications:  acetaminophen 325 mg oral tablet: 2 tab(s) orally every 6 hours, As needed, Temp greater or equal to 38C (100.4F), Mild Pain (1 - 3) (19 Dec 2020 19:56)  allopurinol 100 mg oral tablet: 1 tab(s) orally once a day (14 Dec 2020 15:22)  atenolol 25 mg oral tablet: 1 tab(s) orally once a day (14 Dec 2020 15:22)  atorvastatin 10 mg oral tablet: 1 tab(s) orally once a day (14 Dec 2020 15:22)  dexamethasone 6 mg oral tablet: 1 tab(s) orally once a day (19 Dec 2020 19:56)  dronabinol 2.5 mg oral capsule: 1 cap(s) orally 2 times a day (19 Dec 2020 19:56)  enoxaparin 40 mg/0.4 mL injectable solution:  injectable once a day (19 Dec 2020 19:56)  memantine 5 mg oral tablet: 1 tab(s) orally once a day (14 Dec 2020 15:22)  metFORMIN 500 mg oral tablet: 1 tab(s) orally 2 times a day (14 Dec 2020 15:22)    MEDICATIONS  (STANDING):  caspofungin IVPB      dexAMETHasone  Injectable 6 milliGRAM(s) IV Push daily  dexMEDEtomidine Bolus 39 MICROGram(s) IV Bolus once  dexMEDEtomidine Infusion 0.5 MICROgram(s)/kG/Hr (9.64 mL/Hr) IV Continuous <Continuous>  dextrose 40% Gel 15 Gram(s) Oral once  dextrose 5%. 1000 milliLiter(s) (50 mL/Hr) IV Continuous <Continuous>  dextrose 5%. 1000 milliLiter(s) (100 mL/Hr) IV Continuous <Continuous>  dextrose 50% Injectable 25 Gram(s) IV Push once  dextrose 50% Injectable 12.5 Gram(s) IV Push once  dextrose 50% Injectable 25 Gram(s) IV Push once  glucagon  Injectable 1 milliGRAM(s) IntraMuscular once  heparin  Infusion.  Unit(s)/Hr (14 mL/Hr) IV Continuous <Continuous>  insulin lispro (ADMELOG) corrective regimen sliding scale   SubCutaneous every 6 hours  meropenem  IVPB 500 milliGRAM(s) IV Intermittent every 12 hours  midodrine 10 milliGRAM(s) Oral every 8 hours  phenylephrine    Infusion 0.5 MICROgram(s)/kG/Min (14.5 mL/Hr) IV Continuous <Continuous>    MEDICATIONS  (PRN):  heparin   Injectable 6500 Unit(s) IV Push every 6 hours PRN For aPTT less than 40  heparin   Injectable 3000 Unit(s) IV Push every 6 hours PRN For aPTT between 40 - 57  ondansetron Injectable 4 milliGRAM(s) IV Push every 6 hours PRN Nausea and/or Vomiting        T(C): 36.8 (12-24-20 @ 23:33), Max: 36.8 (12-24-20 @ 23:33)  HR: 60 (12-24-20 @ 23:22) (60 - 119)  BP: 100/55 (12-24-20 @ 19:30) (99/69 - 147/61)  RR: 32 (12-24-20 @ 19:30) (31 - 42)  SpO2: 100% (12-24-20 @ 23:22) (78% - 100%)      PHYSICAL EXAM:  GENERAL: NAD, well-groomed, well-developed  HEAD:  Atraumatic, Normocephalic  EYES: EOMI, PERRLA, conjunctiva and sclera clear  ENMT: Moist mucous membranes, no lesions  NECK: Supple.  CHEST/LUNG: on [place] rales EART: S1, S2.   ABDOMEN: Soft, Nontender, Nondistended; Bowel sounds present  EXTREMITIES:  2+ Peripheral Pulses, No clubbing, cyanosis, or edema  MS: No joint swelling or deformity.   LYMPH: No lymphadenopathy noted  SKIN: No rashes or lesions  NERVOUS SYSTEM:  No focal deficit.   PSYCH:  Awake and alert.                             13.0   17.65 )-----------( 78       ( 24 Dec 2020 06:08 )             37.5           LIVER FUNCTIONS - ( 24 Dec 2020 06:08 )  Alb: 1.1 g/dL / Pro: 4.1 g/dL / ALK PHOS: 119 U/L / ALT: 30 U/L / AST: 32 U/L / GGT: x           PTT - ( 24 Dec 2020 06:08 )  PTT:60.5 sec  143|107|78<219  3.2|25|2.30  6.3,2.2,3.7  12-24 @ 06:08  Thyroid Stimulating Hormone, Serum: 0.86 uIU/mL (12-20 @ 09:06)    Creatine Kinase, Serum: 298 U/L (12-20 @ 09:06)      Culture Results:   No growth to date. (12-21 @ 00:30)    Procalcitonin, Serum: 14.77 ng/mL (12-20-20 @ 16:43)  Procalcitonin, Serum: 1.54 ng/mL (12-20-20 @ 01:24)          RADIOLOGY TEST: (IMAGES REVIEWED BY ME)    Imaging Personally Reviewed:  [X] YES      HEALTH ISSUES - PROBLEM Dx:      Pneumonia due to COVID-19 virus  Acute hypoxic respiratory failure     Currently on Biapap, nebs, plan and treatment care as per ICU

## 2020-12-24 NOTE — PROGRESS NOTE ADULT - SUBJECTIVE AND OBJECTIVE BOX
Date/Time Patient Seen:  		  Referring MD:   Data Reviewed	       Patient is a 88y old  Male who presents with a chief complaint of weakness, covid (24 Dec 2020 07:38)      Subjective/HPI     PAST MEDICAL & SURGICAL HISTORY:  Gout    Hyperlipidemia    Hypertension          Medication list         MEDICATIONS  (STANDING):  aMIOdarone IVPB 150 milliGRAM(s) IV Intermittent once  caspofungin IVPB      caspofungin IVPB 50 milliGRAM(s) IV Intermittent every 24 hours  dexAMETHasone  Injectable 6 milliGRAM(s) IV Push daily  dexMEDEtomidine Bolus 39 MICROGram(s) IV Bolus once  dexMEDEtomidine Infusion 0.5 MICROgram(s)/kG/Hr (9.64 mL/Hr) IV Continuous <Continuous>  dextrose 40% Gel 15 Gram(s) Oral once  dextrose 5%. 1000 milliLiter(s) (50 mL/Hr) IV Continuous <Continuous>  dextrose 5%. 1000 milliLiter(s) (100 mL/Hr) IV Continuous <Continuous>  dextrose 50% Injectable 25 Gram(s) IV Push once  dextrose 50% Injectable 12.5 Gram(s) IV Push once  dextrose 50% Injectable 25 Gram(s) IV Push once  glucagon  Injectable 1 milliGRAM(s) IntraMuscular once  heparin  Infusion.  Unit(s)/Hr (14 mL/Hr) IV Continuous <Continuous>  insulin lispro (ADMELOG) corrective regimen sliding scale   SubCutaneous every 6 hours  meropenem  IVPB 500 milliGRAM(s) IV Intermittent every 12 hours  midodrine 10 milliGRAM(s) Oral every 8 hours  phenylephrine    Infusion 0.5 MICROgram(s)/kG/Min (14.5 mL/Hr) IV Continuous <Continuous>    MEDICATIONS  (PRN):  heparin   Injectable 6500 Unit(s) IV Push every 6 hours PRN For aPTT less than 40  heparin   Injectable 3000 Unit(s) IV Push every 6 hours PRN For aPTT between 40 - 57  ondansetron Injectable 4 milliGRAM(s) IV Push every 6 hours PRN Nausea and/or Vomiting         Vitals log        ICU Vital Signs Last 24 Hrs  T(C): 37.2 (23 Dec 2020 22:12), Max: 37.2 (23 Dec 2020 22:12)  T(F): 99 (23 Dec 2020 22:12), Max: 99 (23 Dec 2020 22:12)  HR: 128 (24 Dec 2020 07:30) (51 - 143)  BP: 99/65 (24 Dec 2020 07:00) (78/45 - 152/72)  BP(mean): 78 (24 Dec 2020 07:00) (57 - 110)  ABP: --  ABP(mean): --  RR: 23 (24 Dec 2020 07:00) (18 - 48)  SpO2: 95% (24 Dec 2020 07:30) (75% - 97%)           Input and Output:  I&O's Detail    23 Dec 2020 07:01  -  24 Dec 2020 07:00  --------------------------------------------------------  IN:    Dexmedetomidine: 88 mL    Heparin Infusion: 120 mL    IV PiggyBack: 250 mL    IV PiggyBack: 1150 mL    Lactated Ringers: 500 mL    Phenylephrine: 913.6 mL    Pivot 1.5: 270 mL  Total IN: 3291.6 mL    OUT:    Indwelling Catheter - Urethral (mL): 1020 mL  Total OUT: 1020 mL    Total NET: 2271.6 mL          Lab Data                        12.5   14.36 )-----------( 137      ( 23 Dec 2020 05:16 )             35.5     12-24    143  |  107  |  78<H>  ----------------------------<  219<H>  3.2<L>   |  25  |  2.30<H>    Ca    6.3<LL>      24 Dec 2020 06:08  Phos  3.7     12-24  Mg     2.2     12-24    TPro  4.1<L>  /  Alb  1.1<L>  /  TBili  1.3<H>  /  DBili  .70<H>  /  AST  32  /  ALT  30  /  AlkPhos  119  12-24            Review of Systems	      Objective     Physical Examination    heart s1s2  lung dec BS  abd soft      Pertinent Lab findings & Imaging      Tariq:  NO   Adequate UO     I&O's Detail    23 Dec 2020 07:01  -  24 Dec 2020 07:00  --------------------------------------------------------  IN:    Dexmedetomidine: 88 mL    Heparin Infusion: 120 mL    IV PiggyBack: 250 mL    IV PiggyBack: 1150 mL    Lactated Ringers: 500 mL    Phenylephrine: 913.6 mL    Pivot 1.5: 270 mL  Total IN: 3291.6 mL    OUT:    Indwelling Catheter - Urethral (mL): 1020 mL  Total OUT: 1020 mL    Total NET: 2271.6 mL               Discussed with:     Cultures:	        Radiology

## 2020-12-24 NOTE — PROGRESS NOTE ADULT - SUBJECTIVE AND OBJECTIVE BOX
Patient is a 88y old  Male who presents with a chief complaint of weakness, covid (18 Dec 2020 10:34)      Subjective: Patient seen    PAIN: N  DYSPNEA: N	  NAUS/VOM: 	N  SECRETIONS: 	N  AGITATION: N    OTHER REVIEW OF SYSTEMS: negative    Vital Signs Last 24 Hrs  T(C): 36.2 (24 Dec 2020 12:02), Max: 37.2 (23 Dec 2020 22:12)  T(F): 97.2 (24 Dec 2020 12:02), Max: 99 (23 Dec 2020 22:12)  HR: 104 (24 Dec 2020 12:02) (51 - 131)  BP: 91/66 (24 Dec 2020 11:30) (78/45 - 152/72)  BP(mean): 74 (24 Dec 2020 11:30) (57 - 109)  RR: 29 (24 Dec 2020 11:30) (18 - 48)  SpO2: 93% (24 Dec 2020 12:02) (80% - 97%)    12-24    143  |  107  |  78<H>  ----------------------------<  219<H>  3.2<L>   |  25  |  2.30<H>    Ca    6.3<LL>      24 Dec 2020 06:08  Phos  3.7     12-24  Mg     2.2     12-24    TPro  4.1<L>  /  Alb  1.1<L>  /  TBili  1.3<H>  /  DBili  .70<H>  /  AST  32  /  ALT  30  /  AlkPhos  119  12-24                          13.0   17.65 )-----------( 78       ( 24 Dec 2020 06:08 )             37.5     PTT - ( 24 Dec 2020 06:08 )  PTT:60.5 sec  CAPILLARY BLOOD GLUCOSE      POCT Blood Glucose.: 247 mg/dL (24 Dec 2020 11:19)  POCT Blood Glucose.: 206 mg/dL (24 Dec 2020 05:38)  POCT Blood Glucose.: 161 mg/dL (23 Dec 2020 23:20)  POCT Blood Glucose.: 134 mg/dL (23 Dec 2020 17:33)              aMIOdarone Infusion 1 mG/Min IV Continuous <Continuous>  aMIOdarone Infusion 0.5 mG/Min IV Continuous <Continuous>  caspofungin IVPB      caspofungin IVPB 50 milliGRAM(s) IV Intermittent every 24 hours  dexAMETHasone  Injectable 6 milliGRAM(s) IV Push daily  dexMEDEtomidine Infusion 0.5 MICROgram(s)/kG/Hr IV Continuous <Continuous>  dextrose 40% Gel 15 Gram(s) Oral once  dextrose 5%. 1000 milliLiter(s) IV Continuous <Continuous>  dextrose 5%. 1000 milliLiter(s) IV Continuous <Continuous>  dextrose 50% Injectable 25 Gram(s) IV Push once  dextrose 50% Injectable 12.5 Gram(s) IV Push once  dextrose 50% Injectable 25 Gram(s) IV Push once  glucagon  Injectable 1 milliGRAM(s) IntraMuscular once  heparin   Injectable 6500 Unit(s) IV Push every 6 hours PRN  heparin   Injectable 3000 Unit(s) IV Push every 6 hours PRN  heparin  Infusion.  Unit(s)/Hr IV Continuous <Continuous>  insulin lispro (ADMELOG) corrective regimen sliding scale   SubCutaneous every 6 hours  meropenem  IVPB 500 milliGRAM(s) IV Intermittent every 12 hours  midodrine 10 milliGRAM(s) Oral every 8 hours  ondansetron Injectable 4 milliGRAM(s) IV Push every 6 hours PRN  phenylephrine    Infusion 0.5 MICROgram(s)/kG/Min IV Continuous <Continuous>      GENERAL:  on high flow  HEENT:  NC/AT   NECK: supple no JVD  CVS:  +S1 S2 RRR  RESP: decreased bs  GI:  soft NT/ND +BS  : no suprapubic tenderness  MUSC:  no lower extremity edema  SKIN:  Warm, moist, no rashes   LYMPH: normal     MEDS REVIEWED	            ADVANCED DIRECTIVES:         DNR     DNI    MOLST    PSYCHOSOCIAL-SPIRITUAL ASSESSMENT:    _x__Reviewed     x___Care  plan unchanged     ___Care plan adjusted as below    GOALS OF CARE DISCUSSION  	x___Palliative care info/counseling provided	    ___Family meeting  	_x__Advanced Directives addressed	    _x__See previous Palliative Medicine Note    AGENCY CHOICE DISCUSSED:   ___HOSPICE   ___CALVARY  ___OTHER:              > 50% OF THE TIME SPENT IN COUNSELING AND COORDINATING CARE 	    Minutes:      PROLONGED SERVICE             FACE TO FACE:    PT            PT & FAMILY	       Minutes:      Advance Care Planning Time:

## 2020-12-24 NOTE — PROGRESS NOTE ADULT - ASSESSMENT
2.17.2020 This is an 88 M with dementia comes with COVID PNA. Patient requires help with ADLs. He lives with daughter and wife. His daughter is his HCP. Daughter reports that both her parents have advanced directives that state that they are a DNR/DNI. She would like to complete MOLST. MOLST was reviewed, witnessed and signed. DNR/DNI order entered into EMR.    12.18.2020 Patient still febrile and requiring oxygen. Poor po intake. On remdisivir and decadron.    12.19.2020 Afebrile x 24 hours. Continues to require oxygen.    12.20.2020 Transferred to ICU with acute on chronic hypoxic respiratory failure secondary to COVID PNA. Patient currently on BIPAP. Overall prognosis poor. Daughter aware. Patient remains a DNR/DNI.    12.21.2020 Cr worsening. Not candidate for HD. Off BIPAP. Tolerating high-flow. Overall prognosis poor.    12.22.2020 Tolerating high flow. On iv abx for superimposed infection. Prognosis poor.    12.23.2020 On high flow. For CT C/A/P. On iv abx for bacteremia, superimposed infection. Prognosis poor. Patient DNR/DNI.     12.24.2020 CT noted. Spoke to daughter. No improvement in patient's overall condition and overall prognosis remains poor. She will consider comfort measures only. She would like to discuss with her family.

## 2020-12-24 NOTE — PROGRESS NOTE ADULT - SUBJECTIVE AND OBJECTIVE BOX
Patient is a 88y old  Male who presents with a chief complaint of weakness, covid (21 Dec 2020 09:18)       HPI:  87 yo M with HTN DM HLD dementia (walks with cane) who p/w gen weakness x past several days. Patient has not been eating or drinking for the past couple of days. He started having fevers and chills today 101.4. Patient's wife returned home from [facility] [place] on 11/30. The daughter, who lives in the same house, works in a school. They all started to get sick after after wife got home on 11/30.   Pt was recently diagnosed with COVID as mult members in family have the same. Pt sent to hospital as famly can no longer care for this patient at home - pt was seen in ed yesterday for fall -- no acute findings -- and was sent home. Pt may have slid off chair today - no inj. Pt denies complaints. No abd pain. No acute dyspnea. No other acute co.  (14 Dec 2020 16:50)     No acute overnight events.     PAST MEDICAL & SURGICAL HISTORY:  Gout    Hyperlipidemia    Hypertension         FAMILY HISTORY:  NC    Social History:Non smoker    MEDICATIONS  (STANDING):  dexAMETHasone  Injectable 6 milliGRAM(s) IV Push daily  dextrose 40% Gel 15 Gram(s) Oral once  dextrose 5%. 1000 milliLiter(s) (50 mL/Hr) IV Continuous <Continuous>  dextrose 5%. 1000 milliLiter(s) (100 mL/Hr) IV Continuous <Continuous>  dextrose 50% Injectable 25 Gram(s) IV Push once  dextrose 50% Injectable 12.5 Gram(s) IV Push once  dextrose 50% Injectable 25 Gram(s) IV Push once  glucagon  Injectable 1 milliGRAM(s) IntraMuscular once  insulin lispro (ADMELOG) corrective regimen sliding scale   SubCutaneous every 6 hours  meropenem  IVPB 500 milliGRAM(s) IV Intermittent every 12 hours  phenylephrine    Infusion 0.5 MICROgram(s)/kG/Min (14.5 mL/Hr) IV Continuous <Continuous>  sodium bicarbonate  Infusion 0.195 mEq/kG/Hr (100 mL/Hr) IV Continuous <Continuous>    MEDICATIONS  (PRN):  ondansetron Injectable 4 milliGRAM(s) IV Push every 6 hours PRN Nausea and/or Vomiting   Meds reviewed    Allergies    penicillins (Unknown)  sulfa drugs (Unknown)    Intolerances         REVIEW OF SYSTEMS:    Limited due to respiratory status      ICU Vital Signs Last 24 Hrs  T(C): 36.2 (24 Dec 2020 08:08), Max: 37.2 (23 Dec 2020 22:12)  T(F): 97.2 (24 Dec 2020 08:08), Max: 99 (23 Dec 2020 22:12)  HR: 128 (24 Dec 2020 07:30) (51 - 131)  BP: 99/65 (24 Dec 2020 07:00) (78/45 - 152/72)  BP(mean): 78 (24 Dec 2020 07:00) (57 - 110)  ABP: --  ABP(mean): --  RR: 23 (24 Dec 2020 07:00) (18 - 48)  SpO2: 95% (24 Dec 2020 07:30) (80% - 97%)      PHYSICAL EXAM:    General: NAD  Deferred due to COVID 19 isolation and reduce transmission    LABS:                                   12.5   14.36 )-----------( 137      ( 23 Dec 2020 05:16 )             35.5     12-24    143  |  107  |  78<H>  ----------------------------<  219<H>  3.2<L>   |  25  |  2.30<H>    Ca    6.3<LL>      24 Dec 2020 06:08  Phos  3.7     12-24  Mg     2.2     12-24    TPro  4.1<L>  /  Alb  1.1<L>  /  TBili  1.3<H>  /  DBili  .70<H>  /  AST  32  /  ALT  30  /  AlkPhos  119  12-24    PTT - ( 24 Dec 2020 06:08 )  PTT:60.5 sec

## 2020-12-24 NOTE — PROGRESS NOTE ADULT - ASSESSMENT
88M with PMHx HTN, DM, HLD, dementia who presented to the hospital with weakness and fever. Recently dx'd with COVID.  Admitted to medical service with COVID PNA. Receiving steroids and Remdesivir.   Now with AF RVR, transferred to ICU for hypotension and tachycardiac    AF:  - has gotten pushes of amio, and has been on dilt, now off  - Would consider resuming amiodarone infusion to help maintain NSR.  Can also do PO load to 5g if patient able to tolerate PO  -AC with heparin gtt    HF:  - unknown LV function  - defer diuresis at this point given hypotension and pressor requirements   - monitor renal function and electrolytes  - check TTE    - stricts I&O's, daily weights    COVID  - BiPAP  - on remdesivir and decadron  - pulm/ICU input appreciated    AZALEA  - Cr worsening.   - strict I/Os, goal even to negative fluid balance,   - trend Cr      - Monitor and replete lytes, keep K>4 and Mg >2  - Further cardiac workup will depend on clinical course.   - All other workup per primary team

## 2020-12-24 NOTE — PROVIDER CONTACT NOTE (CRITICAL VALUE NOTIFICATION) - TEST AND RESULT REPORTED:
Lactate 10.0
ptt>200
Blood CULTURES 12/14: growth IN aerobic / anaerobic bottles Gram + cocci in clusters
Lactate 2.  2.2
blood culture 12/14  growth in the aerobic bottle gram positive cocci in pair and chains . gram positve cocci in cluster . growth in the anerobic bottle gram positive cocci in pairs
gram positive cocci in clusters in aerobic bottle
lactate 10.7
CA 6.3
APTT >200
Lactic 4.0
lactate 4.8
Serum glucose 797 and calcium 6.0

## 2020-12-25 LAB
ALBUMIN SERPL ELPH-MCNC: 1.3 G/DL — LOW (ref 3.3–5)
ALBUMIN SERPL ELPH-MCNC: 1.4 G/DL — LOW (ref 3.3–5)
ALP SERPL-CCNC: 156 U/L — HIGH (ref 40–120)
ALP SERPL-CCNC: 157 U/L — HIGH (ref 40–120)
ALT FLD-CCNC: 32 U/L — SIGNIFICANT CHANGE UP (ref 12–78)
ALT FLD-CCNC: 32 U/L — SIGNIFICANT CHANGE UP (ref 12–78)
ANION GAP SERPL CALC-SCNC: 9 MMOL/L — SIGNIFICANT CHANGE UP (ref 5–17)
APTT BLD: 47.4 SEC — HIGH (ref 27.5–35.5)
AST SERPL-CCNC: 30 U/L — SIGNIFICANT CHANGE UP (ref 15–37)
AST SERPL-CCNC: 32 U/L — SIGNIFICANT CHANGE UP (ref 15–37)
BASOPHILS # BLD AUTO: 0.03 K/UL — SIGNIFICANT CHANGE UP (ref 0–0.2)
BASOPHILS NFR BLD AUTO: 0.2 % — SIGNIFICANT CHANGE UP (ref 0–2)
BILIRUB DIRECT SERPL-MCNC: 0.9 MG/DL — HIGH (ref 0.05–0.2)
BILIRUB INDIRECT FLD-MCNC: 0.5 MG/DL — SIGNIFICANT CHANGE UP (ref 0.2–1)
BILIRUB SERPL-MCNC: 1.4 MG/DL — HIGH (ref 0.2–1.2)
BILIRUB SERPL-MCNC: 1.4 MG/DL — HIGH (ref 0.2–1.2)
BUN SERPL-MCNC: 94 MG/DL — HIGH (ref 7–23)
CALCIUM SERPL-MCNC: 7.5 MG/DL — LOW (ref 8.5–10.1)
CHLORIDE SERPL-SCNC: 106 MMOL/L — SIGNIFICANT CHANGE UP (ref 96–108)
CO2 SERPL-SCNC: 29 MMOL/L — SIGNIFICANT CHANGE UP (ref 22–31)
CREAT SERPL-MCNC: 2.4 MG/DL — HIGH (ref 0.5–1.3)
D DIMER BLD IA.RAPID-MCNC: 4881 NG/ML DDU — HIGH
EOSINOPHIL # BLD AUTO: 0 K/UL — SIGNIFICANT CHANGE UP (ref 0–0.5)
EOSINOPHIL NFR BLD AUTO: 0 % — SIGNIFICANT CHANGE UP (ref 0–6)
FIBRINOGEN PPP-MCNC: 762 MG/DL — HIGH (ref 290–520)
FUNGITELL: 157 PG/ML — HIGH
GLUCOSE SERPL-MCNC: 348 MG/DL — HIGH (ref 70–99)
HAPTOGLOB SERPL-MCNC: 176 MG/DL — SIGNIFICANT CHANGE UP (ref 34–200)
HCT VFR BLD CALC: 35.3 % — LOW (ref 39–50)
HCT VFR BLD CALC: 37.2 % — LOW (ref 39–50)
HGB BLD-MCNC: 12.1 G/DL — LOW (ref 13–17)
HGB BLD-MCNC: 12.6 G/DL — LOW (ref 13–17)
IMM GRANULOCYTES NFR BLD AUTO: 2 % — HIGH (ref 0–1.5)
LDH SERPL L TO P-CCNC: 839 U/L — HIGH (ref 50–242)
LYMPHOCYTES # BLD AUTO: 0.31 K/UL — LOW (ref 1–3.3)
LYMPHOCYTES # BLD AUTO: 2.5 % — LOW (ref 13–44)
MAGNESIUM SERPL-MCNC: 2.5 MG/DL — SIGNIFICANT CHANGE UP (ref 1.6–2.6)
MCHC RBC-ENTMCNC: 30 PG — SIGNIFICANT CHANGE UP (ref 27–34)
MCHC RBC-ENTMCNC: 30.1 PG — SIGNIFICANT CHANGE UP (ref 27–34)
MCHC RBC-ENTMCNC: 33.9 GM/DL — SIGNIFICANT CHANGE UP (ref 32–36)
MCHC RBC-ENTMCNC: 34.3 GM/DL — SIGNIFICANT CHANGE UP (ref 32–36)
MCV RBC AUTO: 87.8 FL — SIGNIFICANT CHANGE UP (ref 80–100)
MCV RBC AUTO: 88.6 FL — SIGNIFICANT CHANGE UP (ref 80–100)
MONOCYTES # BLD AUTO: 0.28 K/UL — SIGNIFICANT CHANGE UP (ref 0–0.9)
MONOCYTES NFR BLD AUTO: 2.3 % — SIGNIFICANT CHANGE UP (ref 2–14)
MRSA PCR RESULT.: SIGNIFICANT CHANGE UP
NEUTROPHILS # BLD AUTO: 11.32 K/UL — HIGH (ref 1.8–7.4)
NEUTROPHILS NFR BLD AUTO: 93 % — HIGH (ref 43–77)
NRBC # BLD: 0 /100 WBCS — SIGNIFICANT CHANGE UP (ref 0–0)
NRBC # BLD: 0 /100 WBCS — SIGNIFICANT CHANGE UP (ref 0–0)
PHOSPHATE SERPL-MCNC: 2.9 MG/DL — SIGNIFICANT CHANGE UP (ref 2.5–4.5)
PLATELET # BLD AUTO: 28 K/UL — LOW (ref 150–400)
PLATELET # BLD AUTO: 32 K/UL — LOW (ref 150–400)
POTASSIUM SERPL-MCNC: 4.3 MMOL/L — SIGNIFICANT CHANGE UP (ref 3.5–5.3)
POTASSIUM SERPL-SCNC: 4.3 MMOL/L — SIGNIFICANT CHANGE UP (ref 3.5–5.3)
PROT SERPL-MCNC: 5 G/DL — LOW (ref 6–8.3)
PROT SERPL-MCNC: 5 G/DL — LOW (ref 6–8.3)
RBC # BLD: 4.02 M/UL — LOW (ref 4.2–5.8)
RBC # BLD: 4.2 M/UL — SIGNIFICANT CHANGE UP (ref 4.2–5.8)
RBC # FLD: 13.6 % — SIGNIFICANT CHANGE UP (ref 10.3–14.5)
RBC # FLD: 14 % — SIGNIFICANT CHANGE UP (ref 10.3–14.5)
S AUREUS DNA NOSE QL NAA+PROBE: DETECTED
SODIUM SERPL-SCNC: 144 MMOL/L — SIGNIFICANT CHANGE UP (ref 135–145)
WBC # BLD: 12.18 K/UL — HIGH (ref 3.8–10.5)
WBC # BLD: 12.81 K/UL — HIGH (ref 3.8–10.5)
WBC # FLD AUTO: 12.18 K/UL — HIGH (ref 3.8–10.5)
WBC # FLD AUTO: 12.81 K/UL — HIGH (ref 3.8–10.5)

## 2020-12-25 PROCEDURE — 99291 CRITICAL CARE FIRST HOUR: CPT | Mod: CS

## 2020-12-25 PROCEDURE — 99291 CRITICAL CARE FIRST HOUR: CPT

## 2020-12-25 RX ORDER — SENNA PLUS 8.6 MG/1
2 TABLET ORAL AT BEDTIME
Refills: 0 | Status: DISCONTINUED | OUTPATIENT
Start: 2020-12-25 | End: 2020-12-27

## 2020-12-25 RX ORDER — INSULIN GLARGINE 100 [IU]/ML
10 INJECTION, SOLUTION SUBCUTANEOUS AT BEDTIME
Refills: 0 | Status: DISCONTINUED | OUTPATIENT
Start: 2020-12-26 | End: 2020-12-26

## 2020-12-25 RX ORDER — INSULIN GLARGINE 100 [IU]/ML
10 INJECTION, SOLUTION SUBCUTANEOUS ONCE
Refills: 0 | Status: COMPLETED | OUTPATIENT
Start: 2020-12-25 | End: 2020-12-25

## 2020-12-25 RX ORDER — POLYETHYLENE GLYCOL 3350 17 G/17G
17 POWDER, FOR SOLUTION ORAL
Refills: 0 | Status: DISCONTINUED | OUTPATIENT
Start: 2020-12-25 | End: 2020-12-27

## 2020-12-25 RX ADMIN — Medication 10 MILLIGRAM(S): at 18:13

## 2020-12-25 RX ADMIN — SENNA PLUS 2 TABLET(S): 8.6 TABLET ORAL at 23:05

## 2020-12-25 RX ADMIN — MIDODRINE HYDROCHLORIDE 10 MILLIGRAM(S): 2.5 TABLET ORAL at 06:46

## 2020-12-25 RX ADMIN — PHENYLEPHRINE HYDROCHLORIDE 14.5 MICROGRAM(S)/KG/MIN: 10 INJECTION INTRAVENOUS at 00:50

## 2020-12-25 RX ADMIN — MEROPENEM 100 MILLIGRAM(S): 1 INJECTION INTRAVENOUS at 18:12

## 2020-12-25 RX ADMIN — HEPARIN SODIUM 3000 UNIT(S): 5000 INJECTION INTRAVENOUS; SUBCUTANEOUS at 07:58

## 2020-12-25 RX ADMIN — Medication 6: at 23:12

## 2020-12-25 RX ADMIN — DEXMEDETOMIDINE HYDROCHLORIDE IN 0.9% SODIUM CHLORIDE 9.64 MICROGRAM(S)/KG/HR: 4 INJECTION INTRAVENOUS at 13:44

## 2020-12-25 RX ADMIN — INSULIN GLARGINE 10 UNIT(S): 100 INJECTION, SOLUTION SUBCUTANEOUS at 13:46

## 2020-12-25 RX ADMIN — Medication 2: at 18:13

## 2020-12-25 RX ADMIN — HEPARIN SODIUM 700 UNIT(S)/HR: 5000 INJECTION INTRAVENOUS; SUBCUTANEOUS at 07:54

## 2020-12-25 RX ADMIN — Medication 2: at 13:47

## 2020-12-25 RX ADMIN — CASPOFUNGIN ACETATE 260 MILLIGRAM(S): 7 INJECTION, POWDER, LYOPHILIZED, FOR SOLUTION INTRAVENOUS at 13:44

## 2020-12-25 RX ADMIN — MEROPENEM 100 MILLIGRAM(S): 1 INJECTION INTRAVENOUS at 06:46

## 2020-12-25 RX ADMIN — POLYETHYLENE GLYCOL 3350 17 GRAM(S): 17 POWDER, FOR SOLUTION ORAL at 13:46

## 2020-12-25 RX ADMIN — DEXMEDETOMIDINE HYDROCHLORIDE IN 0.9% SODIUM CHLORIDE 9.64 MICROGRAM(S)/KG/HR: 4 INJECTION INTRAVENOUS at 02:51

## 2020-12-25 RX ADMIN — MIDODRINE HYDROCHLORIDE 10 MILLIGRAM(S): 2.5 TABLET ORAL at 23:05

## 2020-12-25 RX ADMIN — POLYETHYLENE GLYCOL 3350 17 GRAM(S): 17 POWDER, FOR SOLUTION ORAL at 18:13

## 2020-12-25 RX ADMIN — MIDODRINE HYDROCHLORIDE 10 MILLIGRAM(S): 2.5 TABLET ORAL at 13:46

## 2020-12-25 RX ADMIN — Medication 10: at 00:56

## 2020-12-25 RX ADMIN — Medication 8: at 06:56

## 2020-12-25 RX ADMIN — PHENYLEPHRINE HYDROCHLORIDE 14.5 MICROGRAM(S)/KG/MIN: 10 INJECTION INTRAVENOUS at 13:45

## 2020-12-25 RX ADMIN — AMIODARONE HYDROCHLORIDE 16.7 MG/MIN: 400 TABLET ORAL at 02:53

## 2020-12-25 RX ADMIN — Medication 6 MILLIGRAM(S): at 06:46

## 2020-12-25 NOTE — PROGRESS NOTE ADULT - SUBJECTIVE AND OBJECTIVE BOX
Patient is a 88y old  Male who presents with a chief complaint of weakness, covid (25 Dec 2020 13:04)    HPI:  87 yo M with HTN DM HLD dementia (walks with cane) who p/w gen weakness x past several days. Patient has not been eating or drinking for the past couple of days. He started having fevers and chills today 101.4. Patient's wife returned home from [facility] on 11/30. The daughter, who lives in the same house, works in a school. They all started to get sick after after wife got home on 11/30.   Pt was recently diagnosed with COVID as mult members in family have the same. Pt sent to hospital as famly can no longer care for this patient at home - pt was seen in ed yesterday for fall -- no acute findings -- and was sent home. Pt may have slid off chair today - no inj. Pt denies complaints. No abd pain. No acute dyspnea. No other acute co.  (14 Dec 2020 16:50)    INTERVAL HPI/OVERNIGHT EVENTS:  Chart reviewed, notes reviewed.   Patient seen and examined.  Being followed by following specialists.     Consultant(s) Notes Reviewed:  [X] Yes    Care Discussed with Consultants/Other Providers: [X] Yes    REVIEW OF SYSTEMS:  CONSTITUTIONAL: No fever, weight loss, or fatigue  EYES: No eye pain, or discharge  ENMT: No sinus or throat pain  NECK: No pain or stiffness  BREASTS: No pain, masses, or nipple discharge  RESPIRATORY: No cough, wheezing, chills or hemoptysis; No shortness of breath  CARDIOVASCULAR: No chest pain, palpitations, dizziness, or leg swelling  GASTROINTESTINAL: No abdominal or epigastric pain. No nausea, vomiting.  GENITOURINARY: No dysuria, frequency, hematuria, or incontinence  NEUROLOGICAL: No loss of strength, numbness, or tremors  SKIN: No itching, burning, rashes, or lesions   LYMPH NODES: No enlarged glands  ENDOCRINE: No polydipsia or polyuria  MUSCULOSKELETAL: No muscle, back, or extremity pain  PSYCHIATRIC: No depression, anxiety, mood swings.  HEME/LYMPH: No easy bruising, or bleeding gums  ALLERGY AND IMMUNOLOGIC: No hives or eczema    Allergies    penicillins (Unknown)  sulfa drugs (Unknown)    Intolerances      Home Medications:  acetaminophen 325 mg oral tablet: 2 tab(s) orally every 6 hours, As needed, Temp greater or equal to 38C (100.4F), Mild Pain (1 - 3) (19 Dec 2020 19:56)  allopurinol 100 mg oral tablet: 1 tab(s) orally once a day (14 Dec 2020 15:22)  atenolol 25 mg oral tablet: 1 tab(s) orally once a day (14 Dec 2020 15:22)  atorvastatin 10 mg oral tablet: 1 tab(s) orally once a day (14 Dec 2020 15:22)  dexamethasone 6 mg oral tablet: 1 tab(s) orally once a day (19 Dec 2020 19:56)  dronabinol 2.5 mg oral capsule: 1 cap(s) orally 2 times a day (19 Dec 2020 19:56)  enoxaparin 40 mg/0.4 mL injectable solution:  injectable once a day (19 Dec 2020 19:56)  memantine 5 mg oral tablet: 1 tab(s) orally once a day (14 Dec 2020 15:22)  metFORMIN 500 mg oral tablet: 1 tab(s) orally 2 times a day (14 Dec 2020 15:22)    MEDICATIONS  (STANDING):  caspofungin IVPB      caspofungin IVPB 50 milliGRAM(s) IV Intermittent every 24 hours  dexMEDEtomidine Infusion 0.5 MICROgram(s)/kG/Hr (9.64 mL/Hr) IV Continuous <Continuous>  dextrose 40% Gel 15 Gram(s) Oral once  dextrose 5%. 1000 milliLiter(s) (50 mL/Hr) IV Continuous <Continuous>  dextrose 5%. 1000 milliLiter(s) (100 mL/Hr) IV Continuous <Continuous>  dextrose 50% Injectable 25 Gram(s) IV Push once  dextrose 50% Injectable 12.5 Gram(s) IV Push once  dextrose 50% Injectable 25 Gram(s) IV Push once  glucagon  Injectable 1 milliGRAM(s) IntraMuscular once  insulin lispro (ADMELOG) corrective regimen sliding scale   SubCutaneous every 6 hours  meropenem  IVPB 500 milliGRAM(s) IV Intermittent every 12 hours  midodrine 10 milliGRAM(s) Oral every 8 hours  phenylephrine    Infusion 0.5 MICROgram(s)/kG/Min (14.5 mL/Hr) IV Continuous <Continuous>  polyethylene glycol 3350 17 Gram(s) Oral two times a day  senna 2 Tablet(s) Oral at bedtime    MEDICATIONS  (PRN):  ondansetron Injectable 4 milliGRAM(s) IV Push every 6 hours PRN Nausea and/or Vomiting    Vital Signs Last 24 Hrs  T(C): 36.5 (25 Dec 2020 12:00), Max: 36.8 (24 Dec 2020 23:33)  T(F): 97.7 (25 Dec 2020 12:00), Max: 98.2 (24 Dec 2020 23:33)  HR: 99 (25 Dec 2020 21:30) (51 - 144)  BP: 136/62 (25 Dec 2020 21:00) (104/67 - 152/89)  BP(mean): 89 (25 Dec 2020 21:00) (71 - 112)  RR: 31 (25 Dec 2020 21:30) (20 - 38)  SpO2: 94% (25 Dec 2020 21:30) (74% - 100%)    PHYSICAL EXAM:  GENERAL: NAD, well-groomed, well-developed  HEAD:  Atraumatic, Normocephalic  EYES: EOMI, PERRLA, conjunctiva and sclera clear  ENMT: Moist mucous membranes, no lesions  NECK: Supple.  CHEST/LUNG: Clear to auscultation bilaterally; No rales, rhonchi, wheezing, or rubs  HEART: S1, S2.   ABDOMEN: Soft, Nontender, Nondistended; Bowel sounds present  EXTREMITIES:  2+ Peripheral Pulses, No clubbing, cyanosis, or edema  MS: No joint swelling or deformity.   LYMPH: No lymphadenopathy noted  SKIN: No rashes or lesions  NERVOUS SYSTEM:  No focal deficit.   PSYCH:  Awake and alert.   LABS:                         12.6   12.81 )-----------( 32       ( 25 Dec 2020 14:16 )             37.2     25 Dec 2020 05:47    144    |  106    |  94     ----------------------------<  348    4.3     |  29     |  2.40     Ca    7.5        25 Dec 2020 05:47  Phos  2.9       25 Dec 2020 05:47  Mg     2.5       25 Dec 2020 05:47    TPro  5.0    /  Alb  1.3    /  TBili  1.4    /  DBili  .90    /  AST  30     /  ALT  32     /  AlkPhos  156    25 Dec 2020 05:47    CAPILLARY BLOOD GLUCOSE      POCT Blood Glucose.: 170 mg/dL (25 Dec 2020 18:12)  POCT Blood Glucose.: 200 mg/dL (25 Dec 2020 13:43)  POCT Blood Glucose.: 303 mg/dL (25 Dec 2020 06:52)  POCT Blood Glucose.: 367 mg/dL (25 Dec 2020 00:48)    PTT - ( 25 Dec 2020 05:47 )  PTT:47.4 sec      Ferritin, Serum: 45397 ng/mL (12-20 @ 22:45)  Ferritin, Serum: 4391 ng/mL (12-20 @ 04:56)  Ferritin, Serum: 1748 ng/mL (12-18 @ 11:03)  Ferritin, Serum: 904 ng/mL (12-16 @ 15:28)  Ferritin, Serum: 664 ng/mL (12-15 @ 09:17)  Ferritin, Serum: 640 ng/mL (12-14 @ 17:41)    Thyroid Stimulating Hormone, Serum: 0.86 uIU/mL (12-20 @ 09:06)                  RADIOLOGY TEST: (IMAGES REVIEWED BY ME)    Imaging Personally Reviewed:  [X] YES      HEALTH ISSUES - PROBLEM Dx:  Atrial fibrillation  Atrial fibrillation    Respiratory failure  Respiratory failure    Hypokalemia  Hypokalemia    Hypotension  Hypotension    Pneumonia due to COVID-19 virus  Pneumonia due to COVID-19 virus    Gout  Gout    Hyperlipidemia, unspecified hyperlipidemia type  Hyperlipidemia, unspecified hyperlipidemia type    Type 2 diabetes mellitus with other specified complication, without long-term current use of insulin  Type 2 diabetes mellitus with other specified complication, without long-term current use of insulin    Essential hypertension  Essential hypertension    COVID-19  COVID-19         Patient is a 88y old  Male who presents with a chief complaint of weakness, covid (25 Dec 2020 13:04)    HPI:  87 yo M with HTN DM HLD dementia (walks with cane) who p/w gen weakness x past several days. Patient has not been eating or drinking for the past couple of days. He started having fevers and chills today 101.4. Patient's wife returned home from [facility] on 11/30. The daughter, who lives in the same house, works in a school. They all started to get sick after after wife got home on 11/30.   Pt was recently diagnosed with COVID as mult members in family have the same. Pt sent to hospital as famly can no longer care for this patient at home - pt was seen in ed yesterday for fall -- no acute findings -- and was sent home. Pt may have slid off chair today - no inj. Pt denies complaints. No abd pain. No acute dyspnea. No other acute co.  (14 Dec 2020 16:50)    INTERVAL HPI/OVERNIGHT EVENTS:  Chart reviewed, notes reviewed.   Patient seen and examined.  Being followed by following specialists: Pulmonary, ID, cardiology, nephrology,     Consultant(s) Notes Reviewed:  [X] Yes    Care Discussed with Consultants/Other Providers: [X] Yes    12/20/2020--> Events noted. Patient went in to atrial fibrillation with RVR. Rapid response was called and patient moved to ICU. Currently in ICU. Currently on BiPAP    12/23/2020 --> Remains very lethargic. Off and on BiPAP and HFNC. No acute events.     12/25/2020 --> On BiPAP intermittently. Still on pressors as needed. Platelets are decreasing. No overt bleeding.      REVIEW OF SYSTEMS:  Unable to obtain.       Allergies    penicillins (Unknown)  sulfa drugs (Unknown)    Intolerances      Home Medications:  acetaminophen 325 mg oral tablet: 2 tab(s) orally every 6 hours, As needed, Temp greater or equal to 38C (100.4F), Mild Pain (1 - 3) (19 Dec 2020 19:56)  allopurinol 100 mg oral tablet: 1 tab(s) orally once a day (14 Dec 2020 15:22)  atenolol 25 mg oral tablet: 1 tab(s) orally once a day (14 Dec 2020 15:22)  atorvastatin 10 mg oral tablet: 1 tab(s) orally once a day (14 Dec 2020 15:22)  dexamethasone 6 mg oral tablet: 1 tab(s) orally once a day (19 Dec 2020 19:56)  dronabinol 2.5 mg oral capsule: 1 cap(s) orally 2 times a day (19 Dec 2020 19:56)  enoxaparin 40 mg/0.4 mL injectable solution:  injectable once a day (19 Dec 2020 19:56)  memantine 5 mg oral tablet: 1 tab(s) orally once a day (14 Dec 2020 15:22)  metFORMIN 500 mg oral tablet: 1 tab(s) orally 2 times a day (14 Dec 2020 15:22)    MEDICATIONS  (STANDING):  caspofungin IVPB      caspofungin IVPB 50 milliGRAM(s) IV Intermittent every 24 hours  dexMEDEtomidine Infusion 0.5 MICROgram(s)/kG/Hr (9.64 mL/Hr) IV Continuous <Continuous>  dextrose 40% Gel 15 Gram(s) Oral once  dextrose 5%. 1000 milliLiter(s) (50 mL/Hr) IV Continuous <Continuous>  dextrose 5%. 1000 milliLiter(s) (100 mL/Hr) IV Continuous <Continuous>  dextrose 50% Injectable 25 Gram(s) IV Push once  dextrose 50% Injectable 12.5 Gram(s) IV Push once  dextrose 50% Injectable 25 Gram(s) IV Push once  glucagon  Injectable 1 milliGRAM(s) IntraMuscular once  insulin lispro (ADMELOG) corrective regimen sliding scale   SubCutaneous every 6 hours  meropenem  IVPB 500 milliGRAM(s) IV Intermittent every 12 hours  midodrine 10 milliGRAM(s) Oral every 8 hours  phenylephrine    Infusion 0.5 MICROgram(s)/kG/Min (14.5 mL/Hr) IV Continuous <Continuous>  polyethylene glycol 3350 17 Gram(s) Oral two times a day  senna 2 Tablet(s) Oral at bedtime    MEDICATIONS  (PRN):  ondansetron Injectable 4 milliGRAM(s) IV Push every 6 hours PRN Nausea and/or Vomiting    Vital Signs Last 24 Hrs  T(C): 36.5 (25 Dec 2020 12:00), Max: 36.8 (24 Dec 2020 23:33)  T(F): 97.7 (25 Dec 2020 12:00), Max: 98.2 (24 Dec 2020 23:33)  HR: 99 (25 Dec 2020 21:30) (51 - 144)  BP: 136/62 (25 Dec 2020 21:00) (104/67 - 152/89)  BP(mean): 89 (25 Dec 2020 21:00) (71 - 112)  RR: 31 (25 Dec 2020 21:30) (20 - 38)  SpO2: 94% (25 Dec 2020 21:30) (74% - 100%)    PHYSICAL EXAM:  GENERAL: on HFNC.   HEAD:  Atraumatic, Normocephalic  EYES: Pallor +  ENMT: Moist mucous membranes, no lesions  NECK: Supple.  CHEST/LUNG: Decreased breath sounds at bases, occasional rhonchi +  HEART: S1, S2.   ABDOMEN: Soft, Nontender, Nondistended; Bowel sounds present  EXTREMITIES:  2+ Peripheral Pulses,  MS: No joint swelling or deformity.   LYMPH: No lymphadenopathy noted  SKIN: No rashes or lesions  NERVOUS SYSTEM:  lethargic    LABS:                         12.6   12.81 )-----------( 32       ( 25 Dec 2020 14:16 )             37.2     25 Dec 2020 05:47    144    |  106    |  94     ----------------------------<  348    4.3     |  29     |  2.40     Ca    7.5        25 Dec 2020 05:47  Phos  2.9       25 Dec 2020 05:47  Mg     2.5       25 Dec 2020 05:47    TPro  5.0    /  Alb  1.3    /  TBili  1.4    /  DBili  .90    /  AST  30     /  ALT  32     /  AlkPhos  156    25 Dec 2020 05:47    CAPILLARY BLOOD GLUCOSE      POCT Blood Glucose.: 170 mg/dL (25 Dec 2020 18:12)  POCT Blood Glucose.: 200 mg/dL (25 Dec 2020 13:43)  POCT Blood Glucose.: 303 mg/dL (25 Dec 2020 06:52)  POCT Blood Glucose.: 367 mg/dL (25 Dec 2020 00:48)    PTT - ( 25 Dec 2020 05:47 )  PTT:47.4 sec      Ferritin, Serum: 77114 ng/mL (12-20 @ 22:45)  Ferritin, Serum: 4391 ng/mL (12-20 @ 04:56)  Ferritin, Serum: 1748 ng/mL (12-18 @ 11:03)  Ferritin, Serum: 904 ng/mL (12-16 @ 15:28)  Ferritin, Serum: 664 ng/mL (12-15 @ 09:17)  Ferritin, Serum: 640 ng/mL (12-14 @ 17:41)    Thyroid Stimulating Hormone, Serum: 0.86 uIU/mL (12-20 @ 09:06)                  RADIOLOGY TEST: (IMAGES REVIEWED BY ME)    Imaging Personally Reviewed:  [X] YES      HEALTH ISSUES - PROBLEM Dx:  Atrial fibrillation  Atrial fibrillation    Respiratory failure  Respiratory failure    Hypokalemia  Hypokalemia    Hypotension  Hypotension    Pneumonia due to COVID-19 virus  Pneumonia due to COVID-19 virus    Gout  Gout    Hyperlipidemia, unspecified hyperlipidemia type  Hyperlipidemia, unspecified hyperlipidemia type    Type 2 diabetes mellitus with other specified complication, without long-term current use of insulin  Type 2 diabetes mellitus with other specified complication, without long-term current use of insulin    Essential hypertension  Essential hypertension    COVID-19  COVID-19

## 2020-12-25 NOTE — PROGRESS NOTE ADULT - ASSESSMENT
87 yo M with HTN DM HLD dementia (walks with cane) who p/w gen weakness x past several days. Patient has not been eating or drinking for the past couple of days. He started having fevers and chills today 101.4. Patient's wife returned home from [facility] on 11/30. The daughter, who lives in the same house, works in a school. They all started to get sick after after wife got home on 11/30.   Pt was recently diagnosed with COVID as mult members in family have the same. Pt sent to hospital as famly can no longer care for this patient at home - pt was seen in ed yesterday for fall -- no acute findings -- and was sent home. Pt may have slid off chair today - no inj. Pt denies complaints. No abd pain. No acute dyspnea. No other acute co.  (14 Dec 2020 16:50)

## 2020-12-25 NOTE — PROGRESS NOTE ADULT - SUBJECTIVE AND OBJECTIVE BOX
[facility] DIVISION of INFECTIOUS DISEASE  Denver Lew MD PhD, Maxine Rodriguez MD, Anahy Syed MD, Jelena Regalado MD  and providing coverage with Katherine Martinez MD and Mala Bray MD  Providing Infectious Disease Consultations at [facility], [facility], [facility]'s      Office# [phone] to schedule follow up appointments  Answering Service for urgent calls or New Consults [phone]  Cell# to text for urgent issues Denver Lew [phone]     Infectious diseases progress note:    SPENCER LEVY is a 88y y. o. Male patient    COVID Patient    Allergies    penicillins (Unknown)  sulfa drugs (Unknown)    Intolerances        ANTIBIOTICS/RELEVANT:  antimicrobials  caspofungin IVPB      caspofungin IVPB 50 milliGRAM(s) IV Intermittent every 24 hours  meropenem  IVPB 500 milliGRAM(s) IV Intermittent every 12 hours    immunologic:    OTHER:  aMIOdarone Infusion 1 mG/Min IV Continuous <Continuous>  aMIOdarone Infusion 0.5 mG/Min IV Continuous <Continuous>  dexMEDEtomidine Infusion 0.5 MICROgram(s)/kG/Hr IV Continuous <Continuous>  dextrose 40% Gel 15 Gram(s) Oral once  dextrose 5%. 1000 milliLiter(s) IV Continuous <Continuous>  dextrose 5%. 1000 milliLiter(s) IV Continuous <Continuous>  dextrose 50% Injectable 25 Gram(s) IV Push once  dextrose 50% Injectable 12.5 Gram(s) IV Push once  dextrose 50% Injectable 25 Gram(s) IV Push once  glucagon  Injectable 1 milliGRAM(s) IntraMuscular once  heparin   Injectable 6500 Unit(s) IV Push every 6 hours PRN  heparin   Injectable 3000 Unit(s) IV Push every 6 hours PRN  heparin  Infusion.  Unit(s)/Hr IV Continuous <Continuous>  insulin lispro (ADMELOG) corrective regimen sliding scale   SubCutaneous every 6 hours  midodrine 10 milliGRAM(s) Oral every 8 hours  ondansetron Injectable 4 milliGRAM(s) IV Push every 6 hours PRN  phenylephrine    Infusion 0.5 MICROgram(s)/kG/Min IV Continuous <Continuous>      Objective:  Vital Signs Last 24 Hrs  T(C): 36.7 (25 Dec 2020 04:25), Max: 36.8 (24 Dec 2020 23:33)  T(F): 98.1 (25 Dec 2020 04:25), Max: 98.2 (24 Dec 2020 23:33)  HR: 64 (25 Dec 2020 07:52) (60 - 119)  BP: 131/- (25 Dec 2020 07:00) (87/61 - 152/89)  BP(mean): 92 (25 Dec 2020 07:00) (64 - 112)  RR: 28 (25 Dec 2020 07:00) (28 - 42)  SpO2: 83% (25 Dec 2020 07:52) (78% - 100%)    T(C): 36.7 (12-25-20 @ 04:25), Max: 37.2 (12-23-20 @ 22:12)  T(C): 36.7 (12-25-20 @ 04:25), Max: 37.2 (12-23-20 @ 22:12)  T(C): 36.7 (12-25-20 @ 04:25), Max: 37.2 (12-23-20 @ 22:12)    PHYSICAL EXAM: BIPAP MASK  HEENT: NC atraumatic  Neck: supple  Respiratory: no accessory muscle use, breathing comfortably  Cardiovascular: distant  Gastrointestinal: normal appearing, nondistended  Extremities: no clubbing, no cyanosis,      LABS:                          12.1   12.18 )-----------( 28       ( 25 Dec 2020 05:47 )             35.3       12.18 12-25 @ 05:47  17.65 12-24 @ 06:08  14.36 12-23 @ 05:16  11.83 12-22 @ 05:51  11.93 12-22 @ 00:39  9.44 12-21 @ 05:41  9.32 12-20 @ 13:38  10.06 12-19 @ 07:29      12-25    144  |  106  |  94<H>  ----------------------------<  348<H>  4.3   |  29  |  2.40<H>    Ca    7.5<L>      25 Dec 2020 05:47  Phos  2.9     12-25  Mg     2.5     12-25    TPro  5.0<L>  /  Alb  1.3<L>  /  TBili  1.4<H>  /  DBili  .90<H>  /  AST  30  /  ALT  32  /  AlkPhos  156<H>  12-25      Creatinine, Serum: 2.40 mg/dL (12-25-20 @ 05:47)  Creatinine, Serum: 2.30 mg/dL (12-24-20 @ 06:08)  Creatinine, Serum: 3.40 mg/dL (12-23-20 @ 05:16)  Creatinine, Serum: 3.40 mg/dL (12-22-20 @ 05:51)  Creatinine, Serum: 3.70 mg/dL (12-21-20 @ 10:23)  Creatinine, Serum: 3.20 mg/dL (12-21-20 @ 05:42)  Creatinine, Serum: 3.20 mg/dL (12-20-20 @ 09:06)  Creatinine, Serum: 1.70 mg/dL (12-19-20 @ 07:29)      PTT - ( 25 Dec 2020 05:47 )  PTT:47.4 sec          COVID RISK SCORE  Auto Neutrophil #: 11.32 K/uL (12-25-20 @ 05:47)  Auto Lymphocyte #: 0.31 K/uL (12-25-20 @ 05:47)  Auto Neutrophil #: 16.15 K/uL (12-24-20 @ 06:08)  Auto Lymphocyte #: 0.69 K/uL (12-24-20 @ 06:08)  Auto Neutrophil #: 13.22 K/uL (12-23-20 @ 05:16)  Auto Lymphocyte #: 0.60 K/uL (12-23-20 @ 05:16)  Auto Neutrophil #: 10.79 K/uL (12-22-20 @ 05:51)  Auto Lymphocyte #: 0.51 K/uL (12-22-20 @ 05:51)  Auto Neutrophil #: 8.09 K/uL (12-21-20 @ 05:41)  Auto Lymphocyte #: 0.38 K/uL (12-21-20 @ 05:41)  Auto Neutrophil #: 8.02 K/uL (12-20-20 @ 13:38)  Auto Lymphocyte #: 0.84 K/uL (12-20-20 @ 13:38)  Auto Neutrophil #: 8.92 K/uL (12-19-20 @ 07:29)  Auto Lymphocyte #: 0.65 K/uL (12-19-20 @ 07:29)  Auto Neutrophil #: 7.76 K/uL (12-18-20 @ 07:31)  Auto Lymphocyte #: 0.29 K/uL (12-18-20 @ 07:31)  Auto Neutrophil #: 4.13 K/uL (12-16-20 @ 10:13)  Auto Lymphocyte #: 0.50 K/uL (12-16-20 @ 10:13)  Auto Neutrophil #: 2.56 K/uL (12-15-20 @ 06:00)  Auto Lymphocyte #: 0.57 K/uL (12-15-20 @ 06:00)  Auto Neutrophil #: 3.47 K/uL (12-14-20 @ 12:44)  Auto Lymphocyte #: 0.46 K/uL (12-14-20 @ 12:44)    Lactate, Blood: 4.8 mmol/L (12-23-20 @ 05:16)  Lactate, Blood: 4.0 mmol/L (12-22-20 @ 19:50)  Lactate, Blood: 7.2 mmol/L (12-21-20 @ 10:31)  Lactate, Blood: 10.0 mmol/L (12-20-20 @ 16:43)  Lactate, Blood: 10.7 mmol/L (12-20-20 @ 11:02)  Lactate, Blood: 1.1 mmol/L (12-15-20 @ 06:00)  Lactate, Blood: 2.2 mmol/L (12-14-20 @ 12:44)    Auto Eosinophil #: 0.00 K/uL (12-25-20 @ 05:47)  Auto Eosinophil #: 0.00 K/uL (12-24-20 @ 06:08)  Auto Eosinophil #: 0.00 K/uL (12-23-20 @ 05:16)  Auto Eosinophil #: 0.00 K/uL (12-22-20 @ 05:51)  Auto Eosinophil #: 0.00 K/uL (12-21-20 @ 05:41)  Auto Eosinophil #: 0.00 K/uL (12-20-20 @ 13:38)  Auto Eosinophil #: 0.00 K/uL (12-19-20 @ 07:29)  Auto Eosinophil #: 0.00 K/uL (12-18-20 @ 07:31)  Auto Eosinophil #: 0.00 K/uL (12-16-20 @ 10:13)  Auto Eosinophil #: 0.00 K/uL (12-15-20 @ 06:00)  Auto Eosinophil #: 0.00 K/uL (12-14-20 @ 12:44)    Lactate Dehydrogenase, Serum: 882 U/L (12-20-20 @ 22:45)  Lactate Dehydrogenase, Serum: 426 U/L (12-16-20 @ 15:40)  Lactate Dehydrogenase, Serum: 265 U/L (12-15-20 @ 09:19)  Lactate Dehydrogenase, Serum: 285 U/L (12-14-20 @ 17:41)    Sedimentation Rate, Erythrocyte: 22 mm/hr (12-20-20 @ 01:24)  Sedimentation Rate, Erythrocyte: 25 mm/hr (12-15-20 @ 06:00)    Procalcitonin, Serum: 14.77 ng/mL (12-20-20 @ 16:43)  Procalcitonin, Serum: 1.54 ng/mL (12-20-20 @ 01:24)  Procalcitonin, Serum: 0.49 ng/mL (12-18-20 @ 07:31)  Procalcitonin, Serum: 0.13 ng/mL (12-15-20 @ 06:00)  Procalcitonin, Serum: 0.13 ng/mL (12-14-20 @ 12:44)    Troponin I, Serum: .015 ng/mL (12-14-20 @ 12:44)    Creatine Kinase, Serum: 298 U/L (12-20-20 @ 09:06)  Creatine Kinase, Serum: 253 U/L (12-15-20 @ 06:00)  Creatine Kinase, Serum: 273 U/L (12-14-20 @ 12:44)        Ferritin, Serum: 00023 ng/mL (12-20-20 @ 22:45)  Ferritin, Serum: 4391 ng/mL (12-20-20 @ 04:56)  Ferritin, Serum: 1748 ng/mL (12-18-20 @ 11:03)  Ferritin, Serum: 904 ng/mL (12-16-20 @ 15:28)  Ferritin, Serum: 664 ng/mL (12-15-20 @ 09:17)  Ferritin, Serum: 640 ng/mL (12-14-20 @ 17:41)        Activated Partial Thromboplastin Time: 47.4 sec (12-25-20 @ 05:47)  Activated Partial Thromboplastin Time: 60.5 sec (12-24-20 @ 06:08)  Activated Partial Thromboplastin Time: 66.1 sec (12-23-20 @ 13:32)  Activated Partial Thromboplastin Time: 85.7 sec (12-23-20 @ 05:16)  Activated Partial Thromboplastin Time: 199.9 sec (12-22-20 @ 19:50)  Activated Partial Thromboplastin Time: >200.0 sec (12-22-20 @ 09:26)  Activated Partial Thromboplastin Time: > 200 sec (12-22-20 @ 00:39)  INR: 1.45 ratio (12-21-20 @ 10:23)  Activated Partial Thromboplastin Time: 40.7 sec (12-21-20 @ 10:23)  INR: 1.08 ratio (12-20-20 @ 01:24)  Activated Partial Thromboplastin Time: 34.2 sec (12-20-20 @ 01:24)  Activated Partial Thromboplastin Time: 30.0 sec (12-14-20 @ 12:44)  INR: 1.06 ratio (12-14-20 @ 12:44)    D-Dimer Assay, Quantitative: 2980 ng/mL DDU (12-20-20 @ 16:43)  D-Dimer Assay, Quantitative: 1198 ng/mL DDU (12-18-20 @ 07:31)  D-Dimer Assay, Quantitative: 1774 ng/mL DDU (12-16-20 @ 10:13)  D-Dimer Assay, Quantitative: 931 ng/mL DDU (12-15-20 @ 06:00)  D-Dimer Assay, Quantitative: 3136 ng/mL DDU (12-14-20 @ 12:44)        MICROBIOLOGY:              RADIOLOGY & ADDITIONAL STUDIES:

## 2020-12-25 NOTE — PROGRESS NOTE ADULT - SUBJECTIVE AND OBJECTIVE BOX
Patient is a 88y old  Male who presents with a chief complaint of weakness, covid (25 Dec 2020 10:20)    24 hour events: on bipap, luann, precedex, heparin, amio   afebrile   worsening plt     REVIEW OF SYSTEMS: UTO    T(F): 98.1 (12-25-20 @ 04:25), Max: 98.2 (12-24-20 @ 23:33)  HR: 64 (12-25-20 @ 07:52) (60 - 119)  BP: 131/- (12-25-20 @ 07:00) (87/61 - 152/89)  RR: 28 (12-25-20 @ 07:00) (28 - 42)  SpO2: 83% (12-25-20 @ 07:52) (78% - 100%)  Wt(kg): --      CAPILLARY BLOOD GLUCOSE      POCT Blood Glucose.: 303 mg/dL (25 Dec 2020 06:52)  POCT Blood Glucose.: 367 mg/dL (25 Dec 2020 00:48)  POCT Blood Glucose.: 363 mg/dL (24 Dec 2020 16:54)  POCT Blood Glucose.: 351 mg/dL (24 Dec 2020 16:52)  POCT Blood Glucose.: 247 mg/dL (24 Dec 2020 11:19)    I&O's Summary    12-24 @ 07:01  -  12-25 @ 07:00  --------------------------------------------------------  IN: 2970.6 mL / OUT: 2000 mL / NET: 970.6 mL      PHYSICAL EXAM  General: on bipap, NAD  CNS: not awake but opens eyes to voice, touch, does not follow commands, moves all extremities   HEENT: pupils reactive b/l   Resp: clear mostly with scattered rhonchi  CVS: S1S2, regular, vanessa   Abd: soft, mild diffuse tenderness, +BS  Ext: no LE edema, +UE edema   Skin: warm     MEDICATIONS  caspofungin IVPB   caspofungin IVPB IV Intermittent  meropenem  IVPB IV Intermittent    aMIOdarone Infusion IV Continuous  aMIOdarone Infusion IV Continuous  midodrine Oral  phenylephrine    Infusion IV Continuous    dextrose 40% Gel Oral  dextrose 50% Injectable IV Push  dextrose 50% Injectable IV Push  dextrose 50% Injectable IV Push  glucagon  Injectable IntraMuscular  insulin lispro (ADMELOG) corrective regimen sliding scale SubCutaneous      dexMEDEtomidine Infusion IV Continuous  ondansetron Injectable IV Push PRN      heparin   Injectable IV Push PRN  heparin   Injectable IV Push PRN  heparin  Infusion. IV Continuous        dextrose 5%. IV Continuous  dextrose 5%. IV Continuous                                12.1   12.18 )-----------( 28       ( 25 Dec 2020 05:47 )             35.3       12-25    144  |  106  |  94<H>  ----------------------------<  348<H>  4.3   |  29  |  2.40<H>    Ca    7.5<L>      25 Dec 2020 05:47  Phos  2.9     12-25  Mg     2.5     12-25    TPro  5.0<L>  /  Alb  1.3<L>  /  TBili  1.4<H>  /  DBili  .90<H>  /  AST  30  /  ALT  32  /  AlkPhos  156<H>  12-25          PTT - ( 25 Dec 2020 05:47 )  PTT:47.4 sec    .Blood Blood   No growth to date. -- 12-21 @ 00:30    GLOBAL ISSUE/BEST PRACTICE  Analgesia: NA  Sedation: Y  CAM-ICU: UTO  HOB elevation: yes  Stress ulcer prophylaxis: NA  VTE prophylaxis: Y  Glycemic control: Y  Nutrition: Y    CODE STATUS: DNR, DNI  GOC discussion: Y

## 2020-12-25 NOTE — PROGRESS NOTE ADULT - SUBJECTIVE AND OBJECTIVE BOX
Date/Time Patient Seen:  		  Referring MD:   Data Reviewed	       Patient is a 88y old  Male who presents with a chief complaint of weakness, covid (24 Dec 2020 16:47)      Subjective/HPI     PAST MEDICAL & SURGICAL HISTORY:  Gout    Hyperlipidemia    Hypertension          Medication list         MEDICATIONS  (STANDING):  aMIOdarone Infusion 1 mG/Min (33.3 mL/Hr) IV Continuous <Continuous>  aMIOdarone Infusion 0.5 mG/Min (16.7 mL/Hr) IV Continuous <Continuous>  caspofungin IVPB      caspofungin IVPB 50 milliGRAM(s) IV Intermittent every 24 hours  dexMEDEtomidine Infusion 0.5 MICROgram(s)/kG/Hr (9.64 mL/Hr) IV Continuous <Continuous>  dextrose 40% Gel 15 Gram(s) Oral once  dextrose 5%. 1000 milliLiter(s) (50 mL/Hr) IV Continuous <Continuous>  dextrose 5%. 1000 milliLiter(s) (100 mL/Hr) IV Continuous <Continuous>  dextrose 50% Injectable 25 Gram(s) IV Push once  dextrose 50% Injectable 12.5 Gram(s) IV Push once  dextrose 50% Injectable 25 Gram(s) IV Push once  glucagon  Injectable 1 milliGRAM(s) IntraMuscular once  heparin  Infusion.  Unit(s)/Hr (14 mL/Hr) IV Continuous <Continuous>  insulin lispro (ADMELOG) corrective regimen sliding scale   SubCutaneous every 6 hours  meropenem  IVPB 500 milliGRAM(s) IV Intermittent every 12 hours  midodrine 10 milliGRAM(s) Oral every 8 hours  phenylephrine    Infusion 0.5 MICROgram(s)/kG/Min (14.5 mL/Hr) IV Continuous <Continuous>    MEDICATIONS  (PRN):  heparin   Injectable 6500 Unit(s) IV Push every 6 hours PRN For aPTT less than 40  heparin   Injectable 3000 Unit(s) IV Push every 6 hours PRN For aPTT between 40 - 57  ondansetron Injectable 4 milliGRAM(s) IV Push every 6 hours PRN Nausea and/or Vomiting         Vitals log        ICU Vital Signs Last 24 Hrs  T(C): 36.7 (25 Dec 2020 04:25), Max: 36.8 (24 Dec 2020 23:33)  T(F): 98.1 (25 Dec 2020 04:25), Max: 98.2 (24 Dec 2020 23:33)  HR: 66 (25 Dec 2020 07:00) (60 - 128)  BP: 131/- (25 Dec 2020 07:00) (84/61 - 152/89)  BP(mean): 92 (25 Dec 2020 07:00) (64 - 112)  ABP: --  ABP(mean): --  RR: 28 (25 Dec 2020 07:00) (27 - 42)  SpO2: 83% (25 Dec 2020 07:00) (78% - 100%)           Input and Output:  I&O's Detail    24 Dec 2020 07:01  -  25 Dec 2020 07:00  --------------------------------------------------------  IN:    Amiodarone: 367 mL    Dexmedetomidine: 93.6 mL    Heparin Infusion: 115 mL    IV PiggyBack: 100 mL    IV PiggyBack: 100 mL    IV PiggyBack: 250 mL    IV PiggyBack: 250 mL    Phenylephrine: 875 mL    Pivot 1.5: 820 mL  Total IN: 2970.6 mL    OUT:    Indwelling Catheter - Urethral (mL): 2000 mL  Total OUT: 2000 mL    Total NET: 970.6 mL          Lab Data                        12.1   12.18 )-----------( 28       ( 25 Dec 2020 05:47 )             35.3     12-25    144  |  106  |  94<H>  ----------------------------<  348<H>  4.3   |  29  |  2.40<H>    Ca    7.5<L>      25 Dec 2020 05:47  Phos  2.9     12-25  Mg     2.5     12-25    TPro  5.0<L>  /  Alb  1.3<L>  /  TBili  1.4<H>  /  DBili  .90<H>  /  AST  30  /  ALT  32  /  AlkPhos  156<H>  12-25            Review of Systems	      Objective     Physical Examination    heart s1s2  lung dec BS      Pertinent Lab findings & Imaging      Tariq:  NO   Adequate UO     I&O's Detail    24 Dec 2020 07:01  -  25 Dec 2020 07:00  --------------------------------------------------------  IN:    Amiodarone: 367 mL    Dexmedetomidine: 93.6 mL    Heparin Infusion: 115 mL    IV PiggyBack: 100 mL    IV PiggyBack: 100 mL    IV PiggyBack: 250 mL    IV PiggyBack: 250 mL    Phenylephrine: 875 mL    Pivot 1.5: 820 mL  Total IN: 2970.6 mL    OUT:    Indwelling Catheter - Urethral (mL): 2000 mL  Total OUT: 2000 mL    Total NET: 970.6 mL               Discussed with:     Cultures:	        Radiology

## 2020-12-25 NOTE — PROGRESS NOTE ADULT - SUBJECTIVE AND OBJECTIVE BOX
Follow up: resp failure    HPI:  89 yo M with HTN DM HLD dementia (walks with cane) who p/w gen weakness x past several days. Patient has not been eating or drinking for the past couple of days. He started having fevers and chills today 101.4. Patient's wife returned home from [facility] on 11/30. The daughter, who lives in the same house, works in a school. They all started to get sick after after wife got home on 11/30.   Pt was recently diagnosed with COVID as mult members in family have the same. Pt sent to hospital as famly can no longer care for this patient at home - pt was seen in ed yesterday for fall -- no acute findings -- and was sent home. Pt may have slid off chair today - no inj. Pt denies complaints. No abd pain. No acute dyspnea. No other acute co.  (14 Dec 2020 16:50)    No new cardiac events overnight    PAST MEDICAL & SURGICAL HISTORY:  Gout    Hyperlipidemia    Hypertension        MEDICATIONS  (STANDING):  caspofungin IVPB      caspofungin IVPB 50 milliGRAM(s) IV Intermittent every 24 hours  dexMEDEtomidine Infusion 0.5 MICROgram(s)/kG/Hr (9.64 mL/Hr) IV Continuous <Continuous>  dextrose 40% Gel 15 Gram(s) Oral once  dextrose 5%. 1000 milliLiter(s) (50 mL/Hr) IV Continuous <Continuous>  dextrose 5%. 1000 milliLiter(s) (100 mL/Hr) IV Continuous <Continuous>  dextrose 50% Injectable 25 Gram(s) IV Push once  dextrose 50% Injectable 12.5 Gram(s) IV Push once  dextrose 50% Injectable 25 Gram(s) IV Push once  glucagon  Injectable 1 milliGRAM(s) IntraMuscular once  insulin lispro (ADMELOG) corrective regimen sliding scale   SubCutaneous every 6 hours  meropenem  IVPB 500 milliGRAM(s) IV Intermittent every 12 hours  midodrine 10 milliGRAM(s) Oral every 8 hours  phenylephrine    Infusion 0.5 MICROgram(s)/kG/Min (14.5 mL/Hr) IV Continuous <Continuous>  polyethylene glycol 3350 17 Gram(s) Oral two times a day  senna 2 Tablet(s) Oral at bedtime    MEDICATIONS  (PRN):  ondansetron Injectable 4 milliGRAM(s) IV Push every 6 hours PRN Nausea and/or Vomiting      REVIEW OF SYSTEMS: unobtainable    Vital Signs Last 24 Hrs  T(C): 36.5 (25 Dec 2020 12:00), Max: 36.8 (24 Dec 2020 23:33)  T(F): 97.7 (25 Dec 2020 12:00), Max: 98.2 (24 Dec 2020 23:33)  HR: 52 (25 Dec 2020 13:00) (51 - 71)  BP: 116/57 (25 Dec 2020 13:00) (87/61 - 152/89)  BP(mean): 80 (25 Dec 2020 13:00) (64 - 112)  RR: 24 (25 Dec 2020 13:00) (24 - 38)  SpO2: 100% (25 Dec 2020 13:00) (74% - 100%)    I&O's Summary    24 Dec 2020 07:01  -  25 Dec 2020 07:00  --------------------------------------------------------  IN: 2970.6 mL / OUT: 2000 mL / NET: 970.6 mL        PHYSICAL EXAM:    Constitutional: intubated  Eyes:  Pupils round, no lesions  ENMT: ETT  Pulmonary: scattered rhonchi  Cardiovascular: PMI not palpable RRR normal S1 and S2, no murmurs, rubs, gallops or clicks  Gastrointestinal: Bowel Sounds present, soft, nontender.   Lymph: No cervical lymphadenopathy.  Neurological:  no focal deficits  Skin: No rashes.  No cyanosis.  Psych:  cannot assess   Ext: No lower ext edema                                12.1   12.18 )-----------( 28       ( 25 Dec 2020 05:47 )             35.3     12-25    144  |  106  |  94<H>  ----------------------------<  348<H>  4.3   |  29  |  2.40<H>    Ca    7.5<L>      25 Dec 2020 05:47  Phos  2.9     12-25  Mg     2.5     12-25    TPro  5.0<L>  /  Alb  1.3<L>  /  TBili  1.4<H>  /  DBili  .90<H>  /  AST  30  /  ALT  32  /  AlkPhos  156<H>  12-25      < from: 12 Lead ECG (12.22.20 @ 08:48) >    Ventricular Rate 85 BPM    Atrial Rate 85 BPM    P-R Interval 126 ms    QRS Duration 82 ms    Q-T Interval 404 ms    QTC Calculation(Bazett) 480 ms    P Axis 83 degrees    R Axis 67 degrees    T Axis 96 degrees    Diagnosis Line Normal sinus rhythm  Low voltage QRS  Possible Lateral infarct , age undetermined  Abnormal ECG  When compared with ECG of 20-DEC-2020 01:24,  Sinus rhythm has replaced Atrial fibrillation  Vent. rate has decreased BY  75 BPM  Borderline criteria for Lateral infarct arenow present  Confirmed by Denver Dillon MD ([id number]) on 12/22/2020 1:11:02 PM    < end of copied text >  < from: CT Chest w/ Oral Cont (12.23.20 @ 16:48) >    EXAM:  CT ABDOMEN AND PELVIS OC                          EXAM:  CT CHEST OC                            PROCEDURE DATE:  12/23/2020          INTERPRETATION:  CLINICAL INFORMATION: Leukocytosis, abdominal pain. Covid 19 pneumonia.    COMPARISON: CT abdomen/pelvis July 20, 2015    PROCEDURE:  CT of the Chest, Abdomen and Pelvis was performed without intravenous contrast.  Intravenous contrast: None.  Oral contrast: Positive contrast was administered.  Sagittal and coronal reformats were performed.    FINDINGS:  CHEST:  LUNGS AND LARGE AIRWAYS: Patent central airways. Severe bilateral groundglass and more solid airspace opacities.  PLEURA: Small bilateral pleural effusions, left side greater than right. Calcified bilateral pleural plaques.  VESSELS: Atherosclerotic changes of the aorta and coronary arteries.  HEART: Heart size is normal. No pericardial effusion.  MEDIASTINUM AND MALI: No lymphadenopathy.  CHEST WALL AND LOWER NECK: Within normal limits.    ABDOMEN AND PELVIS:  LIVER: Withinnormal limits.  BILE DUCTS: Normal caliber.  GALLBLADDER: Within normal limits.  SPLEEN: Within normal limits.  PANCREAS: Moderate fatty atrophy.  ADRENALS: Within normal limits.  KIDNEYS/URETERS: No hydronephrosis. Left renal cysts.    BLADDER: Potter catheter balloon within the urinary bladder lumen.  REPRODUCTIVE ORGANS: Prostate within normal limits.    BOWEL: No bowel obstruction, wall thickening or inflammatory change. Appendix not identified  PERITONEUM: Small volume ascites.  VESSELS: Atherosclerotic changes.  RETROPERITONEUM/LYMPH NODES: No lymphadenopathy.  ABDOMINAL WALL: Within normal limits.  BONES: Mild L2 wedge compression deformity, not significantly changed.    IMPRESSION:  Severe diffuse bilateral airspace disease compatible with pneumonia.  Small bilateral pleural effusions.  Small amount of ascites. No acute abdominal pathology identified.              MAYCO PRASAD MD; Attending Radiologist  This document has been electronically signed. Dec 23 2020  5:03PM    < end of copied text >

## 2020-12-25 NOTE — PROGRESS NOTE ADULT - SUBJECTIVE AND OBJECTIVE BOX
Patient is a 88y old  Male who presents with a chief complaint of weakness, covid (18 Dec 2020 10:34)      Subjective: Patient seen    PAIN: N  DYSPNEA: N	  NAUS/VOM: 	N  SECRETIONS: 	N  AGITATION: N    OTHER REVIEW OF SYSTEMS: negative    Vital Signs Last 24 Hrs  T(C): 36.5 (25 Dec 2020 12:00), Max: 36.8 (24 Dec 2020 23:33)  T(F): 97.7 (25 Dec 2020 12:00), Max: 98.2 (24 Dec 2020 23:33)  HR: 53 (25 Dec 2020 12:40) (51 - 95)  BP: 110/71 (25 Dec 2020 12:00) (87/61 - 152/89)  BP(mean): 86 (25 Dec 2020 12:00) (64 - 112)  RR: 31 (25 Dec 2020 12:00) (26 - 38)  SpO2: 100% (25 Dec 2020 12:40) (74% - 100%)    12-25    144  |  106  |  94<H>  ----------------------------<  348<H>  4.3   |  29  |  2.40<H>    Ca    7.5<L>      25 Dec 2020 05:47  Phos  2.9     12-25  Mg     2.5     12-25    TPro  5.0<L>  /  Alb  1.3<L>  /  TBili  1.4<H>  /  DBili  .90<H>  /  AST  30  /  ALT  32  /  AlkPhos  156<H>  12-25                          12.1   12.18 )-----------( 28       ( 25 Dec 2020 05:47 )             35.3     PTT - ( 25 Dec 2020 05:47 )  PTT:47.4 sec  CAPILLARY BLOOD GLUCOSE      POCT Blood Glucose.: 303 mg/dL (25 Dec 2020 06:52)  POCT Blood Glucose.: 367 mg/dL (25 Dec 2020 00:48)  POCT Blood Glucose.: 363 mg/dL (24 Dec 2020 16:54)  POCT Blood Glucose.: 351 mg/dL (24 Dec 2020 16:52)              caspofungin IVPB      caspofungin IVPB 50 milliGRAM(s) IV Intermittent every 24 hours  dexMEDEtomidine Infusion 0.5 MICROgram(s)/kG/Hr IV Continuous <Continuous>  dextrose 40% Gel 15 Gram(s) Oral once  dextrose 5%. 1000 milliLiter(s) IV Continuous <Continuous>  dextrose 5%. 1000 milliLiter(s) IV Continuous <Continuous>  dextrose 50% Injectable 25 Gram(s) IV Push once  dextrose 50% Injectable 12.5 Gram(s) IV Push once  dextrose 50% Injectable 25 Gram(s) IV Push once  glucagon  Injectable 1 milliGRAM(s) IntraMuscular once  insulin glargine Injectable (LANTUS) 10 Unit(s) SubCutaneous once  insulin lispro (ADMELOG) corrective regimen sliding scale   SubCutaneous every 6 hours  meropenem  IVPB 500 milliGRAM(s) IV Intermittent every 12 hours  midodrine 10 milliGRAM(s) Oral every 8 hours  ondansetron Injectable 4 milliGRAM(s) IV Push every 6 hours PRN  phenylephrine    Infusion 0.5 MICROgram(s)/kG/Min IV Continuous <Continuous>  polyethylene glycol 3350 17 Gram(s) Oral two times a day  senna 2 Tablet(s) Oral at bedtime      GENERAL:  on high flow  HEENT:  NC/AT   NECK: supple no JVD  CVS:  +S1 S2 RRR  RESP: decreased bs  GI:  soft NT/ND +BS  : no suprapubic tenderness  MUSC:  no lower extremity edema  SKIN:  Warm, moist, no rashes   LYMPH: normal     MEDS REVIEWED	            ADVANCED DIRECTIVES:         DNR     DNI    MOLST    PSYCHOSOCIAL-SPIRITUAL ASSESSMENT:    _x__Reviewed     x___Care  plan unchanged     ___Care plan adjusted as below    GOALS OF CARE DISCUSSION  	x___Palliative care info/counseling provided	    ___Family meeting  	_x__Advanced Directives addressed	    _x__See previous Palliative Medicine Note    AGENCY CHOICE DISCUSSED:   ___HOSPICE   ___CALVARY  ___OTHER:              > 50% OF THE TIME SPENT IN COUNSELING AND COORDINATING CARE 	    Minutes:      PROLONGED SERVICE             FACE TO FACE:    PT            PT & FAMILY	       Minutes:      Advance Care Planning Time:

## 2020-12-25 NOTE — PROGRESS NOTE ADULT - SUBJECTIVE AND OBJECTIVE BOX
Patient is a 88y old  Male who presents with a chief complaint of weakness, covid (21 Dec 2020 09:18)       HPI:  89 yo M with HTN DM HLD dementia (walks with cane) who p/w gen weakness x past several days. Patient has not been eating or drinking for the past couple of days. He started having fevers and chills today 101.4. Patient's wife returned home from [facility] on 11/30. The daughter, who lives in the same house, works in a school. They all started to get sick after after wife got home on 11/30.   Pt was recently diagnosed with COVID as mult members in family have the same. Pt sent to hospital as famly can no longer care for this patient at home - pt was seen in ed yesterday for fall -- no acute findings -- and was sent home. Pt may have slid off chair today - no inj. Pt denies complaints. No abd pain. No acute dyspnea. No other acute co.  (14 Dec 2020 16:50)     No acute overnight events.     PAST MEDICAL & SURGICAL HISTORY:  Gout    Hyperlipidemia    Hypertension         FAMILY HISTORY:  NC    Social History:Non smoker    MEDICATIONS  (STANDING):  dexAMETHasone  Injectable 6 milliGRAM(s) IV Push daily  dextrose 40% Gel 15 Gram(s) Oral once  dextrose 5%. 1000 milliLiter(s) (50 mL/Hr) IV Continuous <Continuous>  dextrose 5%. 1000 milliLiter(s) (100 mL/Hr) IV Continuous <Continuous>  dextrose 50% Injectable 25 Gram(s) IV Push once  dextrose 50% Injectable 12.5 Gram(s) IV Push once  dextrose 50% Injectable 25 Gram(s) IV Push once  glucagon  Injectable 1 milliGRAM(s) IntraMuscular once  insulin lispro (ADMELOG) corrective regimen sliding scale   SubCutaneous every 6 hours  meropenem  IVPB 500 milliGRAM(s) IV Intermittent every 12 hours  phenylephrine    Infusion 0.5 MICROgram(s)/kG/Min (14.5 mL/Hr) IV Continuous <Continuous>  sodium bicarbonate  Infusion 0.195 mEq/kG/Hr (100 mL/Hr) IV Continuous <Continuous>    MEDICATIONS  (PRN):  ondansetron Injectable 4 milliGRAM(s) IV Push every 6 hours PRN Nausea and/or Vomiting   Meds reviewed    Allergies    penicillins (Unknown)  sulfa drugs (Unknown)    Intolerances         REVIEW OF SYSTEMS:    Limited due to respiratory status      ICU Vital Signs Last 24 Hrs  T(C): 36.5 (25 Dec 2020 12:00), Max: 36.8 (24 Dec 2020 23:33)  T(F): 97.7 (25 Dec 2020 12:00), Max: 98.2 (24 Dec 2020 23:33)  HR: 53 (25 Dec 2020 12:40) (51 - 114)  BP: 110/71 (25 Dec 2020 12:00) (87/61 - 152/89)  BP(mean): 86 (25 Dec 2020 12:00) (64 - 112)  ABP: --  ABP(mean): --  RR: 31 (25 Dec 2020 12:00) (26 - 42)  SpO2: 100% (25 Dec 2020 12:40) (74% - 100%)        PHYSICAL EXAM:    General: NAD  Deferred due to COVID 19 isolation and reduce transmission    LABS:                                   12.1   12.18 )-----------( 28       ( 25 Dec 2020 05:47 )             35.3     12-25    144  |  106  |  94<H>  ----------------------------<  348<H>  4.3   |  29  |  2.40<H>    Ca    7.5<L>      25 Dec 2020 05:47  Phos  2.9     12-25  Mg     2.5     12-25    TPro  5.0<L>  /  Alb  1.3<L>  /  TBili  1.4<H>  /  DBili  .90<H>  /  AST  30  /  ALT  32  /  AlkPhos  156<H>  12-25    PTT - ( 25 Dec 2020 05:47 )  PTT:47.4 sec

## 2020-12-25 NOTE — PROGRESS NOTE ADULT - ASSESSMENT
AZALEA on CKD stage 3  COVID 19+  Metabolic Acidosis    -BLCR 1.4  -AZALEA 2/2 hypoxemic injury  -Urine lytes reviewed  -UA 30 protein mod blood  -IVF as ordered  -Renal indices worsening, Cont to monitor   -Monitor urine output  -Strict &Os  -Pressors per ICU  -Patient DNR/DNI  -No acute indication for dialysis; poor candidate given age and comorbidities        d/w ICU

## 2020-12-25 NOTE — PROGRESS NOTE ADULT - ASSESSMENT
2.17.2020 This is an 88 M with dementia comes with COVID PNA. Patient requires help with ADLs. He lives with daughter and wife. His daughter is his HCP. Daughter reports that both her parents have advanced directives that state that they are a DNR/DNI. She would like to complete MOLST. MOLST was reviewed, witnessed and signed. DNR/DNI order entered into EMR.    12.18.2020 Patient still febrile and requiring oxygen. Poor po intake. On remdisivir and decadron.    12.19.2020 Afebrile x 24 hours. Continues to require oxygen.    12.20.2020 Transferred to ICU with acute on chronic hypoxic respiratory failure secondary to COVID PNA. Patient currently on BIPAP. Overall prognosis poor. Daughter aware. Patient remains a DNR/DNI.    12.21.2020 Cr worsening. Not candidate for HD. Off BIPAP. Tolerating high-flow. Overall prognosis poor.    12.22.2020 Tolerating high flow. On iv abx for superimposed infection. Prognosis poor.    12.23.2020 On high flow. For CT C/A/P. On iv abx for bacteremia, superimposed infection. Prognosis poor. Patient DNR/DNI.     12.24.2020 CT noted. Spoke to daughter. No improvement in patient's overall condition and overall prognosis remains poor. She will consider comfort measures only. She would like to discuss with her family.    12.25.2020 No improvement. Family considering comfort care.

## 2020-12-25 NOTE — PROGRESS NOTE ADULT - ASSESSMENT
Patient is a 87 yo M with HTN DM HLD dementia (walks with cane) who p/w gen weakness x past several days. fever, recently diagnosed with COVID sent to hospital as family can no longer care for this patient at home - 12/14 first COVID+ PCR    RECOMMENDATIONS  12/14 NLR 3.47/0.46=7.5 97% on RA, would NOT start steroid as pt sats fine, rec LMWH prophylactic dose, with no hypoxemia and unclear duration rec NO remdesivir  blood with what appears to be a contaminant, urine with <100k enterococcus  12/15 agree with stopping steroids, continue LMWH   12/16 NLR 4/0.5=8 blood cultures with what appear to be contaminant no further Rx, on RA, continued fevers  12/17 now on NC-3.5L some concerns for secondary process will send off further testing, STEROIDs started  12/18 NC-3L, meeting criteria so REMDESIVIR started  12/19 NLR 8.92/0.65=13.7, NC 4L, continue remdesivir, steroids, lovenox, monitor CBC with diff, inflammatory markers  12/20 noted to be hypothermic today with rapid afiba and worsening CXR, transferred to ICU, placed on BIPAP. Lactic acid ~10, LFTs now elevated with AST >5x ULN and Cr 3.2 with GFR <30 - stopped remdesivir. Send for inflammatory markers and CBC with diff - will follow to evaluate for possible Tocilizumab. Send blood cultures. Start empirically on MEROPENEM . FPR 62591/[id number], bandemia 19%, worsening CXR  12/21 NLR 8.09/0.38=21.3, prior enterococcus in urine on 12/14, rec sending sputum, urine, follow blood cultures, suspect secondary bacterial infection, send fungitell, aspergillus galactamanan  blood cultures NGTD  12/22 NLR 10.79/0.5=21 concern for secondary bacterial infection, send fungitell, aspergillus galactamanan  12/23 NLR 13.22/0.60=22, down to high flow NC, rising lactate, would add CASPOFUNGIN     FUNGITELL 187  12/24 on BIPAP, cont current Rx, Fungitell, Asp Ag pending  12/25 dropping wbc, nut NLR 11.3/0.3=38, with high fungitell continue current Rx

## 2020-12-26 LAB
ALBUMIN SERPL ELPH-MCNC: 1.5 G/DL — LOW (ref 3.3–5)
ALP SERPL-CCNC: 174 U/L — HIGH (ref 40–120)
ALT FLD-CCNC: 30 U/L — SIGNIFICANT CHANGE UP (ref 12–78)
ANION GAP SERPL CALC-SCNC: 6 MMOL/L — SIGNIFICANT CHANGE UP (ref 5–17)
AST SERPL-CCNC: 26 U/L — SIGNIFICANT CHANGE UP (ref 15–37)
BILIRUB DIRECT SERPL-MCNC: 0.8 MG/DL — HIGH (ref 0.05–0.2)
BILIRUB INDIRECT FLD-MCNC: 0.7 MG/DL — SIGNIFICANT CHANGE UP (ref 0.2–1)
BILIRUB SERPL-MCNC: 1.5 MG/DL — HIGH (ref 0.2–1.2)
BUN SERPL-MCNC: 100 MG/DL — HIGH (ref 7–23)
CALCIUM SERPL-MCNC: 7.6 MG/DL — LOW (ref 8.5–10.1)
CHLORIDE SERPL-SCNC: 111 MMOL/L — HIGH (ref 96–108)
CO2 SERPL-SCNC: 31 MMOL/L — SIGNIFICANT CHANGE UP (ref 22–31)
CREAT SERPL-MCNC: 2.3 MG/DL — HIGH (ref 0.5–1.3)
CULTURE RESULTS: SIGNIFICANT CHANGE UP
GLUCOSE SERPL-MCNC: 262 MG/DL — HIGH (ref 70–99)
HCT VFR BLD CALC: 37.9 % — LOW (ref 39–50)
HEPARIN-PF4 AB RESULT: <0.6 U/ML — SIGNIFICANT CHANGE UP (ref 0–0.9)
HGB BLD-MCNC: 12.7 G/DL — LOW (ref 13–17)
MAGNESIUM SERPL-MCNC: 2.5 MG/DL — SIGNIFICANT CHANGE UP (ref 1.6–2.6)
MCHC RBC-ENTMCNC: 30 PG — SIGNIFICANT CHANGE UP (ref 27–34)
MCHC RBC-ENTMCNC: 33.5 GM/DL — SIGNIFICANT CHANGE UP (ref 32–36)
MCV RBC AUTO: 89.6 FL — SIGNIFICANT CHANGE UP (ref 80–100)
NRBC # BLD: 0 /100 WBCS — SIGNIFICANT CHANGE UP (ref 0–0)
PF4 HEPARIN CMPLX AB SER-ACNC: NEGATIVE — SIGNIFICANT CHANGE UP
PHOSPHATE SERPL-MCNC: 3.6 MG/DL — SIGNIFICANT CHANGE UP (ref 2.5–4.5)
PLATELET # BLD AUTO: 29 K/UL — LOW (ref 150–400)
POTASSIUM SERPL-MCNC: 4.7 MMOL/L — SIGNIFICANT CHANGE UP (ref 3.5–5.3)
POTASSIUM SERPL-SCNC: 4.7 MMOL/L — SIGNIFICANT CHANGE UP (ref 3.5–5.3)
PROT SERPL-MCNC: 5.5 G/DL — LOW (ref 6–8.3)
RBC # BLD: 4.23 M/UL — SIGNIFICANT CHANGE UP (ref 4.2–5.8)
RBC # FLD: 14 % — SIGNIFICANT CHANGE UP (ref 10.3–14.5)
SODIUM SERPL-SCNC: 148 MMOL/L — HIGH (ref 135–145)
SPECIMEN SOURCE: SIGNIFICANT CHANGE UP
WBC # BLD: 13.54 K/UL — HIGH (ref 3.8–10.5)
WBC # FLD AUTO: 13.54 K/UL — HIGH (ref 3.8–10.5)

## 2020-12-26 PROCEDURE — 99291 CRITICAL CARE FIRST HOUR: CPT | Mod: CS

## 2020-12-26 PROCEDURE — 99291 CRITICAL CARE FIRST HOUR: CPT

## 2020-12-26 RX ORDER — HEPARIN SODIUM 5000 [USP'U]/ML
5000 INJECTION INTRAVENOUS; SUBCUTANEOUS EVERY 8 HOURS
Refills: 0 | Status: DISCONTINUED | OUTPATIENT
Start: 2020-12-26 | End: 2020-12-27

## 2020-12-26 RX ORDER — INSULIN GLARGINE 100 [IU]/ML
20 INJECTION, SOLUTION SUBCUTANEOUS AT BEDTIME
Refills: 0 | Status: DISCONTINUED | OUTPATIENT
Start: 2020-12-26 | End: 2020-12-27

## 2020-12-26 RX ORDER — AMIODARONE HYDROCHLORIDE 400 MG/1
150 TABLET ORAL ONCE
Refills: 0 | Status: COMPLETED | OUTPATIENT
Start: 2020-12-26 | End: 2020-12-26

## 2020-12-26 RX ORDER — AMIODARONE HYDROCHLORIDE 400 MG/1
200 TABLET ORAL DAILY
Refills: 0 | Status: DISCONTINUED | OUTPATIENT
Start: 2020-12-26 | End: 2020-12-27

## 2020-12-26 RX ORDER — AMIODARONE HYDROCHLORIDE 400 MG/1
0.5 TABLET ORAL
Qty: 900 | Refills: 0 | Status: DISCONTINUED | OUTPATIENT
Start: 2020-12-26 | End: 2020-12-27

## 2020-12-26 RX ORDER — MAGNESIUM SULFATE 500 MG/ML
1 VIAL (ML) INJECTION ONCE
Refills: 0 | Status: COMPLETED | OUTPATIENT
Start: 2020-12-26 | End: 2020-12-26

## 2020-12-26 RX ORDER — MIDODRINE HYDROCHLORIDE 2.5 MG/1
20 TABLET ORAL EVERY 8 HOURS
Refills: 0 | Status: DISCONTINUED | OUTPATIENT
Start: 2020-12-26 | End: 2020-12-27

## 2020-12-26 RX ADMIN — DEXMEDETOMIDINE HYDROCHLORIDE IN 0.9% SODIUM CHLORIDE 9.64 MICROGRAM(S)/KG/HR: 4 INJECTION INTRAVENOUS at 21:56

## 2020-12-26 RX ADMIN — AMIODARONE HYDROCHLORIDE 200 MILLIGRAM(S): 400 TABLET ORAL at 08:28

## 2020-12-26 RX ADMIN — MIDODRINE HYDROCHLORIDE 20 MILLIGRAM(S): 2.5 TABLET ORAL at 21:55

## 2020-12-26 RX ADMIN — DEXMEDETOMIDINE HYDROCHLORIDE IN 0.9% SODIUM CHLORIDE 9.64 MICROGRAM(S)/KG/HR: 4 INJECTION INTRAVENOUS at 10:21

## 2020-12-26 RX ADMIN — PHENYLEPHRINE HYDROCHLORIDE 14.5 MICROGRAM(S)/KG/MIN: 10 INJECTION INTRAVENOUS at 18:32

## 2020-12-26 RX ADMIN — AMIODARONE HYDROCHLORIDE 618 MILLIGRAM(S): 400 TABLET ORAL at 09:44

## 2020-12-26 RX ADMIN — Medication 8: at 05:25

## 2020-12-26 RX ADMIN — Medication 12: at 18:34

## 2020-12-26 RX ADMIN — DEXMEDETOMIDINE HYDROCHLORIDE IN 0.9% SODIUM CHLORIDE 9.64 MICROGRAM(S)/KG/HR: 4 INJECTION INTRAVENOUS at 18:58

## 2020-12-26 RX ADMIN — DEXMEDETOMIDINE HYDROCHLORIDE IN 0.9% SODIUM CHLORIDE 9.64 MICROGRAM(S)/KG/HR: 4 INJECTION INTRAVENOUS at 03:16

## 2020-12-26 RX ADMIN — Medication 10: at 12:34

## 2020-12-26 RX ADMIN — POLYETHYLENE GLYCOL 3350 17 GRAM(S): 17 POWDER, FOR SOLUTION ORAL at 18:32

## 2020-12-26 RX ADMIN — PHENYLEPHRINE HYDROCHLORIDE 14.5 MICROGRAM(S)/KG/MIN: 10 INJECTION INTRAVENOUS at 04:05

## 2020-12-26 RX ADMIN — MEROPENEM 100 MILLIGRAM(S): 1 INJECTION INTRAVENOUS at 18:31

## 2020-12-26 RX ADMIN — MIDODRINE HYDROCHLORIDE 20 MILLIGRAM(S): 2.5 TABLET ORAL at 13:20

## 2020-12-26 RX ADMIN — DEXMEDETOMIDINE HYDROCHLORIDE IN 0.9% SODIUM CHLORIDE 9.64 MICROGRAM(S)/KG/HR: 4 INJECTION INTRAVENOUS at 14:01

## 2020-12-26 RX ADMIN — POLYETHYLENE GLYCOL 3350 17 GRAM(S): 17 POWDER, FOR SOLUTION ORAL at 05:25

## 2020-12-26 RX ADMIN — MEROPENEM 100 MILLIGRAM(S): 1 INJECTION INTRAVENOUS at 05:25

## 2020-12-26 RX ADMIN — DEXMEDETOMIDINE HYDROCHLORIDE IN 0.9% SODIUM CHLORIDE 9.64 MICROGRAM(S)/KG/HR: 4 INJECTION INTRAVENOUS at 00:26

## 2020-12-26 RX ADMIN — SENNA PLUS 2 TABLET(S): 8.6 TABLET ORAL at 22:45

## 2020-12-26 RX ADMIN — MIDODRINE HYDROCHLORIDE 10 MILLIGRAM(S): 2.5 TABLET ORAL at 05:25

## 2020-12-26 RX ADMIN — Medication 100 GRAM(S): at 09:45

## 2020-12-26 RX ADMIN — HEPARIN SODIUM 5000 UNIT(S): 5000 INJECTION INTRAVENOUS; SUBCUTANEOUS at 21:55

## 2020-12-26 RX ADMIN — INSULIN GLARGINE 20 UNIT(S): 100 INJECTION, SOLUTION SUBCUTANEOUS at 21:54

## 2020-12-26 RX ADMIN — CASPOFUNGIN ACETATE 260 MILLIGRAM(S): 7 INJECTION, POWDER, LYOPHILIZED, FOR SOLUTION INTRAVENOUS at 13:20

## 2020-12-26 RX ADMIN — AMIODARONE HYDROCHLORIDE 16.7 MG/MIN: 400 TABLET ORAL at 12:30

## 2020-12-26 NOTE — PROGRESS NOTE ADULT - SUBJECTIVE AND OBJECTIVE BOX
[facility] - Alethea Christian, Mary Anne, Santana, Rolly, Duy Regalado  Office Number:  [phone]    Patient resting comfortably in bed in NAD.  Laying flat with no respiratory distress.  bouts of af overnight    ROS: negative unless otherwise mentioned.    Telemetry:  sr, af with rvr    MEDICATIONS  (STANDING):  aMIOdarone    Tablet 200 milliGRAM(s) Oral daily  caspofungin IVPB      caspofungin IVPB 50 milliGRAM(s) IV Intermittent every 24 hours  dexMEDEtomidine Infusion 0.5 MICROgram(s)/kG/Hr (9.64 mL/Hr) IV Continuous <Continuous>  dextrose 40% Gel 15 Gram(s) Oral once  dextrose 5%. 1000 milliLiter(s) (50 mL/Hr) IV Continuous <Continuous>  dextrose 5%. 1000 milliLiter(s) (100 mL/Hr) IV Continuous <Continuous>  dextrose 50% Injectable 25 Gram(s) IV Push once  dextrose 50% Injectable 12.5 Gram(s) IV Push once  dextrose 50% Injectable 25 Gram(s) IV Push once  glucagon  Injectable 1 milliGRAM(s) IntraMuscular once  insulin glargine Injectable (LANTUS) 10 Unit(s) SubCutaneous at bedtime  insulin lispro (ADMELOG) corrective regimen sliding scale   SubCutaneous every 6 hours  meropenem  IVPB 500 milliGRAM(s) IV Intermittent every 12 hours  midodrine 10 milliGRAM(s) Oral every 8 hours  phenylephrine    Infusion 0.5 MICROgram(s)/kG/Min (14.5 mL/Hr) IV Continuous <Continuous>  polyethylene glycol 3350 17 Gram(s) Oral two times a day  senna 2 Tablet(s) Oral at bedtime    MEDICATIONS  (PRN):  ondansetron Injectable 4 milliGRAM(s) IV Push every 6 hours PRN Nausea and/or Vomiting      Allergies    penicillins (Unknown)  sulfa drugs (Unknown)    Intolerances        Vital Signs Last 24 Hrs  T(C): 37 (26 Dec 2020 04:36), Max: 37 (26 Dec 2020 00:37)  T(F): 98.6 (26 Dec 2020 04:36), Max: 98.6 (26 Dec 2020 00:37)  HR: 70 (26 Dec 2020 07:00) (51 - 144)  BP: 126/71 (26 Dec 2020 06:00) (94/55 - 136/70)  BP(mean): 94 (26 Dec 2020 06:00) (70 - 100)  RR: 29 (26 Dec 2020 07:54) (20 - 38)  SpO2: 93% (26 Dec 2020 07:54) (74% - 100%)    I&O's Summary    25 Dec 2020 07:01  -  26 Dec 2020 07:00  --------------------------------------------------------  IN: 1450.1 mL / OUT: 1240 mL / NET: 210.1 mL        ON EXAM:    Constitutional: extubated, comfortable  Eyes:  Pupils round, no lesions  Pulmonary: scattered rhonchi  Cardiovascular: PMI not palpable RRR normal S1 and S2, no murmurs, rubs, gallops or clicks  Gastrointestinal: Bowel Sounds present, soft, nontender.   Lymph: No cervical lymphadenopathy.  Neurological:  no focal deficits  Skin: No rashes.  No cyanosis.  Psych:  cannot assess   Ext: No lower ext edema  LABS: All Labs Reviewed:                        12.7   13.54 )-----------( 29       ( 26 Dec 2020 06:49 )             37.9                         12.6   12.81 )-----------( 32       ( 25 Dec 2020 14:16 )             37.2                         12.1   12.18 )-----------( 28       ( 25 Dec 2020 05:47 )             35.3     26 Dec 2020 06:49    148    |  111    |  100    ----------------------------<  262    4.7     |  31     |  2.30   25 Dec 2020 05:47    144    |  106    |  94     ----------------------------<  348    4.3     |  29     |  2.40   24 Dec 2020 06:08    143    |  107    |  78     ----------------------------<  219    3.2     |  25     |  2.30     Ca    7.6        26 Dec 2020 06:49  Ca    7.5        25 Dec 2020 05:47  Ca    6.3        24 Dec 2020 06:08  Phos  3.6       26 Dec 2020 06:49  Phos  2.9       25 Dec 2020 05:47  Phos  3.7       24 Dec 2020 06:08  Mg     2.5       26 Dec 2020 06:49  Mg     2.5       25 Dec 2020 05:47  Mg     2.2       24 Dec 2020 06:08    TPro  5.5    /  Alb  1.5    /  TBili  1.5    /  DBili  .80    /  AST  26     /  ALT  30     /  AlkPhos  174    26 Dec 2020 06:49  TPro  5.0    /  Alb  1.3    /  TBili  1.4    /  DBili  .90    /  AST  30     /  ALT  32     /  AlkPhos  156    25 Dec 2020 05:47  TPro  4.1    /  Alb  1.1    /  TBili  1.3    /  DBili  .70    /  AST  32     /  ALT  30     /  AlkPhos  119    24 Dec 2020 06:08    PTT - ( 25 Dec 2020 05:47 )  PTT:47.4 sec      Blood Culture:

## 2020-12-26 NOTE — PROVIDER CONTACT NOTE (OTHER) - ASSESSMENT
Pt asymptomatic, denies any palpitations, dizziness
poct glucose 444, repeat 440.  MD made aware. 12 units given as per sliding scale.  Repeat 436.  MD made aware
Pt A&Ox2. Able to reorient pt however pt forgetful.  No s/s of acute distress, SOB.

## 2020-12-26 NOTE — PROGRESS NOTE ADULT - SUBJECTIVE AND OBJECTIVE BOX
Patient is a 88y old  Male who presents with a chief complaint of weakness, covid (25 Dec 2020 17:00)    24 hour events: ***    REVIEW OF SYSTEMS  Constitutional: No fever, chills, fatigue  Neuro: No headache, numbness, weakness  Resp: No cough, wheezing, shortness of breath  CVS: No chest pain, palpitations, leg swelling  GI: No abdominal pain, nausea, vomiting, diarrhea   : No dysuria, frequency, incontinence  Skin: No itching, burning, rashes, or lesions   Msk: No joint pain or swelling  Psych: No depression, anxiety, mood swings  Heme: No bleeding    T(F): 98.6 (12-26-20 @ 04:36), Max: 98.6 (12-26-20 @ 00:37)  HR: 70 (12-26-20 @ 07:00) (51 - 144)  BP: 126/71 (12-26-20 @ 06:00) (94/55 - 136/70)  RR: 29 (12-26-20 @ 07:00) (20 - 38)  SpO2: 93% (12-26-20 @ 07:00) (74% - 100%)  Wt(kg): --            I&O's Summary    12-25 @ 07:01  -  12-26 @ 07:00  --------------------------------------------------------  IN: 1450.1 mL / OUT: 1240 mL / NET: 210.1 mL      PHYSICAL EXAM  General:   CNS:   HEENT:   Resp:   CVS:   Abd:   Ext:   Skin:     MEDICATIONS  caspofungin IVPB   caspofungin IVPB IV Intermittent  meropenem  IVPB IV Intermittent    midodrine Oral  phenylephrine    Infusion IV Continuous    dextrose 40% Gel Oral  dextrose 50% Injectable IV Push  dextrose 50% Injectable IV Push  dextrose 50% Injectable IV Push  glucagon  Injectable IntraMuscular  insulin glargine Injectable (LANTUS) SubCutaneous  insulin lispro (ADMELOG) corrective regimen sliding scale SubCutaneous      dexMEDEtomidine Infusion IV Continuous  ondansetron Injectable IV Push PRN        polyethylene glycol 3350 Oral  senna Oral      dextrose 5%. IV Continuous  dextrose 5%. IV Continuous                                12.7   13.54 )-----------( 29       ( 26 Dec 2020 06:49 )             37.9       12-26    148<H>  |  111<H>  |  100<H>  ----------------------------<  262<H>  4.7   |  31  |  2.30<H>    Ca    7.6<L>      26 Dec 2020 06:49  Phos  3.6     12-26  Mg     2.5     12-26    TPro  5.5<L>  /  Alb  1.5<L>  /  TBili  1.5<H>  /  DBili  .80<H>  /  AST  26  /  ALT  30  /  AlkPhos  174<H>  12-26          PTT - ( 25 Dec 2020 05:47 )  PTT:47.4 sec          Radiology: ***  Bedside lung ultrasound: ***  Bedside ECHO: ***    CENTRAL LINE: Y/N          DATE INSERTED:              REMOVE: Y/N  MADRIGAL: Y/N                        DATE INSERTED:              REMOVE: Y/N  A-LINE: Y/N                       DATE INSERTED:              REMOVE: Y/N    GLOBAL ISSUE/BEST PRACTICE  Analgesia:   Sedation:   CAM-ICU:   HOB elevation: yes  Stress ulcer prophylaxis:   VTE prophylaxis:   Glycemic control:   Nutrition:     CODE STATUS: ***  [facility] discussion: Y       Patient is a 88y old  Male who presents with a chief complaint of weakness, covid (25 Dec 2020 17:00)    24 hour events: Wore bipap overnight. Pt went into rapid a-fib when agitated overnight, converted back to NSR without intervention. However, this AM pt went into rapid a-fib sustaining 140s, given amio and Mg. Amio infusion was then started. A-fib broke and pt returned to NSR. Pt required being placed back on bipap during the morning.     REVIEW OF SYSTEMS: Unable to obtain due to clinical condition.     T(F): 98.6 (12-26-20 @ 04:36), Max: 98.6 (12-26-20 @ 00:37)  HR: 70 (12-26-20 @ 07:00) (51 - 144)  BP: 126/71 (12-26-20 @ 06:00) (94/55 - 136/70)  RR: 29 (12-26-20 @ 07:00) (20 - 38)  SpO2: 93% (12-26-20 @ 07:00) (74% - 100%)  Wt(kg): --            I&O's Summary    12-25 @ 07:01  -  12-26 @ 07:00  --------------------------------------------------------  IN: 1450.1 mL / OUT: 1240 mL / NET: 210.1 mL      PHYSICAL EXAM  General: on bipap, NAD  CNS: awake but refuses to open eyes, does not follow commands, moves all extremities   Resp: clear mostly with scattered rhonchi  CVS: irregular, tachy, S1S2  Abd: soft, mild diffuse tenderness, +BS  Ext: no LE edema  Skin: warm     MEDICATIONS  caspofungin IVPB   caspofungin IVPB IV Intermittent  meropenem  IVPB IV Intermittent    midodrine Oral  phenylephrine    Infusion IV Continuous    dextrose 40% Gel Oral  dextrose 50% Injectable IV Push  dextrose 50% Injectable IV Push  dextrose 50% Injectable IV Push  glucagon  Injectable IntraMuscular  insulin glargine Injectable (LANTUS) SubCutaneous  insulin lispro (ADMELOG) corrective regimen sliding scale SubCutaneous      dexMEDEtomidine Infusion IV Continuous  ondansetron Injectable IV Push PRN        polyethylene glycol 3350 Oral  senna Oral      dextrose 5%. IV Continuous  dextrose 5%. IV Continuous                                12.7   13.54 )-----------( 29       ( 26 Dec 2020 06:49 )             37.9       12-26    148<H>  |  111<H>  |  100<H>  ----------------------------<  262<H>  4.7   |  31  |  2.30<H>    Ca    7.6<L>      26 Dec 2020 06:49  Phos  3.6     12-26  Mg     2.5     12-26    TPro  5.5<L>  /  Alb  1.5<L>  /  TBili  1.5<H>  /  DBili  .80<H>  /  AST  26  /  ALT  30  /  AlkPhos  174<H>  12-26          PTT - ( 25 Dec 2020 05:47 )  PTT:47.4 sec        GLOBAL ISSUE/BEST PRACTICE  Analgesia: NA  Sedation: Y  CAM-ICU: UTO  HOB elevation: yes  Stress ulcer prophylaxis: NA  VTE prophylaxis: Y  Glycemic control: Y  Nutrition: Y    CODE STATUS: DNR, DNI  GOC discussion: Y

## 2020-12-26 NOTE — CHART NOTE - NSCHARTNOTEFT_GEN_A_CORE
Assessment: Pt seen in ICU for nutrition follow-up. As per chart pt is a 88 year old male with a PMH of  HTN, DM, HLD, dementia who presented with weakness and fever. Recently dx'd with COVID.  Admitted to medical service with COVID PNA. Progression of hypoxemia requiring more O2, got a-fib with rvr and given lopressor and amio, had hypotension and tachycardia and RRT was called, pt started on IV amio and HFNC. Now alternating between bipap and HFNC.    Pt seen in ICU. Pt continues on HFNC and BiPAP overnight. Pt continues on sedative and pressor. Per chart pt with poor prognosis, noted that pt's family is likely to opt for comfort care. Pt started on TF of Pivot 1.5 ordered for 65ml x 20hrs, currently running at goal rate of 65ml/hr, tolerating well. No GI distress noted at this time, +BM 12/25.     Factors impacting intake: [ ] none [ ] nausea  [ ] vomiting [ ] diarrhea [ ] constipation  [ ]chewing problems [ ] swallowing issues  [x ] other: tenuous respiratory status     Diet Presciption: Diet, NPO with Tube Feed:   Tube Feeding Modality: Nasogastric  Pivot 1.5  Total Volume for 24 Hours (mL): 1300  Continuous  Starting Tube Feed Rate {mL per Hour}: 20  Increase Tube Feed Rate by (mL): 10     Every 6 hours  Until Goal Tube Feed Rate (mL per Hour): 65  Tube Feed Duration (in Hours): 20  Tube Feed Start Time: 17:30 (12-23-20 @ 17:29)    Intake: n/a     Current Weight: no updated weight in chart  Previous Weight: (12/20) 176.3lbs   % Weight Change    Pertinent Medications: MEDICATIONS  (STANDING):  aMIOdarone    Tablet 200 milliGRAM(s) Oral daily  aMIOdarone Infusion 0.5 mG/Min (16.7 mL/Hr) IV Continuous <Continuous>  caspofungin IVPB      caspofungin IVPB 50 milliGRAM(s) IV Intermittent every 24 hours  dexMEDEtomidine Infusion 0.5 MICROgram(s)/kG/Hr (9.64 mL/Hr) IV Continuous <Continuous>  dextrose 40% Gel 15 Gram(s) Oral once  dextrose 5%. 1000 milliLiter(s) (50 mL/Hr) IV Continuous <Continuous>  dextrose 5%. 1000 milliLiter(s) (100 mL/Hr) IV Continuous <Continuous>  dextrose 50% Injectable 25 Gram(s) IV Push once  dextrose 50% Injectable 12.5 Gram(s) IV Push once  dextrose 50% Injectable 25 Gram(s) IV Push once  glucagon  Injectable 1 milliGRAM(s) IntraMuscular once  insulin glargine Injectable (LANTUS) 10 Unit(s) SubCutaneous at bedtime  insulin lispro (ADMELOG) corrective regimen sliding scale   SubCutaneous every 6 hours  meropenem  IVPB 500 milliGRAM(s) IV Intermittent every 12 hours  midodrine 20 milliGRAM(s) Oral every 8 hours  phenylephrine    Infusion 0.5 MICROgram(s)/kG/Min (14.5 mL/Hr) IV Continuous <Continuous>  polyethylene glycol 3350 17 Gram(s) Oral two times a day  senna 2 Tablet(s) Oral at bedtime    MEDICATIONS  (PRN):  ondansetron Injectable 4 milliGRAM(s) IV Push every 6 hours PRN Nausea and/or Vomiting    Pertinent Labs: 12-26 Na148 mmol/L<H> Glu 262 mg/dL<H> K+ 4.7 mmol/L Cr  2.30 mg/dL<H>  mg/dL<H> 12-26 Phos 3.6 mg/dL 12-26 Alb 1.5 g/dL<L>, Hgb 12.7, Hct 37.9, Chloride 111, Calcium 7.6     CAPILLARY BLOOD GLUCOSE    POCT Blood Glucose.: 364 mg/dL (26 Dec 2020 12:33)  POCT Blood Glucose.: 314 mg/dL (26 Dec 2020 05:18)  POCT Blood Glucose.: 289 mg/dL (25 Dec 2020 23:10)  POCT Blood Glucose.: 170 mg/dL (25 Dec 2020 18:12)    Skin: Sacrum DTI  +1 dependent, b/l arm edema     Estimated Needs:   [ x] no change since previous assessment  [ ] recalculated:     Previous Nutrition Diagnosis:    [x ] Increased Nutrient Needs    Nutrition Diagnosis is [ x] ongoing-being addressed with TF via PEG     New Nutrition Diagnosis: [x ] not applicable       Interventions:   Recommend  [ ] Change Diet To:  [ ] Nutrition Supplement  [x ] Nutrition Support: Continue with current TF of Pivot 1.5 starting at 20ml and increase by 10ml every 6 hours until goal rate of 65ml x 20hrs for a total volume of 1,300ml/day, provided a total of 1,950 kcal/day, 121 gram protein/day.   [ x] Other:   1) Monitor pt's tolerance to TF  2) Monitor pt's weight, skin, edema, GI distress     Monitoring and Evaluation:   [ ] PO intake [ x ] Tolerance to diet prescription [ x ] weights [ x ] labs[ x ] follow up per protocol  [ x] other: RD to remain available

## 2020-12-26 NOTE — PROGRESS NOTE ADULT - ASSESSMENT
Patient is a 89 yo M with HTN DM HLD dementia (walks with cane) who p/w gen weakness x past several days. fever, recently diagnosed with COVID sent to hospital as family can no longer care for this patient at home - 12/14 first COVID+ PCR    RECOMMENDATIONS  12/14 NLR 3.47/0.46=7.5 97% on RA, would NOT start steroid as pt sats fine, rec LMWH prophylactic dose, with no hypoxemia and unclear duration rec NO remdesivir  blood with what appears to be a contaminant, urine with <100k enterococcus  12/15 agree with stopping steroids, continue LMWH   12/16 NLR 4/0.5=8 blood cultures with what appear to be contaminant no further Rx, on RA, continued fevers  12/17 now on NC-3.5L some concerns for secondary process will send off further testing, STEROIDs started  12/18 NC-3L, meeting criteria so REMDESIVIR started  12/19 NLR 8.92/0.65=13.7, NC 4L, continue remdesivir, steroids, lovenox, monitor CBC with diff, inflammatory markers  12/20 noted to be hypothermic today with rapid afiba and worsening CXR, transferred to ICU, placed on BIPAP. Lactic acid ~10, LFTs now elevated with AST >5x ULN and Cr 3.2 with GFR <30 - stopped remdesivir. Send for inflammatory markers and CBC with diff - will follow to evaluate for possible Tocilizumab. Send blood cultures. Start empirically on MEROPENEM . FPR 74605/[id number], bandemia 19%, worsening CXR  12/21 NLR 8.09/0.38=21.3, prior enterococcus in urine on 12/14, rec sending sputum, urine, follow blood cultures, suspect secondary bacterial infection, send fungitell, aspergillus galactamanan  blood cultures NGTD  12/22 NLR 10.79/0.5=21 concern for secondary bacterial infection, send fungitell, aspergillus galactamanan  12/23 NLR 13.22/0.60=22, down to high flow NC, rising lactate, would add CASPOFUNGIN     FUNGITELL 187  12/24 on BIPAP, cont current Rx, Fungitell, Asp Ag pending  12/25 dropping wbc, nut NLR 11.3/0.3=38, with high fungitell continue current Rx  12/26 concerns with thrombocytopenia and COVID, caution with nay anticoagulation, meropenem day 7 so will look at 12/29 as possible stop date

## 2020-12-26 NOTE — PROGRESS NOTE ADULT - ASSESSMENT
88M with PMHx HTN, DM, HLD, dementia who presented to the hospital with weakness and fever. Recently dx'd with COVID.  Admitted to medical service with COVID PNA. Receiving steroids and Remdesivir.   Now with AF RVR, transferred to ICU for hypotension and tachycardiac    AF:  - has gotten pushes of amio, and has been on dilt, now off  - Would consider resuming amiodarone infusion to help maintain NSR.  Can also do PO load to 5g if patient able to tolerate PO  - off a/c in setting of thrombocytopenia    HF:  - unknown LV function  - defer diuresis at this point given intermittent hypotension and pressor requirements   - monitor renal function and electrolytes  - check TTE    - stricts I&O's, daily weights    COVID  - BiPAP and oxygen supplementation as needed  - pulm/ICU input appreciated    AZALEA  - Cr remains in 2.3-2.4 range  - strict I/Os, goal even to negative fluid balance,   - trend Cr      - Monitor and replete lytes, keep K>4 and Mg >2  - Further cardiac workup will depend on clinical course.   - All other workup per primary team

## 2020-12-26 NOTE — PROGRESS NOTE ADULT - SUBJECTIVE AND OBJECTIVE BOX
[facility] DIVISION of INFECTIOUS DISEASE  Denver Lew MD PhD, Maxine Rodriguez MD, Anahy Syed MD, Jelena Regalado MD  and providing coverage with Katherine Martinez MD and Mala Bray MD  Providing Infectious Disease Consultations at [facility], [facility], [facility]      Office# [phone] to schedule follow up appointments  Answering Service for urgent calls or New Consults [phone]  Cell# to text for urgent issues Denver Lew [phone]     Infectious diseases progress note:    SPENCER LEVY is a 88y y. o. Male patient    COVID Patient    Allergies    penicillins (Unknown)  sulfa drugs (Unknown)    Intolerances        ANTIBIOTICS/RELEVANT:  antimicrobials  caspofungin IVPB      caspofungin IVPB 50 milliGRAM(s) IV Intermittent every 24 hours  meropenem  IVPB 500 milliGRAM(s) IV Intermittent every 12 hours    immunologic:    OTHER:  aMIOdarone    Tablet 200 milliGRAM(s) Oral daily  dexMEDEtomidine Infusion 0.5 MICROgram(s)/kG/Hr IV Continuous <Continuous>  dextrose 40% Gel 15 Gram(s) Oral once  dextrose 5%. 1000 milliLiter(s) IV Continuous <Continuous>  dextrose 5%. 1000 milliLiter(s) IV Continuous <Continuous>  dextrose 50% Injectable 25 Gram(s) IV Push once  dextrose 50% Injectable 12.5 Gram(s) IV Push once  dextrose 50% Injectable 25 Gram(s) IV Push once  glucagon  Injectable 1 milliGRAM(s) IntraMuscular once  insulin glargine Injectable (LANTUS) 10 Unit(s) SubCutaneous at bedtime  insulin lispro (ADMELOG) corrective regimen sliding scale   SubCutaneous every 6 hours  midodrine 10 milliGRAM(s) Oral every 8 hours  ondansetron Injectable 4 milliGRAM(s) IV Push every 6 hours PRN  phenylephrine    Infusion 0.5 MICROgram(s)/kG/Min IV Continuous <Continuous>  polyethylene glycol 3350 17 Gram(s) Oral two times a day  senna 2 Tablet(s) Oral at bedtime      Objective:  Vital Signs Last 24 Hrs  T(C): 37.2 (26 Dec 2020 08:27), Max: 37.2 (26 Dec 2020 08:27)  T(F): 99 (26 Dec 2020 08:27), Max: 99 (26 Dec 2020 08:27)  HR: 85 (26 Dec 2020 10:21) (51 - 144)  BP: 121/84 (26 Dec 2020 10:21) (94/55 - 136/70)  BP(mean): 98 (26 Dec 2020 10:21) (70 - 100)  RR: 37 (26 Dec 2020 10:21) (20 - 38)  SpO2: 86% (26 Dec 2020 10:21) (84% - 100%)    T(C): 37.2 (12-26-20 @ 08:27), Max: 37.2 (12-26-20 @ 08:27)  T(C): 37.2 (12-26-20 @ 08:27), Max: 37.2 (12-23-20 @ 22:12)  T(C): 37.2 (12-26-20 @ 08:27), Max: 37.2 (12-23-20 @ 22:12)    PHYSICAL EXAM: on high flow NC  HEENT: NC atraumatic  Neck: supple  Respiratory: no accessory muscle use, breathing comfortably  Cardiovascular: distant  Gastrointestinal: normal appearing, nondistended  Extremities: no clubbing, no cyanosis,      LABS:                          12.7   13.54 )-----------( 29       ( 26 Dec 2020 06:49 )             37.9       13.54 12-26 @ 06:49  12.81 12-25 @ 14:16  12.18 12-25 @ 05:47  17.65 12-24 @ 06:08  14.36 12-23 @ 05:16  11.83 12-22 @ 05:51  11.93 12-22 @ 00:39  9.44 12-21 @ 05:41  9.32 12-20 @ 13:38      12-26    148<H>  |  111<H>  |  100<H>  ----------------------------<  262<H>  4.7   |  31  |  2.30<H>    Ca    7.6<L>      26 Dec 2020 06:49  Phos  3.6     12-26  Mg     2.5     12-26    TPro  5.5<L>  /  Alb  1.5<L>  /  TBili  1.5<H>  /  DBili  .80<H>  /  AST  26  /  ALT  30  /  AlkPhos  174<H>  12-26      Creatinine, Serum: 2.30 mg/dL (12-26-20 @ 06:49)  Creatinine, Serum: 2.40 mg/dL (12-25-20 @ 05:47)  Creatinine, Serum: 2.30 mg/dL (12-24-20 @ 06:08)  Creatinine, Serum: 3.40 mg/dL (12-23-20 @ 05:16)  Creatinine, Serum: 3.40 mg/dL (12-22-20 @ 05:51)  Creatinine, Serum: 3.70 mg/dL (12-21-20 @ 10:23)  Creatinine, Serum: 3.20 mg/dL (12-21-20 @ 05:42)  Creatinine, Serum: 3.20 mg/dL (12-20-20 @ 09:06)      PTT - ( 25 Dec 2020 05:47 )  PTT:47.4 sec          COVID RISK SCORE  Auto Neutrophil #: 11.32 K/uL (12-25-20 @ 05:47)  Auto Lymphocyte #: 0.31 K/uL (12-25-20 @ 05:47)  Auto Neutrophil #: 16.15 K/uL (12-24-20 @ 06:08)  Auto Lymphocyte #: 0.69 K/uL (12-24-20 @ 06:08)  Auto Neutrophil #: 13.22 K/uL (12-23-20 @ 05:16)  Auto Lymphocyte #: 0.60 K/uL (12-23-20 @ 05:16)  Auto Neutrophil #: 10.79 K/uL (12-22-20 @ 05:51)  Auto Lymphocyte #: 0.51 K/uL (12-22-20 @ 05:51)  Auto Neutrophil #: 8.09 K/uL (12-21-20 @ 05:41)  Auto Lymphocyte #: 0.38 K/uL (12-21-20 @ 05:41)  Auto Neutrophil #: 8.02 K/uL (12-20-20 @ 13:38)  Auto Lymphocyte #: 0.84 K/uL (12-20-20 @ 13:38)  Auto Neutrophil #: 8.92 K/uL (12-19-20 @ 07:29)  Auto Lymphocyte #: 0.65 K/uL (12-19-20 @ 07:29)  Auto Neutrophil #: 7.76 K/uL (12-18-20 @ 07:31)  Auto Lymphocyte #: 0.29 K/uL (12-18-20 @ 07:31)  Auto Neutrophil #: 4.13 K/uL (12-16-20 @ 10:13)  Auto Lymphocyte #: 0.50 K/uL (12-16-20 @ 10:13)  Auto Neutrophil #: 2.56 K/uL (12-15-20 @ 06:00)  Auto Lymphocyte #: 0.57 K/uL (12-15-20 @ 06:00)  Auto Neutrophil #: 3.47 K/uL (12-14-20 @ 12:44)  Auto Lymphocyte #: 0.46 K/uL (12-14-20 @ 12:44)    Lactate, Blood: 4.8 mmol/L (12-23-20 @ 05:16)  Lactate, Blood: 4.0 mmol/L (12-22-20 @ 19:50)  Lactate, Blood: 7.2 mmol/L (12-21-20 @ 10:31)  Lactate, Blood: 10.0 mmol/L (12-20-20 @ 16:43)  Lactate, Blood: 10.7 mmol/L (12-20-20 @ 11:02)  Lactate, Blood: 1.1 mmol/L (12-15-20 @ 06:00)  Lactate, Blood: 2.2 mmol/L (12-14-20 @ 12:44)    Auto Eosinophil #: 0.00 K/uL (12-25-20 @ 05:47)  Auto Eosinophil #: 0.00 K/uL (12-24-20 @ 06:08)  Auto Eosinophil #: 0.00 K/uL (12-23-20 @ 05:16)  Auto Eosinophil #: 0.00 K/uL (12-22-20 @ 05:51)  Auto Eosinophil #: 0.00 K/uL (12-21-20 @ 05:41)  Auto Eosinophil #: 0.00 K/uL (12-20-20 @ 13:38)  Auto Eosinophil #: 0.00 K/uL (12-19-20 @ 07:29)  Auto Eosinophil #: 0.00 K/uL (12-18-20 @ 07:31)  Auto Eosinophil #: 0.00 K/uL (12-16-20 @ 10:13)  Auto Eosinophil #: 0.00 K/uL (12-15-20 @ 06:00)  Auto Eosinophil #: 0.00 K/uL (12-14-20 @ 12:44)    Lactate Dehydrogenase, Serum: 839 U/L (12-25-20 @ 19:52)  Lactate Dehydrogenase, Serum: 882 U/L (12-20-20 @ 22:45)  Lactate Dehydrogenase, Serum: 426 U/L (12-16-20 @ 15:40)  Lactate Dehydrogenase, Serum: 265 U/L (12-15-20 @ 09:19)  Lactate Dehydrogenase, Serum: 285 U/L (12-14-20 @ 17:41)    Sedimentation Rate, Erythrocyte: 22 mm/hr (12-20-20 @ 01:24)  Sedimentation Rate, Erythrocyte: 25 mm/hr (12-15-20 @ 06:00)    Procalcitonin, Serum: 14.77 ng/mL (12-20-20 @ 16:43)  Procalcitonin, Serum: 1.54 ng/mL (12-20-20 @ 01:24)  Procalcitonin, Serum: 0.49 ng/mL (12-18-20 @ 07:31)  Procalcitonin, Serum: 0.13 ng/mL (12-15-20 @ 06:00)  Procalcitonin, Serum: 0.13 ng/mL (12-14-20 @ 12:44)    Troponin I, Serum: .015 ng/mL (12-14-20 @ 12:44)    Creatine Kinase, Serum: 298 U/L (12-20-20 @ 09:06)  Creatine Kinase, Serum: 253 U/L (12-15-20 @ 06:00)  Creatine Kinase, Serum: 273 U/L (12-14-20 @ 12:44)        Ferritin, Serum: 38549 ng/mL (12-20-20 @ 22:45)  Ferritin, Serum: 4391 ng/mL (12-20-20 @ 04:56)  Ferritin, Serum: 1748 ng/mL (12-18-20 @ 11:03)  Ferritin, Serum: 904 ng/mL (12-16-20 @ 15:28)  Ferritin, Serum: 664 ng/mL (12-15-20 @ 09:17)  Ferritin, Serum: 640 ng/mL (12-14-20 @ 17:41)        Activated Partial Thromboplastin Time: 47.4 sec (12-25-20 @ 05:47)  Activated Partial Thromboplastin Time: 60.5 sec (12-24-20 @ 06:08)  Activated Partial Thromboplastin Time: 66.1 sec (12-23-20 @ 13:32)  Activated Partial Thromboplastin Time: 85.7 sec (12-23-20 @ 05:16)  Activated Partial Thromboplastin Time: 199.9 sec (12-22-20 @ 19:50)  Activated Partial Thromboplastin Time: >200.0 sec (12-22-20 @ 09:26)  Activated Partial Thromboplastin Time: > 200 sec (12-22-20 @ 00:39)  INR: 1.45 ratio (12-21-20 @ 10:23)  Activated Partial Thromboplastin Time: 40.7 sec (12-21-20 @ 10:23)  INR: 1.08 ratio (12-20-20 @ 01:24)  Activated Partial Thromboplastin Time: 34.2 sec (12-20-20 @ 01:24)  Activated Partial Thromboplastin Time: 30.0 sec (12-14-20 @ 12:44)  INR: 1.06 ratio (12-14-20 @ 12:44)    D-Dimer Assay, Quantitative: 4881 ng/mL DDU (12-25-20 @ 14:16)  D-Dimer Assay, Quantitative: 2980 ng/mL DDU (12-20-20 @ 16:43)  D-Dimer Assay, Quantitative: 1198 ng/mL DDU (12-18-20 @ 07:31)  D-Dimer Assay, Quantitative: 1774 ng/mL DDU (12-16-20 @ 10:13)  D-Dimer Assay, Quantitative: 931 ng/mL DDU (12-15-20 @ 06:00)  D-Dimer Assay, Quantitative: 3136 ng/mL DDU (12-14-20 @ 12:44)        MICROBIOLOGY:              RADIOLOGY & ADDITIONAL STUDIES:

## 2020-12-26 NOTE — PROGRESS NOTE ADULT - ASSESSMENT
AZALEA on CKD stage 3  COVID 19+  Metabolic Acidosis  Hypernatremia     -BLCR 1.4  -AZALEA 2/2 hypoxemic injury, AZALEA now improving   -Urine lytes reviewed  -UA 30 protein mod blood  -Add free water   -Monitor urine output  -Strict &Os  -Pressors per ICU  -Patient DNR/DNI  -No acute indication for dialysis; poor candidate given age and comorbidities    d/w ICU

## 2020-12-26 NOTE — CHART NOTE - NSCHARTNOTESELECT_GEN_ALL_CORE
Event Note
Nutrition Services
acp/Event Note
Critical Care PA/Event Note
Nutrition Services
PGY-3/Event Note

## 2020-12-26 NOTE — PROGRESS NOTE ADULT - SUBJECTIVE AND OBJECTIVE BOX
Patient is a 88y old  Male who presents with a chief complaint of weakness, covid (26 Dec 2020 12:44)      INTERVAL /OVERNIGHT EVENTS: still on bipap    MEDICATIONS  (STANDING):  aMIOdarone    Tablet 200 milliGRAM(s) Oral daily  aMIOdarone Infusion 0.5 mG/Min (16.7 mL/Hr) IV Continuous <Continuous>  caspofungin IVPB      caspofungin IVPB 50 milliGRAM(s) IV Intermittent every 24 hours  dexMEDEtomidine Infusion 0.5 MICROgram(s)/kG/Hr (9.64 mL/Hr) IV Continuous <Continuous>  dextrose 40% Gel 15 Gram(s) Oral once  dextrose 5%. 1000 milliLiter(s) (50 mL/Hr) IV Continuous <Continuous>  dextrose 5%. 1000 milliLiter(s) (100 mL/Hr) IV Continuous <Continuous>  dextrose 50% Injectable 25 Gram(s) IV Push once  dextrose 50% Injectable 12.5 Gram(s) IV Push once  dextrose 50% Injectable 25 Gram(s) IV Push once  glucagon  Injectable 1 milliGRAM(s) IntraMuscular once  heparin   Injectable 5000 Unit(s) SubCutaneous every 8 hours  insulin glargine Injectable (LANTUS) 20 Unit(s) SubCutaneous at bedtime  insulin lispro (ADMELOG) corrective regimen sliding scale   SubCutaneous every 6 hours  meropenem  IVPB 500 milliGRAM(s) IV Intermittent every 12 hours  midodrine 20 milliGRAM(s) Oral every 8 hours  phenylephrine    Infusion 0.5 MICROgram(s)/kG/Min (14.5 mL/Hr) IV Continuous <Continuous>  polyethylene glycol 3350 17 Gram(s) Oral two times a day  senna 2 Tablet(s) Oral at bedtime    MEDICATIONS  (PRN):  ondansetron Injectable 4 milliGRAM(s) IV Push every 6 hours PRN Nausea and/or Vomiting      Allergies    penicillins (Unknown)  sulfa drugs (Unknown)    Intolerances        REVIEW OF SYSTEMS:  unable to obtain    Vital Signs Last 24 Hrs  T(C): 36.6 (26 Dec 2020 16:00), Max: 37.5 (26 Dec 2020 12:00)  T(F): 97.8 (26 Dec 2020 16:00), Max: 99.5 (26 Dec 2020 12:00)  HR: 80 (26 Dec 2020 20:14) (63 - 137)  BP: 106/62 (26 Dec 2020 19:00) (92/54 - 154/64)  BP(mean): 78 (26 Dec 2020 19:00) (67 - 100)  RR: 33 (26 Dec 2020 19:00) (21 - 39)  SpO2: 98% (26 Dec 2020 20:14) (84% - 100%)    PHYSICAL EXAM:  GENERAL: NAD, well-groomed, well-developed  HEAD:  Atraumatic, Normocephalic  EYES: EOMI, PERRLA, conjunctiva and sclera clear  ENMT: No tonsillar erythema, exudates, or enlargement; Moist mucous membranes, Good dentition, No lesions  NECK: Supple, No JVD, Normal thyroid  NERVOUS SYSTEM:  Alert & awake; Motor Strength 5/5 B/L upper and lower extremities; DTRs 2+ intact and symmetric  CHEST/LUNG: Clear to auscultation bilaterally; No rales, rhonchi, wheezing, or rubs  HEART: Regular rate and rhythm; No murmurs, rubs, or gallops  ABDOMEN: Soft, Nontender, Nondistended; Bowel sounds present  EXTREMITIES:  2+ Peripheral Pulses, No clubbing, cyanosis, or edema  LYMPH: No lymphadenopathy noted  SKIN: No rashes or lesions    LABS:                        12.7   13.54 )-----------( 29       ( 26 Dec 2020 06:49 )             37.9     26 Dec 2020 06:49    148    |  111    |  100    ----------------------------<  262    4.7     |  31     |  2.30     Ca    7.6        26 Dec 2020 06:49  Phos  3.6       26 Dec 2020 06:49  Mg     2.5       26 Dec 2020 06:49    TPro  5.5    /  Alb  1.5    /  TBili  1.5    /  DBili  .80    /  AST  26     /  ALT  30     /  AlkPhos  174    26 Dec 2020 06:49    PTT - ( 25 Dec 2020 05:47 )  PTT:47.4 sec    CAPILLARY BLOOD GLUCOSE      POCT Blood Glucose.: 436 mg/dL (26 Dec 2020 18:59)  POCT Blood Glucose.: 440 mg/dL (26 Dec 2020 18:27)  POCT Blood Glucose.: 444 mg/dL (26 Dec 2020 18:25)  POCT Blood Glucose.: 364 mg/dL (26 Dec 2020 12:33)  POCT Blood Glucose.: 314 mg/dL (26 Dec 2020 05:18)  POCT Blood Glucose.: 289 mg/dL (25 Dec 2020 23:10)      RADIOLOGY & ADDITIONAL TESTS:    Notes Reviewed:  [x ] YES  [ ] NO    Care Discussed with Consultants/Other Providers [x ] YES  [ ] NO

## 2020-12-26 NOTE — PROGRESS NOTE ADULT - ASSESSMENT
2.17.2020 This is an 88 M with dementia comes with COVID PNA. Patient requires help with ADLs. He lives with daughter and wife. His daughter is his HCP. Daughter reports that both her parents have advanced directives that state that they are a DNR/DNI. She would like to complete MOLST. MOLST was reviewed, witnessed and signed. DNR/DNI order entered into EMR.    12.18.2020 Patient still febrile and requiring oxygen. Poor po intake. On remdisivir and decadron.    12.19.2020 Afebrile x 24 hours. Continues to require oxygen.    12.20.2020 Transferred to ICU with acute on chronic hypoxic respiratory failure secondary to COVID PNA. Patient currently on BIPAP. Overall prognosis poor. Daughter aware. Patient remains a DNR/DNI.    12.21.2020 Cr worsening. Not candidate for HD. Off BIPAP. Tolerating high-flow. Overall prognosis poor.    12.22.2020 Tolerating high flow. On iv abx for superimposed infection. Prognosis poor.    12.23.2020 On high flow. For CT C/A/P. On iv abx for bacteremia, superimposed infection. Prognosis poor. Patient DNR/DNI.     12.24.2020 CT noted. Spoke to daughter. No improvement in patient's overall condition and overall prognosis remains poor. She will consider comfort measures only. She would like to discuss with her family.    12.25.2020 No improvement. Family considering comfort care.    12.26.2020 Patient continues to deteriorate. Overall prognosis poor. Spoke to family -- they are likely going to opt for comfort care, however, they would like to discuss patient's clinical condition with ICU team as well. ICU team aware.

## 2020-12-26 NOTE — CHART NOTE - NSCHARTNOTEFT_GEN_A_CORE
Do you have Advance Directives (HCP / LV / Organ donation / Documentation of oral advance Directive):   (  x  )  yes    (      )    NO                                                                            Do you have LV - Living will :                                                                                                                                             (  x  )  yes    (      )   No    Do you have HCP - Health Care Proxy:                                                                                                                            (  x   )  yes   (       ) N0    Do you have DNR- Do Not Resuscitate :                                                                                                                           (   x   )  yes  (        )  No    Do you have DNI- Do Not intubate  :                                                                                                                               (    x  )  yes   (       ) No    Do you have MOLST - Medical orders for Life sustaining treatment  :                                                                    (      ) yes    (   x    ) No    Decision Maker :  (     ) Patient     (   x   )  HCA   (     ) Public Health Law Surrogate     (      ) Surrogate  (       ) Guardian    Goals of Care :  (      )   Complete Care     (       ) No Limitations                              (       )   Comfort Care       (       )  Hospice                               (    x  )   Limited medical Intervention / s    Medical Interventions :   (        )   CPR       (     x   )  DNR                                               (        )  Intubation with MV - Mechanical Ventilation  (  x    ) BIPAP/CPAP    (      x   )   DNI                                               (         )  Artificial Nutrition -  IVF, TPN / PPN, Tube Feeds             (   x      )   No Feeding Tube                                                (    x    ) Use Antibiotics                         (          ) No Antibiotics                                                (     x    ) Blood and Blood Products     (         )   No Blood or Blood products                                                (          )  Dialysis                                    (   x      )  No Dialysis                                                (       x   )  Medical Management only  (     x    )  No Invasive Interventions or Surgery  Time spent :                        (    x   ) upto 30 minutes                       (        x   )   more than 30 minutes  ACP and GOC reviewed and discussed

## 2020-12-26 NOTE — PROGRESS NOTE ADULT - SUBJECTIVE AND OBJECTIVE BOX
Date/Time Patient Seen:  		  Referring MD:   Data Reviewed	       Patient is a 88y old  Male who presents with a chief complaint of weakness, covid (26 Dec 2020 08:00)      Subjective/HPI     PAST MEDICAL & SURGICAL HISTORY:  Gout    Hyperlipidemia    Hypertension          Medication list         MEDICATIONS  (STANDING):  aMIOdarone    Tablet 200 milliGRAM(s) Oral daily  caspofungin IVPB      caspofungin IVPB 50 milliGRAM(s) IV Intermittent every 24 hours  dexMEDEtomidine Infusion 0.5 MICROgram(s)/kG/Hr (9.64 mL/Hr) IV Continuous <Continuous>  dextrose 40% Gel 15 Gram(s) Oral once  dextrose 5%. 1000 milliLiter(s) (50 mL/Hr) IV Continuous <Continuous>  dextrose 5%. 1000 milliLiter(s) (100 mL/Hr) IV Continuous <Continuous>  dextrose 50% Injectable 25 Gram(s) IV Push once  dextrose 50% Injectable 12.5 Gram(s) IV Push once  dextrose 50% Injectable 25 Gram(s) IV Push once  glucagon  Injectable 1 milliGRAM(s) IntraMuscular once  insulin glargine Injectable (LANTUS) 10 Unit(s) SubCutaneous at bedtime  insulin lispro (ADMELOG) corrective regimen sliding scale   SubCutaneous every 6 hours  meropenem  IVPB 500 milliGRAM(s) IV Intermittent every 12 hours  midodrine 10 milliGRAM(s) Oral every 8 hours  phenylephrine    Infusion 0.5 MICROgram(s)/kG/Min (14.5 mL/Hr) IV Continuous <Continuous>  polyethylene glycol 3350 17 Gram(s) Oral two times a day  senna 2 Tablet(s) Oral at bedtime    MEDICATIONS  (PRN):  ondansetron Injectable 4 milliGRAM(s) IV Push every 6 hours PRN Nausea and/or Vomiting         Vitals log        ICU Vital Signs Last 24 Hrs  T(C): 37.2 (26 Dec 2020 08:27), Max: 37.2 (26 Dec 2020 08:27)  T(F): 99 (26 Dec 2020 08:27), Max: 99 (26 Dec 2020 08:27)  HR: 81 (26 Dec 2020 08:27) (51 - 144)  BP: 104/73 (26 Dec 2020 08:27) (94/55 - 136/70)  BP(mean): 81 (26 Dec 2020 08:27) (70 - 100)  ABP: --  ABP(mean): --  RR: 33 (26 Dec 2020 08:27) (20 - 38)  SpO2: 97% (26 Dec 2020 08:27) (74% - 100%)           Input and Output:  I&O's Detail    25 Dec 2020 07:01  -  26 Dec 2020 07:00  --------------------------------------------------------  IN:    Amiodarone: 66.8 mL    Dexmedetomidine: 148.4 mL    Phenylephrine: 684.9 mL    Pivot 1.5: 550 mL  Total IN: 1450.1 mL    OUT:    Indwelling Catheter - Urethral (mL): 1280 mL  Total OUT: 1280 mL    Total NET: 170.1 mL      26 Dec 2020 07:01  -  26 Dec 2020 09:26  --------------------------------------------------------  IN:    Dexmedetomidine: 5.8 mL    Phenylephrine: 23.1 mL    Pivot 1.5: 65 mL  Total IN: 93.9 mL    OUT:    Indwelling Catheter - Urethral (mL): 40 mL  Total OUT: 40 mL    Total NET: 53.9 mL          Lab Data                        12.7   13.54 )-----------( 29       ( 26 Dec 2020 06:49 )             37.9     12-26    148<H>  |  111<H>  |  100<H>  ----------------------------<  262<H>  4.7   |  31  |  2.30<H>    Ca    7.6<L>      26 Dec 2020 06:49  Phos  3.6     12-26  Mg     2.5     12-26    TPro  5.5<L>  /  Alb  1.5<L>  /  TBili  1.5<H>  /  DBili  .80<H>  /  AST  26  /  ALT  30  /  AlkPhos  174<H>  12-26            Review of Systems	      Objective     Physical Examination    heart s1s2  lung dec BS  abd soft      Pertinent Lab findings & Imaging      Potter:  NO   Adequate UO     I&O's Detail    25 Dec 2020 07:01  -  26 Dec 2020 07:00  --------------------------------------------------------  IN:    Amiodarone: 66.8 mL    Dexmedetomidine: 148.4 mL    Phenylephrine: 684.9 mL    Pivot 1.5: 550 mL  Total IN: 1450.1 mL    OUT:    Indwelling Catheter - Urethral (mL): 1280 mL  Total OUT: 1280 mL    Total NET: 170.1 mL      26 Dec 2020 07:01  -  26 Dec 2020 09:26  --------------------------------------------------------  IN:    Dexmedetomidine: 5.8 mL    Phenylephrine: 23.1 mL    Pivot 1.5: 65 mL  Total IN: 93.9 mL    OUT:    Indwelling Catheter - Urethral (mL): 40 mL  Total OUT: 40 mL    Total NET: 53.9 mL               Discussed with:     Cultures:	        Radiology

## 2020-12-26 NOTE — PROGRESS NOTE ADULT - SUBJECTIVE AND OBJECTIVE BOX
Patient is a 88y old  Male who presents with a chief complaint of weakness, covid (21 Dec 2020 09:18)       HPI:  89 yo M with HTN DM HLD dementia (walks with cane) who p/w gen weakness x past several days. Patient has not been eating or drinking for the past couple of days. He started having fevers and chills today 101.4. Patient's wife returned home from [facility] on 11/30. The daughter, who lives in the same house, works in a school. They all started to get sick after after wife got home on 11/30.   Pt was recently diagnosed with COVID as mult members in family have the same. Pt sent to hospital as famly can no longer care for this patient at home - pt was seen in ed yesterday for fall -- no acute findings -- and was sent home. Pt may have slid off chair today - no inj. Pt denies complaints. No abd pain. No acute dyspnea. No other acute co.  (14 Dec 2020 16:50)     No acute overnight events.     Seen in ICU    PAST MEDICAL & SURGICAL HISTORY:  Gout    Hyperlipidemia    Hypertension         FAMILY HISTORY:  NC    Social History:Non smoker    MEDICATIONS  (STANDING):  dexAMETHasone  Injectable 6 milliGRAM(s) IV Push daily  dextrose 40% Gel 15 Gram(s) Oral once  dextrose 5%. 1000 milliLiter(s) (50 mL/Hr) IV Continuous <Continuous>  dextrose 5%. 1000 milliLiter(s) (100 mL/Hr) IV Continuous <Continuous>  dextrose 50% Injectable 25 Gram(s) IV Push once  dextrose 50% Injectable 12.5 Gram(s) IV Push once  dextrose 50% Injectable 25 Gram(s) IV Push once  glucagon  Injectable 1 milliGRAM(s) IntraMuscular once  insulin lispro (ADMELOG) corrective regimen sliding scale   SubCutaneous every 6 hours  meropenem  IVPB 500 milliGRAM(s) IV Intermittent every 12 hours  phenylephrine    Infusion 0.5 MICROgram(s)/kG/Min (14.5 mL/Hr) IV Continuous <Continuous>  sodium bicarbonate  Infusion 0.195 mEq/kG/Hr (100 mL/Hr) IV Continuous <Continuous>    MEDICATIONS  (PRN):  ondansetron Injectable 4 milliGRAM(s) IV Push every 6 hours PRN Nausea and/or Vomiting   Meds reviewed    Allergies    penicillins (Unknown)  sulfa drugs (Unknown)    Intolerances         REVIEW OF SYSTEMS:    Limited due to respiratory status      ICU Vital Signs Last 24 Hrs  T(C): 36.5 (25 Dec 2020 12:00), Max: 36.8 (24 Dec 2020 23:33)  T(F): 97.7 (25 Dec 2020 12:00), Max: 98.2 (24 Dec 2020 23:33)  HR: 53 (25 Dec 2020 12:40) (51 - 114)  BP: 110/71 (25 Dec 2020 12:00) (87/61 - 152/89)  BP(mean): 86 (25 Dec 2020 12:00) (64 - 112)  ABP: --  ABP(mean): --  RR: 31 (25 Dec 2020 12:00) (26 - 42)  SpO2: 100% (25 Dec 2020 12:40) (74% - 100%)        PHYSICAL EXAM:    General: NAD  Deferred due to COVID 19 isolation and reduce transmission    LABS:  Reviewed

## 2020-12-26 NOTE — PROGRESS NOTE ADULT - ASSESSMENT
88M with PMHx HTN, DM, HLD, dementia who presented with weakness and fever. Recently dx'd with COVID.  Admitted to medical service with COVID PNA. Progression of hypoxemia requiring more O2, got a-fib with rvr and given lopressor and amio, had hypotension and tachycardia and RRT was called, pt started on IV amio and HFNC. Now alternating between bipap and HFNC.    #Neuro   - avoiding all neurosuppressants     #CV   - Gave bolus of amio this AM for a-fib. Will start on amio drip 1 mg/min for 6 hours and then .5 mg/min for 18 hours.  - keep K~4 and Mg>2 for optimal arrhythmia suppression  - TSH wnl  - on luann for bp support  - c/w midodrine  - will hold on Echo given his overall prognosis is likely poor  - c/w heparin gtt    Pulm  - on HFNC with use of bipap at night  - c/w IV steroids  - off Remdesivir in setting of britt  - pt remains DNI             GI   - npo diet with tube feeds  - c/w PPI  - CT abdomen shows small amount of ascites but no acute abdominal pathology    Renal   -Cr baseline 1.2-1.4, renal indices elevated but downtrending  -avoid MAP<65, avoid nephrotoxins, strict I/Os, goal even to negative fluid balance, trend Cr    -mueller     Heme   -c/w heparin gtt    ID   - BCx NGTD  - Leukocytosis uptrending  - c/w meropenem and caspofungin  - CT chest and abdomen:  severe diffuse b/l airspace disease compatible w/ PNA. Small b/l pleural effusions. Small amount of ascites but no acute abdominal pathology.  - F/u aspergillus and fungitell  - off remdesivir due to britt  - c/w steroids    Endo   -ISS coverage [id number] with mod dosing  -TSH wnl      COVID 19 specific considerations and therapeutic options based on the available and rapidly changing literature    MOLST in place and remains DNR/DNI.   88M with PMHx HTN, DM, HLD, dementia who presented with weakness and fever. Recently dx'd with COVID.  Admitted to medical service with COVID PNA. Progression of hypoxemia requiring more O2, got a-fib with rvr and given lopressor and amio, had hypotension and tachycardia and RRT was called, pt started on IV amio and HFNC. Now alternating between bipap and HFNC.    #Neuro   - wean precedex as tolerated     #CV   - Pt given amio and Mg this AM for rapid a-fib and started on amio infusion. Returned to NSR.   - keep K~4 and Mg>2 for optimal arrhythmia suppression  - on luann for bp support, wean as tolerated. Will increase midodrine to 20 mg q8hr.   - dc'd heparin gtt due to worsening thrombocytopenia. Heparin induced platelet antibody negative.     Pulm  - on HFNC with use of bipap at night. Pt required being placed back on bipap this AM.   - s/p decadron  - off Remdesivir in setting of britt  - pt remains DNI             GI   - npo diet with tube feeds  - c/w PPI  - c/w senna and miralax for constipation  - CT abdomen shows small amount of ascites but no acute abdominal pathology    Renal   -Cr baseline 1.2-1.4, renal indices elevated but downtrending  - hypernatremia today, will start free water 250 cc q6hr  -avoid MAP<65, avoid nephrotoxins, strict I/Os, goal even to negative fluid balance, trend Cr    -mueller     Heme   -dc'd heparin gtt due to worsening thrombocytopenia  - heparin induced platelet antibody negative    ID   - Leukocytosis uptrending; Fungitell positive  - c/w meropenem and caspofungin  - CT chest and abdomen:  severe diffuse b/l airspace disease compatible w/ PNA. Small b/l pleural effusions. Small amount of ascites but no acute abdominal pathology.  - F/u aspergillus  - MRSA negative; BClx NGTD  - off remdesivir due to britt; s/p decadron     Endo   - C/w lantus 10U at bedtime and ISS coverage [id number] with mod dosing      COVID 19 specific considerations and therapeutic options based on the available and rapidly changing literature    MOLST in place and remains DNR/DNI.

## 2020-12-26 NOTE — PROGRESS NOTE ADULT - SUBJECTIVE AND OBJECTIVE BOX
Patient is a 88y old  Male who presents with a chief complaint of weakness, covid (18 Dec 2020 10:34)      Subjective: Patient seen    PAIN: N  DYSPNEA: N	  NAUS/VOM: 	N  SECRETIONS: 	N  AGITATION: N    OTHER REVIEW OF SYSTEMS: negative    Vital Signs Last 24 Hrs  T(C): 37.5 (26 Dec 2020 12:00), Max: 37.5 (26 Dec 2020 12:00)  T(F): 99.5 (26 Dec 2020 12:00), Max: 99.5 (26 Dec 2020 12:00)  HR: 137 (26 Dec 2020 12:00) (52 - 144)  BP: 110/65 (26 Dec 2020 12:00) (94/55 - 154/64)  BP(mean): 80 (26 Dec 2020 12:00) (70 - 100)  RR: 37 (26 Dec 2020 12:00) (20 - 39)  SpO2: 84% (26 Dec 2020 12:00) (84% - 100%)    12-26    148<H>  |  111<H>  |  100<H>  ----------------------------<  262<H>  4.7   |  31  |  2.30<H>    Ca    7.6<L>      26 Dec 2020 06:49  Phos  3.6     12-26  Mg     2.5     12-26    TPro  5.5<L>  /  Alb  1.5<L>  /  TBili  1.5<H>  /  DBili  .80<H>  /  AST  26  /  ALT  30  /  AlkPhos  174<H>  12-26                          12.7   13.54 )-----------( 29       ( 26 Dec 2020 06:49 )             37.9     PTT - ( 25 Dec 2020 05:47 )  PTT:47.4 sec  CAPILLARY BLOOD GLUCOSE      POCT Blood Glucose.: 364 mg/dL (26 Dec 2020 12:33)  POCT Blood Glucose.: 314 mg/dL (26 Dec 2020 05:18)  POCT Blood Glucose.: 289 mg/dL (25 Dec 2020 23:10)  POCT Blood Glucose.: 170 mg/dL (25 Dec 2020 18:12)  POCT Blood Glucose.: 200 mg/dL (25 Dec 2020 13:43)              aMIOdarone    Tablet 200 milliGRAM(s) Oral daily  aMIOdarone Infusion 0.5 mG/Min IV Continuous <Continuous>  caspofungin IVPB      caspofungin IVPB 50 milliGRAM(s) IV Intermittent every 24 hours  dexMEDEtomidine Infusion 0.5 MICROgram(s)/kG/Hr IV Continuous <Continuous>  dextrose 40% Gel 15 Gram(s) Oral once  dextrose 5%. 1000 milliLiter(s) IV Continuous <Continuous>  dextrose 5%. 1000 milliLiter(s) IV Continuous <Continuous>  dextrose 50% Injectable 25 Gram(s) IV Push once  dextrose 50% Injectable 12.5 Gram(s) IV Push once  dextrose 50% Injectable 25 Gram(s) IV Push once  glucagon  Injectable 1 milliGRAM(s) IntraMuscular once  insulin glargine Injectable (LANTUS) 10 Unit(s) SubCutaneous at bedtime  insulin lispro (ADMELOG) corrective regimen sliding scale   SubCutaneous every 6 hours  meropenem  IVPB 500 milliGRAM(s) IV Intermittent every 12 hours  midodrine 20 milliGRAM(s) Oral every 8 hours  ondansetron Injectable 4 milliGRAM(s) IV Push every 6 hours PRN  phenylephrine    Infusion 0.5 MICROgram(s)/kG/Min IV Continuous <Continuous>  polyethylene glycol 3350 17 Gram(s) Oral two times a day  senna 2 Tablet(s) Oral at bedtime      GENERAL:  on high flow  HEENT:  NC/AT   NECK: supple no JVD  CVS:  +S1 S2 RRR  RESP: decreased bs  GI:  soft NT/ND +BS  : no suprapubic tenderness  MUSC:  no lower extremity edema  SKIN:  Warm, moist, no rashes   LYMPH: normal     MEDS REVIEWED	            ADVANCED DIRECTIVES:         DNR     DNI    MOLST    PSYCHOSOCIAL-SPIRITUAL ASSESSMENT:    _x__Reviewed     x___Care  plan unchanged     ___Care plan adjusted as below    GOALS OF CARE DISCUSSION  	x___Palliative care info/counseling provided	    _x__Family meeting  	_x__Advanced Directives addressed	    _x__See previous Palliative Medicine Note    AGENCY CHOICE DISCUSSED:   ___HOSPICE   ___CALVARY  ___OTHER:              > 50% OF THE TIME SPENT IN COUNSELING AND COORDINATING CARE 	    Minutes:      PROLONGED SERVICE             FACE TO FACE:    PT            PT & FAMILY	       Minutes:      Advance Care Planning Time:

## 2020-12-27 DIAGNOSIS — N19 UNSPECIFIED KIDNEY FAILURE: ICD-10-CM

## 2020-12-27 DIAGNOSIS — Z29.9 ENCOUNTER FOR PROPHYLACTIC MEASURES, UNSPECIFIED: ICD-10-CM

## 2020-12-27 LAB
ALBUMIN SERPL ELPH-MCNC: 1.3 G/DL — LOW (ref 3.3–5)
ALBUMIN SERPL ELPH-MCNC: 1.3 G/DL — LOW (ref 3.3–5)
ALP SERPL-CCNC: 248 U/L — HIGH (ref 40–120)
ALP SERPL-CCNC: 250 U/L — HIGH (ref 40–120)
ALT FLD-CCNC: 45 U/L — SIGNIFICANT CHANGE UP (ref 12–78)
ALT FLD-CCNC: 45 U/L — SIGNIFICANT CHANGE UP (ref 12–78)
ANION GAP SERPL CALC-SCNC: 6 MMOL/L — SIGNIFICANT CHANGE UP (ref 5–17)
AST SERPL-CCNC: 46 U/L — HIGH (ref 15–37)
AST SERPL-CCNC: 47 U/L — HIGH (ref 15–37)
BASOPHILS # BLD AUTO: 0.02 K/UL — SIGNIFICANT CHANGE UP (ref 0–0.2)
BASOPHILS NFR BLD AUTO: 0.2 % — SIGNIFICANT CHANGE UP (ref 0–2)
BILIRUB DIRECT SERPL-MCNC: 0.9 MG/DL — HIGH (ref 0.05–0.2)
BILIRUB INDIRECT FLD-MCNC: 0.6 MG/DL — SIGNIFICANT CHANGE UP (ref 0.2–1)
BILIRUB SERPL-MCNC: 1.5 MG/DL — HIGH (ref 0.2–1.2)
BILIRUB SERPL-MCNC: 1.5 MG/DL — HIGH (ref 0.2–1.2)
BUN SERPL-MCNC: 115 MG/DL — HIGH (ref 7–23)
CALCIUM SERPL-MCNC: 7.4 MG/DL — LOW (ref 8.5–10.1)
CHLORIDE SERPL-SCNC: 114 MMOL/L — HIGH (ref 96–108)
CO2 SERPL-SCNC: 27 MMOL/L — SIGNIFICANT CHANGE UP (ref 22–31)
CREAT SERPL-MCNC: 1.9 MG/DL — HIGH (ref 0.5–1.3)
EOSINOPHIL # BLD AUTO: 0 K/UL — SIGNIFICANT CHANGE UP (ref 0–0.5)
EOSINOPHIL NFR BLD AUTO: 0 % — SIGNIFICANT CHANGE UP (ref 0–6)
GLUCOSE SERPL-MCNC: 351 MG/DL — HIGH (ref 70–99)
HCT VFR BLD CALC: 34.3 % — LOW (ref 39–50)
HGB BLD-MCNC: 11.5 G/DL — LOW (ref 13–17)
IMM GRANULOCYTES NFR BLD AUTO: 1 % — SIGNIFICANT CHANGE UP (ref 0–1.5)
LYMPHOCYTES # BLD AUTO: 0.36 K/UL — LOW (ref 1–3.3)
LYMPHOCYTES # BLD AUTO: 2.7 % — LOW (ref 13–44)
MAGNESIUM SERPL-MCNC: 2.8 MG/DL — HIGH (ref 1.6–2.6)
MCHC RBC-ENTMCNC: 29.6 PG — SIGNIFICANT CHANGE UP (ref 27–34)
MCHC RBC-ENTMCNC: 33.5 GM/DL — SIGNIFICANT CHANGE UP (ref 32–36)
MCV RBC AUTO: 88.2 FL — SIGNIFICANT CHANGE UP (ref 80–100)
MONOCYTES # BLD AUTO: 0.18 K/UL — SIGNIFICANT CHANGE UP (ref 0–0.9)
MONOCYTES NFR BLD AUTO: 1.4 % — LOW (ref 2–14)
NEUTROPHILS # BLD AUTO: 12.49 K/UL — HIGH (ref 1.8–7.4)
NEUTROPHILS NFR BLD AUTO: 94.7 % — HIGH (ref 43–77)
NRBC # BLD: 0 /100 WBCS — SIGNIFICANT CHANGE UP (ref 0–0)
PHOSPHATE SERPL-MCNC: 3.2 MG/DL — SIGNIFICANT CHANGE UP (ref 2.5–4.5)
PLATELET # BLD AUTO: 13 K/UL — CRITICAL LOW (ref 150–400)
POTASSIUM SERPL-MCNC: 4.4 MMOL/L — SIGNIFICANT CHANGE UP (ref 3.5–5.3)
POTASSIUM SERPL-SCNC: 4.4 MMOL/L — SIGNIFICANT CHANGE UP (ref 3.5–5.3)
PROT SERPL-MCNC: 4.8 G/DL — LOW (ref 6–8.3)
PROT SERPL-MCNC: 4.9 G/DL — LOW (ref 6–8.3)
RBC # BLD: 3.89 M/UL — LOW (ref 4.2–5.8)
RBC # FLD: 14 % — SIGNIFICANT CHANGE UP (ref 10.3–14.5)
SODIUM SERPL-SCNC: 147 MMOL/L — HIGH (ref 135–145)
WBC # BLD: 13.18 K/UL — HIGH (ref 3.8–10.5)
WBC # FLD AUTO: 13.18 K/UL — HIGH (ref 3.8–10.5)

## 2020-12-27 PROCEDURE — 99291 CRITICAL CARE FIRST HOUR: CPT

## 2020-12-27 PROCEDURE — 99291 CRITICAL CARE FIRST HOUR: CPT | Mod: CS

## 2020-12-27 RX ORDER — HYDROMORPHONE HYDROCHLORIDE 2 MG/ML
1 INJECTION INTRAMUSCULAR; INTRAVENOUS; SUBCUTANEOUS
Qty: 100 | Refills: 0 | Status: DISCONTINUED | OUTPATIENT
Start: 2020-12-27 | End: 2020-12-27

## 2020-12-27 RX ORDER — HYDROMORPHONE HYDROCHLORIDE 2 MG/ML
1 INJECTION INTRAMUSCULAR; INTRAVENOUS; SUBCUTANEOUS ONCE
Refills: 0 | Status: DISCONTINUED | OUTPATIENT
Start: 2020-12-27 | End: 2020-12-27

## 2020-12-27 RX ORDER — FUROSEMIDE 40 MG
40 TABLET ORAL ONCE
Refills: 0 | Status: COMPLETED | OUTPATIENT
Start: 2020-12-27 | End: 2020-12-27

## 2020-12-27 RX ORDER — HYDROMORPHONE HYDROCHLORIDE 2 MG/ML
1 INJECTION INTRAMUSCULAR; INTRAVENOUS; SUBCUTANEOUS
Qty: 10 | Refills: 0 | Status: DISCONTINUED | OUTPATIENT
Start: 2020-12-27 | End: 2020-12-27

## 2020-12-27 RX ORDER — HYDROMORPHONE HYDROCHLORIDE 2 MG/ML
0.5 INJECTION INTRAMUSCULAR; INTRAVENOUS; SUBCUTANEOUS
Refills: 0 | Status: DISCONTINUED | OUTPATIENT
Start: 2020-12-27 | End: 2020-12-27

## 2020-12-27 RX ADMIN — MIDODRINE HYDROCHLORIDE 20 MILLIGRAM(S): 2.5 TABLET ORAL at 05:57

## 2020-12-27 RX ADMIN — MIDODRINE HYDROCHLORIDE 20 MILLIGRAM(S): 2.5 TABLET ORAL at 13:56

## 2020-12-27 RX ADMIN — Medication 40 MILLIGRAM(S): at 12:00

## 2020-12-27 RX ADMIN — MEROPENEM 100 MILLIGRAM(S): 1 INJECTION INTRAVENOUS at 05:29

## 2020-12-27 RX ADMIN — DEXMEDETOMIDINE HYDROCHLORIDE IN 0.9% SODIUM CHLORIDE 9.64 MICROGRAM(S)/KG/HR: 4 INJECTION INTRAVENOUS at 19:20

## 2020-12-27 RX ADMIN — Medication 8: at 12:07

## 2020-12-27 RX ADMIN — Medication 2 MILLIGRAM(S): at 16:05

## 2020-12-27 RX ADMIN — PHENYLEPHRINE HYDROCHLORIDE 14.5 MICROGRAM(S)/KG/MIN: 10 INJECTION INTRAVENOUS at 12:00

## 2020-12-27 RX ADMIN — DEXMEDETOMIDINE HYDROCHLORIDE IN 0.9% SODIUM CHLORIDE 9.64 MICROGRAM(S)/KG/HR: 4 INJECTION INTRAVENOUS at 12:00

## 2020-12-27 RX ADMIN — Medication 8: at 05:33

## 2020-12-27 RX ADMIN — HYDROMORPHONE HYDROCHLORIDE 0.5 MILLIGRAM(S): 2 INJECTION INTRAMUSCULAR; INTRAVENOUS; SUBCUTANEOUS at 18:11

## 2020-12-27 RX ADMIN — HYDROMORPHONE HYDROCHLORIDE 1 MG/HR: 2 INJECTION INTRAMUSCULAR; INTRAVENOUS; SUBCUTANEOUS at 15:28

## 2020-12-27 RX ADMIN — HEPARIN SODIUM 5000 UNIT(S): 5000 INJECTION INTRAVENOUS; SUBCUTANEOUS at 05:29

## 2020-12-27 RX ADMIN — CASPOFUNGIN ACETATE 260 MILLIGRAM(S): 7 INJECTION, POWDER, LYOPHILIZED, FOR SOLUTION INTRAVENOUS at 13:55

## 2020-12-27 RX ADMIN — DEXMEDETOMIDINE HYDROCHLORIDE IN 0.9% SODIUM CHLORIDE 9.64 MICROGRAM(S)/KG/HR: 4 INJECTION INTRAVENOUS at 16:05

## 2020-12-27 RX ADMIN — Medication 12: at 00:02

## 2020-12-27 RX ADMIN — AMIODARONE HYDROCHLORIDE 200 MILLIGRAM(S): 400 TABLET ORAL at 05:29

## 2020-12-27 RX ADMIN — DEXMEDETOMIDINE HYDROCHLORIDE IN 0.9% SODIUM CHLORIDE 9.64 MICROGRAM(S)/KG/HR: 4 INJECTION INTRAVENOUS at 05:29

## 2020-12-27 RX ADMIN — HYDROMORPHONE HYDROCHLORIDE 1 MILLIGRAM(S): 2 INJECTION INTRAMUSCULAR; INTRAVENOUS; SUBCUTANEOUS at 15:50

## 2020-12-27 NOTE — PROGRESS NOTE ADULT - PROBLEM SELECTOR PLAN 4
Continue statin
remdesivir and decadron  as per ID.  Supportive care.
Continue statin
S/P remdesivir and on decadron  as per ID.  Supportive care.
S/P remdesivir and on decadron  as per ID.  Supportive care.

## 2020-12-27 NOTE — PROGRESS NOTE ADULT - ASSESSMENT
Patient is a 89 yo M with HTN DM HLD dementia (walks with cane) who p/w gen weakness x past several days. fever, recently diagnosed with COVID sent to hospital as family can no longer care for this patient at home - 12/14 first COVID+ PCR    RECOMMENDATIONS  12/14 NLR 3.47/0.46=7.5 97% on RA, would NOT start steroid as pt sats fine, rec LMWH prophylactic dose, with no hypoxemia and unclear duration rec NO remdesivir  blood with what appears to be a contaminant, urine with <100k enterococcus  12/15 agree with stopping steroids, continue LMWH   12/16 NLR 4/0.5=8 blood cultures with what appear to be contaminant no further Rx, on RA, continued fevers  12/17 now on NC-3.5L some concerns for secondary process will send off further testing, STEROIDs started  12/18 NC-3L, meeting criteria so REMDESIVIR started  12/19 NLR 8.92/0.65=13.7, NC 4L, continue remdesivir, steroids, lovenox, monitor CBC with diff, inflammatory markers  12/20 noted to be hypothermic today with rapid afiba and worsening CXR, transferred to ICU, placed on BIPAP. Lactic acid ~10, LFTs now elevated with AST >5x ULN and Cr 3.2 with GFR <30 - stopped remdesivir. Send for inflammatory markers and CBC with diff - will follow to evaluate for possible Tocilizumab. Send blood cultures. Start empirically on MEROPENEM . FPR 90669/14.02=3621, bandemia 19%, worsening CXR  12/21 NLR 8.09/0.38=21.3, prior enterococcus in urine on 12/14, rec sending sputum, urine, follow blood cultures, suspect secondary bacterial infection, send fungitell, aspergillus galactamanan  blood cultures NGTD  12/22 NLR 10.79/0.5=21 concern for secondary bacterial infection, send fungitell, aspergillus galactamanan  12/23 NLR 13.22/0.60=22, down to high flow NC, rising lactate, would add CASPOFUNGIN     FUNGITELL 187  12/24 on BIPAP, cont current Rx, Fungitell, Asp Ag pending  12/25 dropping wbc, nut NLR 11.3/0.3=38, with high fungitell continue current Rx  12/26 concerns with thrombocytopenia and COVID, caution with nay anticoagulation, meropenem day 7 so will look at 12/29 as possible stop date  12/27 continue current Rx, prognosis guarded

## 2020-12-27 NOTE — PROGRESS NOTE ADULT - PROBLEM SELECTOR PROBLEM 7
Hypokalemia
Renal failure
Hypokalemia
Renal failure
Renal failure
Hypokalemia

## 2020-12-27 NOTE — PROGRESS NOTE ADULT - SUBJECTIVE AND OBJECTIVE BOX
Date/Time Patient Seen:  		  Referring MD:   Data Reviewed	       Patient is a 88y old  Male who presents with a chief complaint of weakness, covid (26 Dec 2020 13:14)      Subjective/HPI     PAST MEDICAL & SURGICAL HISTORY:  Gout    Hyperlipidemia    Hypertension          Medication list         MEDICATIONS  (STANDING):  aMIOdarone    Tablet 200 milliGRAM(s) Oral daily  aMIOdarone Infusion 0.5 mG/Min (16.7 mL/Hr) IV Continuous <Continuous>  caspofungin IVPB      caspofungin IVPB 50 milliGRAM(s) IV Intermittent every 24 hours  dexMEDEtomidine Infusion 0.5 MICROgram(s)/kG/Hr (9.64 mL/Hr) IV Continuous <Continuous>  dextrose 40% Gel 15 Gram(s) Oral once  dextrose 5%. 1000 milliLiter(s) (50 mL/Hr) IV Continuous <Continuous>  dextrose 5%. 1000 milliLiter(s) (100 mL/Hr) IV Continuous <Continuous>  dextrose 50% Injectable 25 Gram(s) IV Push once  dextrose 50% Injectable 12.5 Gram(s) IV Push once  dextrose 50% Injectable 25 Gram(s) IV Push once  glucagon  Injectable 1 milliGRAM(s) IntraMuscular once  heparin   Injectable 5000 Unit(s) SubCutaneous every 8 hours  insulin glargine Injectable (LANTUS) 20 Unit(s) SubCutaneous at bedtime  insulin lispro (ADMELOG) corrective regimen sliding scale   SubCutaneous every 6 hours  meropenem  IVPB 500 milliGRAM(s) IV Intermittent every 12 hours  midodrine 20 milliGRAM(s) Oral every 8 hours  phenylephrine    Infusion 0.5 MICROgram(s)/kG/Min (14.5 mL/Hr) IV Continuous <Continuous>  polyethylene glycol 3350 17 Gram(s) Oral two times a day  senna 2 Tablet(s) Oral at bedtime    MEDICATIONS  (PRN):  ondansetron Injectable 4 milliGRAM(s) IV Push every 6 hours PRN Nausea and/or Vomiting         Vitals log        ICU Vital Signs Last 24 Hrs  T(C): 36.4 (27 Dec 2020 04:00), Max: 37.5 (26 Dec 2020 12:00)  T(F): 97.5 (27 Dec 2020 04:00), Max: 99.5 (26 Dec 2020 12:00)  HR: 60 (27 Dec 2020 05:51) (59 - 137)  BP: 120/65 (27 Dec 2020 05:30) (92/54 - 154/64)  BP(mean): 85 (27 Dec 2020 05:30) (67 - 102)  ABP: --  ABP(mean): --  RR: 34 (27 Dec 2020 05:30) (27 - 39)  SpO2: 97% (27 Dec 2020 05:51) (84% - 98%)           Input and Output:  I&O's Detail    26 Dec 2020 07:01  -  27 Dec 2020 07:00  --------------------------------------------------------  IN:    Amiodarone: 317.3 mL    Dexmedetomidine: 226.6 mL    Enteral Tube Flush: 250 mL    IV PiggyBack: 100 mL    IV PiggyBack: 250 mL    IV PiggyBack: 150 mL    Oral Fluid: 290 mL    Phenylephrine: 371.5 mL    Pivot 1.5: 1170 mL  Total IN: 3125.4 mL    OUT:    Indwelling Catheter - Urethral (mL): 1100 mL  Total OUT: 1100 mL    Total NET: 2025.4 mL          Lab Data                        11.5   x     )-----------( x        ( 27 Dec 2020 06:23 )             34.3     12-27    147<H>  |  114<H>  |  115<H>  ----------------------------<  351<H>  4.4   |  27  |  1.90<H>    Ca    7.4<L>      27 Dec 2020 06:23  Phos  3.2     12-27  Mg     2.8     12-27    TPro  4.9<L>  /  Alb  1.3<L>  /  TBili  1.5<H>  /  DBili  .90<H>  /  AST  46<H>  /  ALT  45  /  AlkPhos  250<H>  12-27            Review of Systems	      Objective     Physical Examination    heart s1s2  lung dec BS  head nc      Pertinent Lab findings & Imaging      Tariq:  NO   Adequate UO     I&O's Detail    26 Dec 2020 07:01  -  27 Dec 2020 07:00  --------------------------------------------------------  IN:    Amiodarone: 317.3 mL    Dexmedetomidine: 226.6 mL    Enteral Tube Flush: 250 mL    IV PiggyBack: 100 mL    IV PiggyBack: 250 mL    IV PiggyBack: 150 mL    Oral Fluid: 290 mL    Phenylephrine: 371.5 mL    Pivot 1.5: 1170 mL  Total IN: 3125.4 mL    OUT:    Indwelling Catheter - Urethral (mL): 1100 mL  Total OUT: 1100 mL    Total NET: 2025.4 mL               Discussed with:     Cultures:	        Radiology

## 2020-12-27 NOTE — PROGRESS NOTE ADULT - ASSESSMENT
AZALEA on CKD stage 3  COVID 19+  Metabolic Acidosis  Hypernatremia     -BLCR 1.4  -AZALEA 2/2 hypoxemic injury, AZALEA now improving   -Urine lytes reviewed  -UA 30 protein mod blood  -Add free water as per ICU  -Monitor urine output  -Strict &Os  -Pressors per ICU  -Patient DNR/DNI  -No acute indication for dialysis; poor candidate given age and comorbidities    d/w ICU

## 2020-12-27 NOTE — PROGRESS NOTE ADULT - PROBLEM SELECTOR PLAN 2
Continue atenolol  stable
Converted to NSR.   off medications now.   on Heparin for anticoagulation.
Cardizem drip for rate control.   Lovenox for anticoagulation.
Continue atenolol  labile
Continue atenolol  stable
Continue atenolol
Continue atenolol  stable
Converted to NSR.   off medications now.   on Heparin for anticoagulation.

## 2020-12-27 NOTE — DISCHARGE NOTE FOR THE EXPIRED PATIENT - HOSPITAL COURSE
87 y/o M w/ pmh of htn, dm, dementia, admitted to [facility] on 12/14/2020 for covid19, hospital courser c/b acute respiratory failure 2/2 to covid19 pna now decompensated w/ new uncontrolled afib, AZALEA, lactic acidosis, suspected superimposed pna, thrombocytopenia, GOC discussed by day team with family and decision was made for comfort measures, pt was started on dilaudid gtt and precedex was continued. Pt was found be flatted lined on the monitor at 21:12 I was called and informed and examined patient and pronounced at 21:29. I have called daughter Konstantin Cote at  and wife Susy Silva in person who is currently an patient in the hospital regarding expiration. All questions were answered condolences offered to family.    PE  General: unresponsive to verbal/tactile/painful stimuli  Eyes: Puplis fixed and dilated  Lungs: no breath sounds  Cardaic: no heart beat and flat on monitor  Ext: cold to touch, no cartoid/femoral/radial pulses

## 2020-12-27 NOTE — PROGRESS NOTE ADULT - SUBJECTIVE AND OBJECTIVE BOX
Patient is a 88y old  Male who presents with a chief complaint of weakness, covid (21 Dec 2020 09:18)       HPI:  87 yo M with HTN DM HLD dementia (walks with cane) who p/w gen weakness x past several days. Patient has not been eating or drinking for the past couple of days. He started having fevers and chills today 101.4. Patient's wife returned home from [facility] on 11/30. The daughter, who lives in the same house, works in a school. They all started to get sick after after wife got home on 11/30.   Pt was recently diagnosed with COVID as mult members in family have the same. Pt sent to hospital as famly can no longer care for this patient at home - pt was seen in ed yesterday for fall -- no acute findings -- and was sent home. Pt may have slid off chair today - no inj. Pt denies complaints. No abd pain. No acute dyspnea. No other acute co.  (14 Dec 2020 16:50)     No acute overnight events.     Seen in ICU    PAST MEDICAL & SURGICAL HISTORY:  Gout    Hyperlipidemia    Hypertension         FAMILY HISTORY:  NC    Social History:Non smoker    MEDICATIONS  (STANDING):  dexAMETHasone  Injectable 6 milliGRAM(s) IV Push daily  dextrose 40% Gel 15 Gram(s) Oral once  dextrose 5%. 1000 milliLiter(s) (50 mL/Hr) IV Continuous <Continuous>  dextrose 5%. 1000 milliLiter(s) (100 mL/Hr) IV Continuous <Continuous>  dextrose 50% Injectable 25 Gram(s) IV Push once  dextrose 50% Injectable 12.5 Gram(s) IV Push once  dextrose 50% Injectable 25 Gram(s) IV Push once  glucagon  Injectable 1 milliGRAM(s) IntraMuscular once  insulin lispro (ADMELOG) corrective regimen sliding scale   SubCutaneous every 6 hours  meropenem  IVPB 500 milliGRAM(s) IV Intermittent every 12 hours  phenylephrine    Infusion 0.5 MICROgram(s)/kG/Min (14.5 mL/Hr) IV Continuous <Continuous>  sodium bicarbonate  Infusion 0.195 mEq/kG/Hr (100 mL/Hr) IV Continuous <Continuous>    MEDICATIONS  (PRN):  ondansetron Injectable 4 milliGRAM(s) IV Push every 6 hours PRN Nausea and/or Vomiting   Meds reviewed    Allergies    penicillins (Unknown)  sulfa drugs (Unknown)    Intolerances         REVIEW OF SYSTEMS:    Limited due to respiratory status      ICU Vital Signs Last 24 Hrs  T(C): 36.5 (25 Dec 2020 12:00), Max: 36.8 (24 Dec 2020 23:33)  T(F): 97.7 (25 Dec 2020 12:00), Max: 98.2 (24 Dec 2020 23:33)  HR: 53 (25 Dec 2020 12:40) (51 - 114)  BP: 110/71 (25 Dec 2020 12:00) (87/61 - 152/89)  BP(mean): 86 (25 Dec 2020 12:00) (64 - 112)  ABP: --  ABP(mean): --  RR: 31 (25 Dec 2020 12:00) (26 - 42)  SpO2: 100% (25 Dec 2020 12:40) (74% - 100%)        PHYSICAL EXAM:    General: NAD  Deferred due to COVID 19 isolation and reduce transmission    LABS:  Reviewed

## 2020-12-27 NOTE — PROGRESS NOTE ADULT - PROBLEM SELECTOR PLAN 7
serial BMP  suplement potassium
Possible AZALEA on CKD.   Nephrology follow up.
Possible AZALEA on CKD.   Nephrology follow up.
serial BMP  suplement potassium
serial BMP  suplement potassium
Possible AZALEA on CKD.   Nephrology follow up.

## 2020-12-27 NOTE — PROGRESS NOTE ADULT - ASSESSMENT
2.17.2020 This is an 88 M with dementia comes with COVID PNA. Patient requires help with ADLs. He lives with daughter and wife. His daughter is his HCP. Daughter reports that both her parents have advanced directives that state that they are a DNR/DNI. She would like to complete MOLST. MOLST was reviewed, witnessed and signed. DNR/DNI order entered into EMR.    12.18.2020 Patient still febrile and requiring oxygen. Poor po intake. On remdisivir and decadron.    12.19.2020 Afebrile x 24 hours. Continues to require oxygen.    12.20.2020 Transferred to ICU with acute on chronic hypoxic respiratory failure secondary to COVID PNA. Patient currently on BIPAP. Overall prognosis poor. Daughter aware. Patient remains a DNR/DNI.    12.21.2020 Cr worsening. Not candidate for HD. Off BIPAP. Tolerating high-flow. Overall prognosis poor.    12.22.2020 Tolerating high flow. On iv abx for superimposed infection. Prognosis poor.    12.23.2020 On high flow. For CT C/A/P. On iv abx for bacteremia, superimposed infection. Prognosis poor. Patient DNR/DNI.     12.24.2020 CT noted. Spoke to daughter. No improvement in patient's overall condition and overall prognosis remains poor. She will consider comfort measures only. She would like to discuss with her family.    12.25.2020 No improvement. Family considering comfort care.    12.26.2020 Patient continues to deteriorate. Overall prognosis poor. Spoke to family -- they are likely going to opt for comfort care, however, they would like to discuss patient's clinical condition with ICU team as well. ICU team aware.     12.27.2020 No improvement. Continues to deteriorate. Overall prognosis grave. Family still undecided about comfort care.

## 2020-12-27 NOTE — PROGRESS NOTE ADULT - PROBLEM SELECTOR PROBLEM 4
Hyperlipidemia, unspecified hyperlipidemia type
COVID-19
Hyperlipidemia, unspecified hyperlipidemia type
Hyperlipidemia, unspecified hyperlipidemia type
COVID-19
COVID-19
Hyperlipidemia, unspecified hyperlipidemia type

## 2020-12-27 NOTE — PROGRESS NOTE ADULT - PROBLEM SELECTOR PLAN 6
? etiology  IV F PRN
Admelog coverage.

## 2020-12-27 NOTE — PROGRESS NOTE ADULT - PROBLEM SELECTOR PROBLEM 6
Hypotension
Hypotension
Type 2 diabetes mellitus with other specified complication, without long-term current use of insulin
Hypotension
Type 2 diabetes mellitus with other specified complication, without long-term current use of insulin
Type 2 diabetes mellitus with other specified complication, without long-term current use of insulin
Hypotension

## 2020-12-27 NOTE — PROGRESS NOTE ADULT - PROVIDER SPECIALTY LIST ADULT
Cardiology
Critical Care
Family Medicine
Infectious Disease
Internal Medicine
Internal Medicine
Palliative Care
Pulmonology
Pulmonology
Cardiology
Critical Care
Infectious Disease
Internal Medicine
Nephrology
Pulmonology
Pulmonology
Cardiology
Cardiology
Critical Care
Infectious Disease
Nephrology
Palliative Care
Palliative Care
Cardiology
Critical Care
Critical Care
Internal Medicine
Internal Medicine
Nephrology
Palliative Care
Palliative Care
Pulmonology
Cardiology
Cardiology
Infectious Disease
Palliative Care
Palliative Care
Pulmonology
Infectious Disease
Pulmonology
Infectious Disease
Infectious Disease
Pulmonology
Infectious Disease
Family Medicine
Hospitalist

## 2020-12-27 NOTE — PROGRESS NOTE ADULT - PROBLEM SELECTOR PLAN 9
rate control with amiodarone  AC with heparin

## 2020-12-27 NOTE — PROGRESS NOTE ADULT - PROBLEM SELECTOR PROBLEM 8
Respiratory failure
Respiratory failure
Prophylactic measure
Respiratory failure
Respiratory failure
Prophylactic measure
Prophylactic measure

## 2020-12-27 NOTE — PROGRESS NOTE ADULT - ATTENDING COMMENTS
guarded prognosis
87 yo man with Hx htn, DM, dementia with acute respiratory failure from COVID pneumonia, now decompensated with new uncontrolled afib, AZALEA, lactic acidosis, suspect superimposed pneumonia.      Persistent shock  Fungitell significantly elevated   Worsening thrombocytopenia   Sinus vanessa in 50s  Uncontrolled hyperglycemia     Wean Precedex  Wean Ladarius, continue midodrine   Continue BiPAP, will try HFNC   NGT feeds, add bowel regimen for constipation   UO good, Cr slightly elevated - monitor   Hold iv heparin, check HIT, hemolysis, DIC   Continue caspofungin, meropenem   D/c amio   Add Lantus 10 units   Continue other mx  Prognosis poor, patient is DNR, DNI, family contemplating withdrawal of life support     Remains critically ill, 42 mins spent
Patient seen and examined.  Discussed assessment and plan with resident.  Agree with above except where changed by me
Prognosis very guarded.   Patient is DNR/DNI
Jelena Regalado M.D.  [facility], Division of Infectious Diseases  [phone]  After 5pm on weekdays and all day on weekends - please call [phone]
87 yo man with Hx htn, DM, dementia with acute respiratory failure from COVID pneumonia, now decompensated with new uncontrolled afib, AZALEA, lactic acidosis, suspect superimposed pneumonia.      Awake, responsive, talks, moves all extremities  Abd exam with improving tenderness, softer today   Episode of A.fib with RVR in am today - converted with Amio     HFNC during the day, BiPAP at night   Shock - on Ladarius, start midodrine   UO stable, renal fn improving   Abd xray w/o acute pathology, having normal BMs, will start diet   Will consider getting CT if abd exam worsens   Acidosis resolved, off HCO3 drip, decrease LR to 50 ml/hr   Strep mitis bacteremia vs contaminant - on meropenem (didn't change to Zosyn due to allergy)   On IV heparin for A.fib   Dexamethasone x 10 days, s/p remdesivir   Start po diet, may need NGT if unable to swallow   Prognosis poor    Remains critically ill, 38 mins spent    Dr. Nicole updated family by phone
87 yo man with Hx htn, DM, dementia with acute respiratory failure from COVID pneumonia, now decompensated with new uncontrolled afib, AZALEA, lactic acidosis, suspect superimposed pneumonia.      Neuro status slightly better, answering a few questions today   A.fib with RVR in am today     Overall worsening     Didn't tolerate HFNC today - continue BiPAP    Shock - increasing Ladarius requirement    Stable renal fn, gave Lasix earlier   CT A/P w/o acute pathology - continue TF   Strep mitis bacteremia vs contaminant - on meropenem (didn't change to Zosyn due to allergy)   In view of worsening status will give a dose of vancomycin and check MRSA PCR   On caspofungin per ID, f/u fungitell  On IV heparin for A.fib   Dexamethasone x 10 days, s/p remdesivir   Prognosis poor, patient DNR, DNI, discussed withdrawal of life support and comfort care with family     Remains critically ill, 45 mins spent
87 yo man with Hx htn, DM, dementia with acute respiratory failure from COVID pneumonia, now decompensated with new uncontrolled afib, AZALEA, lactic acidosis, suspect superimposed pneumonia.      Status same to slightly worse today   A.fib with RVR in am today, now back on Amio   Resp status worse, had to place him on BiPAP   Remains on Precedex   Ladarius requirement improving, increase midodrine to 20 mg q8  Recent clinical deterioration likely due to fungal pneumonia in view of significantly elevated fungitell, will continue caspofungin   Meropenem until 12/29   Add free water for hypernatremia, increasing BUN  Cr and UO stable   HIT neg, will start sc heparin for DVT ppx, hold resuming iv heparin due to thrombocytopenia and bleeding risk   Hyperglycemic - increase qhs lantus to 20 units  Continue TF, having BMs  Prognosis poor, patient DNR, DNI, family contemplating comfort care   Family updated by Dr. Nicole     Remains critically ill, 40 mins spent
87 yo man with Hx htn, DM, dementia with acute respiratory failure from COVID pneumonia, now decompensated with new uncontrolled afib, AZALEA, lactic acidosis, suspect superimposed pneumonia.      Worsening status overall - tachypneic with Ve ~ 25, bipap dependent, thrombocytopenia  Family decided to place him on comfort measures  Started dilaudid drip, continued precedex and d/c'd bipap   D/c all non-comfort meds  Family and wife (patient upstairs) were able to visit    Critical care time of 35 mins spent
89 yo man with Hx htn, DM, dementia with acute respiratory failure from COVID pneumonia, now decompensated with new uncontrolled afib, AZALEA, lactic acidosis, suspect superimposed pneumonia.      Awake, responsive  Bedside POCUS with b-line pattern, trace b/l effusions, limited cardiac views but LV sys fn appeared normal     Tolerated HFNC all day today, looked comfortable   Use BiPAP at night   Shock - on Ladarius   UO improved throughout the day   Abd tenderness - xray w/o free air, LA improving   D/c HCO3 drip and start LR   Decrease meropenem to adjust for worsening AZALEA   Start IV heparin for A.fib   Dexamethasone x 10 days, s/p remdesivir   Will start po diet tomorrow if stable, having BMs  Prognosis poor    Remains critically ill, 43 mins spent
Jelena Regalado M.D.  [facility], Division of Infectious Diseases  [phone]  After 5pm on weekdays and all day on weekends - please call [phone]
89 yo man with Hx htn, DM, dementia with acute respiratory failure from COVID pneumonia, now decompensated with new uncontrolled afib, AZALEA, lactic acidosis, suspect superimposed pneumonia.      --likely has metabolic encephalopathy and delirium  --uncontrolled afib, dilt gtt added to amio gtt for better control  this afternoon developed bradycardia and both amio and dilt stopped  then developed hypotension and requiring low dose phenylephrine  --acute respiratory failure  continue BiPAP  --NPO   --AZALEA  acidosis from lactate and AZALEA, start bicarb gtt  place mueller  --COVID penumonia   continue dexamethasone  d/c remdesivir due to AZALEA  --suspect superimposed pneumonia with bandemia, lactate  empiric vanc x 1 and radha  check Cx data  --daughter updated, she understands pt's decompensation and that he is unlikely to survive  he is DNR/DNI  --pt critically ill, CC time 60min
89 yo man with Hx htn, DM, dementia with acute respiratory failure from COVID pneumonia, now decompensated with new uncontrolled afib, AZALEA, lactic acidosis, suspect superimposed pneumonia.      Neuro status unchanged, does not follow commands and resists during exam but moves everything   Abd exam with improving tenderness  Remains in NSR     HFNC during the day, BiPAP at night   Shock - wean Ladarius, continue midodrine   UO and renal fn stable  CT A/P w/o acute pathology - start TF   D/c IVF  Strep mitis bacteremia vs contaminant - on meropenem (didn't change to Zosyn due to allergy)   Add caspofungin per ID, f/u fungitell  On IV heparin for A.fib   Dexamethasone x 10 days, s/p remdesivir   Prognosis poor, patient DNR, DNI    Remains critically ill, 41 mins spent    Dr. Nicole updated family by phone
Prognosis very guarded. Patient is DNR/DNI
guarded prognosis
Prognosis very guarded. Patient is DNR/DNI

## 2020-12-27 NOTE — PROGRESS NOTE ADULT - PROBLEM SELECTOR PLAN 3
RISS  Check A1C  endo eval
RISS
Patient with pneumonia.   On IV antibiotics as per ID.   Follow culture data.   ID/Pulmonary follow up.
30 or less
RISS  Check A1C  endo eval
RISS
Patient with pneumonia.   On IV antibiotics as per ID.   Follow culture data.   ID/Pulmonary follow up.
RISS  Check A1C
RISS
Patient with pneumonia.   On IV antibiotics as per ID.   Follow culture data.   ID/Pulmonary follow up.
RISS  Check A1C  endo eval

## 2020-12-27 NOTE — PROGRESS NOTE ADULT - SUBJECTIVE AND OBJECTIVE BOX
Patient is a 88y old  Male who presents with a chief complaint of weakness, covid (26 Dec 2020 12:44)      INTERVAL /OVERNIGHT EVENTS: still on bipap    MEDICATIONS  (STANDING):  aMIOdarone    Tablet 200 milliGRAM(s) Oral daily  aMIOdarone Infusion 0.5 mG/Min (16.7 mL/Hr) IV Continuous <Continuous>  caspofungin IVPB      caspofungin IVPB 50 milliGRAM(s) IV Intermittent every 24 hours  dexMEDEtomidine Infusion 0.5 MICROgram(s)/kG/Hr (9.64 mL/Hr) IV Continuous <Continuous>  dextrose 40% Gel 15 Gram(s) Oral once  dextrose 5%. 1000 milliLiter(s) (50 mL/Hr) IV Continuous <Continuous>  dextrose 5%. 1000 milliLiter(s) (100 mL/Hr) IV Continuous <Continuous>  dextrose 50% Injectable 25 Gram(s) IV Push once  dextrose 50% Injectable 12.5 Gram(s) IV Push once  dextrose 50% Injectable 25 Gram(s) IV Push once  glucagon  Injectable 1 milliGRAM(s) IntraMuscular once  heparin   Injectable 5000 Unit(s) SubCutaneous every 8 hours  insulin glargine Injectable (LANTUS) 20 Unit(s) SubCutaneous at bedtime  insulin lispro (ADMELOG) corrective regimen sliding scale   SubCutaneous every 6 hours  meropenem  IVPB 500 milliGRAM(s) IV Intermittent every 12 hours  midodrine 20 milliGRAM(s) Oral every 8 hours  phenylephrine    Infusion 0.5 MICROgram(s)/kG/Min (14.5 mL/Hr) IV Continuous <Continuous>  polyethylene glycol 3350 17 Gram(s) Oral two times a day  senna 2 Tablet(s) Oral at bedtime    MEDICATIONS  (PRN):  ondansetron Injectable 4 milliGRAM(s) IV Push every 6 hours PRN Nausea and/or Vomiting      Allergies    penicillins (Unknown)  sulfa drugs (Unknown)    Intolerances        REVIEW OF SYSTEMS:  unable to obtain    Vital Signs Last 24 Hrs  T(C): 36.6 (26 Dec 2020 16:00), Max: 37.5 (26 Dec 2020 12:00)  T(F): 97.8 (26 Dec 2020 16:00), Max: 99.5 (26 Dec 2020 12:00)  HR: 80 (26 Dec 2020 20:14) (63 - 137)  BP: 106/62 (26 Dec 2020 19:00) (92/54 - 154/64)  BP(mean): 78 (26 Dec 2020 19:00) (67 - 100)  RR: 33 (26 Dec 2020 19:00) (21 - 39)  SpO2: 98% (26 Dec 2020 20:14) (84% - 100%)    PHYSICAL EXAM:  GENERAL: NAD, well-groomed, well-developed  HEAD:  Atraumatic, Normocephalic  EYES: EOMI, PERRLA, conjunctiva and sclera clear  ENMT: No tonsillar erythema, exudates, or enlargement; Moist mucous membranes, Good dentition, No lesions  NECK: Supple, No JVD, Normal thyroid  NERVOUS SYSTEM:  Alert & awake; Motor Strength 5/5 B/L upper and lower extremities; DTRs 2+ intact and symmetric  CHEST/LUNG: Clear to auscultation bilaterally; No rales, rhonchi, wheezing, or rubs  HEART: Regular rate and rhythm; No murmurs, rubs, or gallops  ABDOMEN: Soft, Nontender, Nondistended; Bowel sounds present  EXTREMITIES:  2+ Peripheral Pulses, No clubbing, cyanosis, or edema  LYMPH: No lymphadenopathy noted  SKIN: No rashes or lesions    LABS:                        12.7   13.54 )-----------( 29       ( 26 Dec 2020 06:49 )             37.9     26 Dec 2020 06:49    148    |  111    |  100    ----------------------------<  262    4.7     |  31     |  2.30     Ca    7.6        26 Dec 2020 06:49  Phos  3.6       26 Dec 2020 06:49  Mg     2.5       26 Dec 2020 06:49    TPro  5.5    /  Alb  1.5    /  TBili  1.5    /  DBili  .80    /  AST  26     /  ALT  30     /  AlkPhos  174    26 Dec 2020 06:49    PTT - ( 25 Dec 2020 05:47 )  PTT:47.4 sec    CAPILLARY BLOOD GLUCOSE      POCT Blood Glucose.: 436 mg/dL (26 Dec 2020 18:59)  POCT Blood Glucose.: 440 mg/dL (26 Dec 2020 18:27)  POCT Blood Glucose.: 444 mg/dL (26 Dec 2020 18:25)  POCT Blood Glucose.: 364 mg/dL (26 Dec 2020 12:33)  POCT Blood Glucose.: 314 mg/dL (26 Dec 2020 05:18)  POCT Blood Glucose.: 289 mg/dL (25 Dec 2020 23:10)      RADIOLOGY & ADDITIONAL TESTS:    Notes Reviewed:  [x ] YES  [ ] NO    Care Discussed with Consultants/Other Providers [x ] YES  [ ] NO Patient is a 88y old  Male who presents with a chief complaint of weakness, covid (26 Dec 2020 12:44)      INTERVAL /OVERNIGHT EVENTS: still on bipap    MEDICATIONS  (STANDING):  aMIOdarone    Tablet 200 milliGRAM(s) Oral daily  aMIOdarone Infusion 0.5 mG/Min (16.7 mL/Hr) IV Continuous <Continuous>  caspofungin IVPB      caspofungin IVPB 50 milliGRAM(s) IV Intermittent every 24 hours  dexMEDEtomidine Infusion 0.5 MICROgram(s)/kG/Hr (9.64 mL/Hr) IV Continuous <Continuous>  dextrose 40% Gel 15 Gram(s) Oral once  dextrose 5%. 1000 milliLiter(s) (50 mL/Hr) IV Continuous <Continuous>  dextrose 5%. 1000 milliLiter(s) (100 mL/Hr) IV Continuous <Continuous>  dextrose 50% Injectable 25 Gram(s) IV Push once  dextrose 50% Injectable 12.5 Gram(s) IV Push once  dextrose 50% Injectable 25 Gram(s) IV Push once  glucagon  Injectable 1 milliGRAM(s) IntraMuscular once  heparin   Injectable 5000 Unit(s) SubCutaneous every 8 hours  insulin glargine Injectable (LANTUS) 20 Unit(s) SubCutaneous at bedtime  insulin lispro (ADMELOG) corrective regimen sliding scale   SubCutaneous every 6 hours  meropenem  IVPB 500 milliGRAM(s) IV Intermittent every 12 hours  midodrine 20 milliGRAM(s) Oral every 8 hours  phenylephrine    Infusion 0.5 MICROgram(s)/kG/Min (14.5 mL/Hr) IV Continuous <Continuous>  polyethylene glycol 3350 17 Gram(s) Oral two times a day  senna 2 Tablet(s) Oral at bedtime    MEDICATIONS  (PRN):  ondansetron Injectable 4 milliGRAM(s) IV Push every 6 hours PRN Nausea and/or Vomiting      Allergies    penicillins (Unknown)  sulfa drugs (Unknown)    Intolerances        REVIEW OF SYSTEMS:  unable to obtain    Vital Signs Last 24 Hrs  T(C): 36.2 (27 Dec 2020 15:00), Max: 36.6 (27 Dec 2020 07:00)  T(F): 97.1 (27 Dec 2020 15:00), Max: 97.8 (27 Dec 2020 07:00)  HR: 0 (27 Dec 2020 21:28) (0 - 132)  BP: 38/22 (27 Dec 2020 21:00) (38/22 - 142/82)  BP(mean): 27 (27 Dec 2020 21:00) (27 - 102)  RR: 0 (27 Dec 2020 21:28) (0 - 44)  SpO2: 73% (27 Dec 2020 20:00) (73% - 99%)    PHYSICAL EXAM:  GENERAL: NAD, well-groomed, well-developed  HEAD:  Atraumatic, Normocephalic  EYES: EOMI, PERRLA, conjunctiva and sclera clear  ENMT: No tonsillar erythema, exudates, or enlargement; Moist mucous membranes, Good dentition, No lesions  NECK: Supple, No JVD, Normal thyroid  NERVOUS SYSTEM:  Alert & awake; Motor Strength 5/5 B/L upper and lower extremities; DTRs 2+ intact and symmetric  CHEST/LUNG: Clear to auscultation bilaterally; No rales, rhonchi, wheezing, or rubs  HEART: Regular rate and rhythm; No murmurs, rubs, or gallops  ABDOMEN: Soft, Nontender, Nondistended; Bowel sounds present  EXTREMITIES:  2+ Peripheral Pulses, No clubbing, cyanosis, or edema  LYMPH: No lymphadenopathy noted  SKIN: No rashes or lesions    LABS:                        11.5   13.18 )-----------( 13       ( 27 Dec 2020 06:23 )             34.3     27 Dec 2020 06:23    147    |  114    |  115    ----------------------------<  351    4.4     |  27     |  1.90     Ca    7.4        27 Dec 2020 06:23  Phos  3.2       27 Dec 2020 06:23  Mg     2.8       27 Dec 2020 06:23    TPro  4.9    /  Alb  1.3    /  TBili  1.5    /  DBili  .90    /  AST  46     /  ALT  45     /  AlkPhos  250    27 Dec 2020 06:23    LIVER FUNCTIONS - ( 27 Dec 2020 06:23 )  Alb: 1.3 g/dL / Pro: 4.9 g/dL / ALK PHOS: 250 U/L / ALT: 45 U/L / AST: 46 U/L / GGT: x             CAPILLARY BLOOD GLUCOSE      POCT Blood Glucose.: 331 mg/dL (27 Dec 2020 12:06)  POCT Blood Glucose.: 411 mg/dL (27 Dec 2020 12:02)  POCT Blood Glucose.: 334 mg/dL (27 Dec 2020 05:31)  POCT Blood Glucose.: 453 mg/dL (26 Dec 2020 23:57)                              12.7   13.54 )-----------( 29       ( 26 Dec 2020 06:49 )             37.9     26 Dec 2020 06:49    148    |  111    |  100    ----------------------------<  262    4.7     |  31     |  2.30     Ca    7.6        26 Dec 2020 06:49  Phos  3.6       26 Dec 2020 06:49  Mg     2.5       26 Dec 2020 06:49    TPro  5.5    /  Alb  1.5    /  TBili  1.5    /  DBili  .80    /  AST  26     /  ALT  30     /  AlkPhos  174    26 Dec 2020 06:49    PTT - ( 25 Dec 2020 05:47 )  PTT:47.4 sec    CAPILLARY BLOOD GLUCOSE      POCT Blood Glucose.: 436 mg/dL (26 Dec 2020 18:59)  POCT Blood Glucose.: 440 mg/dL (26 Dec 2020 18:27)  POCT Blood Glucose.: 444 mg/dL (26 Dec 2020 18:25)  POCT Blood Glucose.: 364 mg/dL (26 Dec 2020 12:33)  POCT Blood Glucose.: 314 mg/dL (26 Dec 2020 05:18)  POCT Blood Glucose.: 289 mg/dL (25 Dec 2020 23:10)      RADIOLOGY & ADDITIONAL TESTS:    Notes Reviewed:  [x ] YES  [ ] NO    Care Discussed with Consultants/Other Providers [x ] YES  [ ] NO

## 2020-12-27 NOTE — PROGRESS NOTE ADULT - SUBJECTIVE AND OBJECTIVE BOX
[facility] - Alethea Christian, Mary Anne, Santana, Rolly, Duy Regalado  Office Number:  [phone]    Patient resting comfortably in bed in NAD.  On bipap at current time. Overall clinical deterioration.    ROS: negative unless otherwise mentioned.    Telemetry:  sr, paf    MEDICATIONS  (STANDING):  aMIOdarone    Tablet 200 milliGRAM(s) Oral daily  aMIOdarone Infusion 0.5 mG/Min (16.7 mL/Hr) IV Continuous <Continuous>  caspofungin IVPB      caspofungin IVPB 50 milliGRAM(s) IV Intermittent every 24 hours  dexMEDEtomidine Infusion 0.5 MICROgram(s)/kG/Hr (9.64 mL/Hr) IV Continuous <Continuous>  dextrose 40% Gel 15 Gram(s) Oral once  dextrose 5%. 1000 milliLiter(s) (50 mL/Hr) IV Continuous <Continuous>  dextrose 5%. 1000 milliLiter(s) (100 mL/Hr) IV Continuous <Continuous>  dextrose 50% Injectable 25 Gram(s) IV Push once  dextrose 50% Injectable 12.5 Gram(s) IV Push once  dextrose 50% Injectable 25 Gram(s) IV Push once  glucagon  Injectable 1 milliGRAM(s) IntraMuscular once  heparin   Injectable 5000 Unit(s) SubCutaneous every 8 hours  insulin glargine Injectable (LANTUS) 20 Unit(s) SubCutaneous at bedtime  insulin lispro (ADMELOG) corrective regimen sliding scale   SubCutaneous every 6 hours  meropenem  IVPB 500 milliGRAM(s) IV Intermittent every 12 hours  midodrine 20 milliGRAM(s) Oral every 8 hours  phenylephrine    Infusion 0.5 MICROgram(s)/kG/Min (14.5 mL/Hr) IV Continuous <Continuous>  polyethylene glycol 3350 17 Gram(s) Oral two times a day  senna 2 Tablet(s) Oral at bedtime    MEDICATIONS  (PRN):  ondansetron Injectable 4 milliGRAM(s) IV Push every 6 hours PRN Nausea and/or Vomiting      Allergies    penicillins (Unknown)  sulfa drugs (Unknown)    Intolerances        Vital Signs Last 24 Hrs  T(C): 36.6 (27 Dec 2020 07:00), Max: 37.5 (26 Dec 2020 12:00)  T(F): 97.8 (27 Dec 2020 07:00), Max: 99.5 (26 Dec 2020 12:00)  HR: 68 (27 Dec 2020 09:00) (58 - 137)  BP: 107/77 (27 Dec 2020 07:00) (92/54 - 154/64)  BP(mean): 87 (27 Dec 2020 07:00) (67 - 102)  RR: 36 (27 Dec 2020 09:00) (27 - 39)  SpO2: 99% (27 Dec 2020 09:00) (84% - 99%)    I&O's Summary    26 Dec 2020 07:01  -  27 Dec 2020 07:00  --------------------------------------------------------  IN: 3294.2 mL / OUT: 1140 mL / NET: 2154.2 mL    27 Dec 2020 07:01  -  27 Dec 2020 09:31  --------------------------------------------------------  IN: 105.8 mL / OUT: 40 mL / NET: 65.8 mL        ON EXAM:    Constitutional: extubated, comfortable, on bipap  Eyes:  Pupils round, no lesions  Pulmonary: scattered rhonchi  Cardiovascular: PMI not palpable RRR normal S1 and S2, no murmurs, rubs, gallops or clicks  Gastrointestinal: Bowel Sounds present, soft, nontender.   Lymph: No cervical lymphadenopathy.  Neurological:  no focal deficits  Skin: No rashes.  No cyanosis.  Psych:  cannot assess   Ext: No lower ext edema    LABS: All Labs Reviewed:                        11.5   13.18 )-----------( x        ( 27 Dec 2020 06:23 )             34.3                         12.7   13.54 )-----------( 29       ( 26 Dec 2020 06:49 )             37.9                         12.6   12.81 )-----------( 32       ( 25 Dec 2020 14:16 )             37.2     27 Dec 2020 06:23    147    |  114    |  115    ----------------------------<  351    4.4     |  27     |  1.90   26 Dec 2020 06:49    148    |  111    |  100    ----------------------------<  262    4.7     |  31     |  2.30   25 Dec 2020 05:47    144    |  106    |  94     ----------------------------<  348    4.3     |  29     |  2.40     Ca    7.4        27 Dec 2020 06:23  Ca    7.6        26 Dec 2020 06:49  Ca    7.5        25 Dec 2020 05:47  Phos  3.2       27 Dec 2020 06:23  Phos  3.6       26 Dec 2020 06:49  Phos  2.9       25 Dec 2020 05:47  Mg     2.8       27 Dec 2020 06:23  Mg     2.5       26 Dec 2020 06:49  Mg     2.5       25 Dec 2020 05:47    TPro  4.9    /  Alb  1.3    /  TBili  1.5    /  DBili  .90    /  AST  46     /  ALT  45     /  AlkPhos  250    27 Dec 2020 06:23  TPro  5.5    /  Alb  1.5    /  TBili  1.5    /  DBili  .80    /  AST  26     /  ALT  30     /  AlkPhos  174    26 Dec 2020 06:49  TPro  5.0    /  Alb  1.3    /  TBili  1.4    /  DBili  .90    /  AST  30     /  ALT  32     /  AlkPhos  156    25 Dec 2020 05:47          Blood Culture:

## 2020-12-27 NOTE — PROGRESS NOTE ADULT - PROBLEM SELECTOR PLAN 1
87 yo M p/w gen weakness x past several days. Pt was recently diag with COVID as mult members in family have the same  on o2 support - Decadron - o2 titration - keep sat > 88 pct  ID eval noted  proBNP elevated - monitor for vol overload - will repeat - consideration for Diuresis - proBNP remains elevated and is higher -   monitor VS and HD and Sat  oral and skin care - assist with needs and ADL  isolation precs  tylenol and robitussin PRN   DM care  DVT p.
CT scan reviewed from 12 23 2020 -   89 yo M p/w gen weakness x past several days. Pt was recently diag with COVID as mult members in family have the same  on NIV - HF - fio2 titration and support in progress -   on Meropenem - anti fungal -   Renal Consult noted  s/p Decadron - o2 titration - keep sat > 88 pct  ID eval noted  AZALEA - vol overload - ckd - electrolyte imbalance -   monitor VS and HD and Sat  oral and skin care - assist with needs and ADL  isolation precs  tylenol and robitussin PRN   DM care  DVT p.   prognosis guarded to poor - pt is DNR.
as above
as above
fever noted  covid pcr pos on 12 18 2020  87 yo M p/w gen weakness x past several days. Pt was recently diag with COVID as mult members in family have the same  on o2 support - Decadron - o2 titration - keep sat > 88 pct  ID eval noted  proBNP elevated - monitor for vol overload - will repeat - consideration for consider - Diuresis - proBNP remains elevated and is higher -   monitor VS and HD and Sat  oral and skin care - assist with needs and ADL  isolation precs  tylenol and robitussin PRN   DM care  DVT p.
89 yo M p/w gen weakness x past several days. Pt was recently diag with COVID as mult members in family have the same  on NIV  on Meropenem  Renal Consult noted  Decadron - o2 titration - keep sat > 88 pct  ID eval noted  AZALEA - vol overload - ckd - electrolyte imbalance -   monitor VS and HD and Sat  oral and skin care - assist with needs and ADL  isolation precs  tylenol and robitussin PRN   DM care  DVT p.   prognosis guarded to poor - pt is DNR.
AF and Hypoxemia this am - desat episode noted -   87 yo M p/w gen weakness x past several days. Pt was recently diag with COVID as mult members in family have the same  on NIV - HF - fio2 titration and support in progress -   on Meropenem - anti fungal -   Renal Consult noted  s/p Decadron - o2 titration - keep sat > 88 pct  ID eval noted  AZALEA - vol overload - ckd - electrolyte imbalance -   monitor VS and HD and Sat  oral and skin care - assist with needs and ADL  isolation precs  tylenol and robitussin PRN   DM care  DVT p.   prognosis guarded to poor - pt is DNR.
as above
labs reviewed - vs noted -   87 yo M p/w gen weakness x past several days. Pt was recently dx with COVID as mult members in family have the same  on NIV - HF - fio2 titration and support in progress -   on Meropenem - anti fungal -   Renal Consult noted  s/p Decadron - o2 titration - keep sat > 88 pct  ID eval noted  AZALEA - vol overload - ckd - electrolyte imbalance - Cr better  monitor VS and HD and Sat  oral and skin care - assist with needs and ADL  isolation precs  tylenol and robitussin PRN   DM care  DVT p.   prognosis guarded to poor - pt is DNR.
as above
high risk period for decompensation EARLY INFLAMMATORY PHASE (7-14 days post symptom onset),    REMDESIVIR- 5 day course -consider within 10 days of symptom onset and prior to need for high flow oxygen or mechanical ventilation, Criteria for use include:• SpO2 < 94% on room air, or requiring supplemental oxygen • eGFR > 30 mL/min • ALT and AST < 5X ULN -weak evidence supporting minimal benefit  STEROIDs in general recommended AFTER 1st week of symptom onset for any patients that are hypoxic  and  with dexamethasone 6mg  Q-day x 10 days (equivalent to solumedrol 32mg IV or Prednisone 40mg)-higher doses to be considered in more severe disease,   ANTICOAGULATION with prophylactic dosing universally recommended LMWH 0.5mg/kg SQ BID (adj for low GFR) and then full dose to be considered in patients with increased risk for thromboembolic complications if bleeding risks are considered acceptable - track efficacy and periodic D-dimers from day of increased oxygen requirement and for high risk patients consider discharge on oral anticoagulation with rivaroxaban (Xarelto) 10mg PO QD or Eliquis (apixaban) 2.5-5mg PO BID  x 6 weeks, ASA in some contexts.   TOCILIZUMAB role still under investigation (limited benefit without pretreatment with steroids) but may be considered for patients progressing after start of steroids (criteria per Chris: Ferritin>700, D-dimer >1,000, CRP increase by >30%) w some evidence to suggest reduced progression to mechanical ventilation and shortening of hospital stay, caution with AST/ALT >1.5 ULN, would avoid without steroids    -daily, BMP, CBC w diff to follow NLR and in some contexts daily or periodic D-dimer levels, -other labs to risk stratify or with any decompensation  Procalcitonin,  Ferritin,  CRP, ESR, PT/PTT but limit excessive testing -antibiotics only if there is concern for a bacterial process (noted to be an issue in only a minority on presentation, (consider proning and tolerating lower oxygen saturation to avoid intubation).
s/p RRT - NIV fio2 support - transferred to ICU - on Amio for AF -   89 yo M p/w gen weakness x past several days. Pt was recently diag with COVID as mult members in family have the same   Decadron - o2 titration - keep sat > 88 pct  ID eval noted  proBNP elevated - consider PRN diuresis - I and O -   monitor VS and HD and Sat  oral and skin care - assist with needs and ADL  isolation precs  tylenol and robitussin PRN   DM care  DVT p.   prognosis guarded to poor - pt is DNR
87 yo M p/w gen weakness x past several days. Pt was recently diag with COVID as mult members in family have the same  on NIV - HF - fio2 titration and support in progress -   on Meropenem  Renal Consult noted  Decadron - o2 titration - keep sat > 88 pct  ID eval noted  AZALEA - vol overload - ckd - electrolyte imbalance -   monitor VS and HD and Sat  oral and skin care - assist with needs and ADL  isolation precs  tylenol and robitussin PRN   DM care  DVT p.   prognosis guarded to poor - pt is DNR.
89 yo M p/w gen weakness x past several days. Pt was recently diag with COVID as mult members in family have the same  on NIV  on Meropenem  Renal Consult noted  Decadron - o2 titration - keep sat > 88 pct  ID eval noted  AZALEA - vol overload - ckd - electrolyte imbalance -   monitor VS and HD and Sat  oral and skin care - assist with needs and ADL  isolation precs  tylenol and robitussin PRN   DM care  DVT p.   prognosis guarded to poor - pt is DNR.
89 yo M p/w gen weakness x past several days. Pt was recently diag with COVID as mult members in family have the same  on o2 support - Decadron - o2 titration - keep sat > 88 pct  ID eval noted  proBNP elevated - monitor for vol overload - will repeat - consideration for Diuresis  monitor VS and HD and Sat  oral and skin care - assist with needs and ADL  isolation precs  tylenol and robitussin PRN   DM care  DVT p.
Admit  continue decadron  Remdisivir/Toci as per ID  Oxygen support  Monitor COVID serologies  Airborne precautions  Supportive care  ID consult with Dr. JETT appreciated  Further work-up/management pending clinical course.
as above
high risk period for decompensation EARLY INFLAMMATORY PHASE (7-14 days post symptom onset),    REMDESIVIR- 5 day course -consider within 10 days of symptom onset and prior to need for high flow oxygen or mechanical ventilation, Criteria for use include:• SpO2 < 94% on room air, or requiring supplemental oxygen • eGFR > 30 mL/min • ALT and AST < 5X ULN -weak evidence supporting minimal benefit  STEROIDs in general recommended AFTER 1st week of symptom onset for any patients that are hypoxic  and  with dexamethasone 6mg  Q-day x 10 days (equivalent to solumedrol 32mg IV or Prednisone 40mg)-higher doses to be considered in more severe disease,   ANTICOAGULATION with prophylactic dosing universally recommended LMWH 0.5mg/kg SQ BID (adj for low GFR) and then full dose to be considered in patients with increased risk for thromboembolic complications if bleeding risks are considered acceptable - track efficacy and periodic D-dimers from day of increased oxygen requirement and for high risk patients consider discharge on oral anticoagulation with rivaroxaban (Xarelto) 10mg PO QD or Eliquis (apixaban) 2.5-5mg PO BID  x 6 weeks, ASA in some contexts.   TOCILIZUMAB role still under investigation (limited benefit without pretreatment with steroids) but may be considered for patients progressing after start of steroids (criteria per Chris: Ferritin>700, D-dimer >1,000, CRP increase by >30%) w some evidence to suggest reduced progression to mechanical ventilation and shortening of hospital stay, caution with AST/ALT >1.5 ULN, would avoid without steroids    -daily, BMP, CBC w diff to follow NLR and in some contexts daily or periodic D-dimer levels, -other labs to risk stratify or with any decompensation  Procalcitonin,  Ferritin,  CRP, ESR, PT/PTT but limit excessive testing -antibiotics only if there is concern for a bacterial process (noted to be an issue in only a minority on presentation, (consider proning and tolerating lower oxygen saturation to avoid intubation).
No Remdisivir/Toci because unclear when patient first diagnosed with COVID  Lovenox 40mg qd  Oxygen prn  Monitor COVID serologies  Airborne precautions  Supportive care  ID/pulm f/u  Further work-up/management pending clinical course.
Wean BiPAP as tolerated as per critical care.   HFNC in between.   Pulmonary/Critical care follow up.
as above
as above
remains on RA - vs noted - Febrile -   Blood Cx - noted - Gram positive cocci  89 yo M p/w gen weakness x past several days. Pt was recently diag with COVID as mult members in family have the same  on RA - decadron recommended for covid 19 with hypoxemia -   ID eval noted  proBNP elevated - monitor for vol overload  monitor VS and HD and Sat  oral and skin care - assist with needs and ADL  isolation precs  tylenol and robitussin PRN   DM care  DVT p.
CT scan reviewed from 12 23 2020 -   Anti Microbial regimen expanded - Anti Fungal and Broad Spectrum ABX  87 yo M p/w gen weakness x past several days. Pt was recently diag with COVID as mult members in family have the same  on NIV - HF - fio2 titration and support in progress -   on Meropenem  Renal Consult noted  Decadron - o2 titration - keep sat > 88 pct  ID eval noted  AZALEA - vol overload - ckd - electrolyte imbalance -   monitor VS and HD and Sat  oral and skin care - assist with needs and ADL  isolation precs  tylenol and robitussin PRN   DM care  DVT p.   prognosis guarded to poor - pt is DNR.
When pt is doing well and  past the critical second week when decompensation can occur and has started to improve, fine to consider discharge planning with for early but safe discharge. Discharge with oxygen (4L or less and able to keep sats>90) and even persistent fever at time of discharge is reasonable. With high risk for thromboembolic disease  consider dc on  rivaroxaban (Xarelto) or Eliquis (apixaban).  For outpt mild disease considered noninfectious at day 10 after onset of illness but for hospitalized patients day 20. If pt going to facility or to dc isolation in house then will require 2 negative PCR tests  by a minimum of 24 hours and fever free without antipyretics for >24hrs and more than 10 days from first positive test.
Admit  Start decadron  Remdisivir/Toci as per ID  Oxygen prn  Monitor COVID serologies  Airborne precautions  Supportive care  ID consult  Further work-up/management pending clinical course.
Admit  continue decadron  Remdisivir/Toci as per ID  Oxygen support  Monitor COVID serologies  Airborne precautions  Supportive care  ID consult with Dr. JETT  Further work-up/management pending clinical course.
No Remdisivir/Toci because unclear when patient first diagnosed with COVID  Lovenox 40mg qd  Oxygen prn  Monitor COVID serologies  Airborne precautions  Supportive care  ID/pulm f/u  Further work-up/management pending clinical course.
BiPAP as per intensivist.   Monitor pulse ox and ABG periodically as per critical care.   Further management as per patient's clinical course.
Admit  continue decadron  Remdisivir/Toci as per ID  Oxygen support  Monitor COVID serologies  Airborne precautions  Supportive care  ID consult with Dr. JETT  Further work-up/management pending clinical course.
No Remdisivir/Toci because unclear when patient first diagnosed with COVID  Lovenox 40mg qd  Oxygen prn  Monitor COVID serologies  Airborne precautions  Supportive care  ID/pulm f/u  Further work-up/management pending clinical course.
Admit  continue decadron  Remdisivir/Toci as per ID  Oxygen support  Monitor COVID serologies  Airborne precautions  Supportive care  ID consult with Dr. JETT appreciated  Further work-up/management pending clinical course.
Admit  continue decadron  Remdisivir/Toci as per ID  Oxygen support  Monitor COVID serologies  Airborne precautions  Supportive care  ID consult with Dr. JETT appreciated  Further work-up/management pending clinical course.
Wean BiPAP as tolerated as per critical care.   HFNC in between.   Pulmonary/Critical care follow up.

## 2020-12-27 NOTE — PROGRESS NOTE ADULT - PROBLEM SELECTOR PROBLEM 2
Essential hypertension
Atrial fibrillation
Atrial fibrillation
Essential hypertension
Atrial fibrillation

## 2020-12-27 NOTE — PROGRESS NOTE ADULT - ASSESSMENT
88M with PMHx HTN, DM, HLD, dementia who presented to the hospital with weakness and fever. Recently dx'd with COVID.  Admitted to medical service with COVID PNA. Receiving steroids and Remdesivir.   Now with AF RVR, transferred to ICU for hypotension and tachycardia.    AF:  - has gotten pushes of amio, now on iv amio gtt  - still with short bouts of paf  - off a/c in setting of thrombocytopenia    HF:  - unknown LV function  - defer diuresis at this point given intermittent hypotension and pressor requirements   - phenyleprhine as needed  - monitor renal function and electrolytes    COVID  - BiPAP and oxygen supplementation as needed  - pulm/ICU input appreciated    AZALEA  - Cr slightly improved though overall clinical deterioration  - strict I/Os, goal even to negative fluid balance,     - Monitor and replete lytes, keep K>4 and Mg >2  - Further cardiac workup will depend on clinical course.   - All other workup per primary team

## 2020-12-27 NOTE — PROGRESS NOTE ADULT - SUBJECTIVE AND OBJECTIVE BOX
Patient is a 88y old  Male who presents with a chief complaint of weakness, covid (27 Dec 2020 07:25)    24 hour events: ***    REVIEW OF SYSTEMS  Constitutional: No fever, chills, fatigue  Neuro: No headache, numbness, weakness  Resp: No cough, wheezing, shortness of breath  CVS: No chest pain, palpitations, leg swelling  GI: No abdominal pain, nausea, vomiting, diarrhea   : No dysuria, frequency, incontinence  Skin: No itching, burning, rashes, or lesions   Msk: No joint pain or swelling  Psych: No depression, anxiety, mood swings  Heme: No bleeding    T(F): 97.8 (12-27-20 @ 07:00), Max: 99.5 (12-26-20 @ 12:00)  HR: 79 (12-27-20 @ 07:00) (58 - 137)  BP: 107/77 (12-27-20 @ 07:00) (92/54 - 154/64)  RR: 29 (12-27-20 @ 07:00) (27 - 39)  SpO2: 94% (12-27-20 @ 07:00) (84% - 98%)  Wt(kg): --            I&O's Summary    12-26 @ 07:01  -  12-27 @ 07:00  --------------------------------------------------------  IN: 3125.4 mL / OUT: 1100 mL / NET: 2025.4 mL      PHYSICAL EXAM  General:   CNS:   HEENT:   Resp:   CVS:   Abd:   Ext:   Skin:     MEDICATIONS  caspofungin IVPB   caspofungin IVPB IV Intermittent  meropenem  IVPB IV Intermittent    aMIOdarone    Tablet Oral  aMIOdarone Infusion IV Continuous  midodrine Oral  phenylephrine    Infusion IV Continuous    dextrose 40% Gel Oral  dextrose 50% Injectable IV Push  dextrose 50% Injectable IV Push  dextrose 50% Injectable IV Push  glucagon  Injectable IntraMuscular  insulin glargine Injectable (LANTUS) SubCutaneous  insulin lispro (ADMELOG) corrective regimen sliding scale SubCutaneous      dexMEDEtomidine Infusion IV Continuous  ondansetron Injectable IV Push PRN      heparin   Injectable SubCutaneous    polyethylene glycol 3350 Oral  senna Oral      dextrose 5%. IV Continuous  dextrose 5%. IV Continuous                                11.5   x     )-----------( x        ( 27 Dec 2020 06:23 )             34.3       12-27    147<H>  |  114<H>  |  115<H>  ----------------------------<  351<H>  4.4   |  27  |  1.90<H>    Ca    7.4<L>      27 Dec 2020 06:23  Phos  3.2     12-27  Mg     2.8     12-27    TPro  4.9<L>  /  Alb  1.3<L>  /  TBili  1.5<H>  /  DBili  .90<H>  /  AST  46<H>  /  ALT  45  /  AlkPhos  250<H>  12-27                    Radiology: ***  Bedside lung ultrasound: ***  Bedside ECHO: ***    CENTRAL LINE: Y/N          DATE INSERTED:              REMOVE: Y/N  MADRIGAL: Y/N                        DATE INSERTED:              REMOVE: Y/N  A-LINE: Y/N                       DATE INSERTED:              REMOVE: Y/N    GLOBAL ISSUE/BEST PRACTICE  Analgesia:   Sedation:   CAM-ICU:   HOB elevation: yes  Stress ulcer prophylaxis:   VTE prophylaxis:   Glycemic control:   Nutrition:     CODE STATUS: ***  [facility] discussion: Y       Patient is a 88y old  Male who presents with a chief complaint of weakness, covid (27 Dec 2020 07:25)    24 hour events: No acute overnight events. Pt continues to have elevated FS, lantus increased overnight. Heparin re-added after HIT negative.     REVIEW OF SYSTEMS: Unable to assess due to clinical condition.     T(F): 97.8 (12-27-20 @ 07:00), Max: 99.5 (12-26-20 @ 12:00)  HR: 79 (12-27-20 @ 07:00) (58 - 137)  BP: 107/77 (12-27-20 @ 07:00) (92/54 - 154/64)  RR: 29 (12-27-20 @ 07:00) (27 - 39)  SpO2: 94% (12-27-20 @ 07:00) (84% - 98%)  Wt(kg): --            I&O's Summary    12-26 @ 07:01  -  12-27 @ 07:00  --------------------------------------------------------  IN: 3125.4 mL / OUT: 1100 mL / NET: 2025.4 mL      PHYSICAL EXAM  General: on bipap, NAD  CNS: awake but refuses to open eyes, does not follow commands, moves all extremities   Resp: clear mostly with scattered rhonchi  CVS: RRR, S1S2  Abd: soft, mild diffuse tenderness, +BS  Ext: no LE edema  Skin: warm     MEDICATIONS  caspofungin IVPB   caspofungin IVPB IV Intermittent  meropenem  IVPB IV Intermittent    aMIOdarone    Tablet Oral  aMIOdarone Infusion IV Continuous  midodrine Oral  phenylephrine    Infusion IV Continuous    dextrose 40% Gel Oral  dextrose 50% Injectable IV Push  dextrose 50% Injectable IV Push  dextrose 50% Injectable IV Push  glucagon  Injectable IntraMuscular  insulin glargine Injectable (LANTUS) SubCutaneous  insulin lispro (ADMELOG) corrective regimen sliding scale SubCutaneous      dexMEDEtomidine Infusion IV Continuous  ondansetron Injectable IV Push PRN      heparin   Injectable SubCutaneous    polyethylene glycol 3350 Oral  senna Oral      dextrose 5%. IV Continuous  dextrose 5%. IV Continuous                                11.5   x     )-----------( x        ( 27 Dec 2020 06:23 )             34.3       12-27    147<H>  |  114<H>  |  115<H>  ----------------------------<  351<H>  4.4   |  27  |  1.90<H>    Ca    7.4<L>      27 Dec 2020 06:23  Phos  3.2     12-27  Mg     2.8     12-27    TPro  4.9<L>  /  Alb  1.3<L>  /  TBili  1.5<H>  /  DBili  .90<H>  /  AST  46<H>  /  ALT  45  /  AlkPhos  250<H>  12-27      GLOBAL ISSUE/BEST PRACTICE  Analgesia: NA  Sedation: Y  CAM-ICU: UTO  HOB elevation: yes  Stress ulcer prophylaxis: NA  VTE prophylaxis: Y  Glycemic control: Y  Nutrition: Y    CODE STATUS: DNR, DNI  GOC discussion: Y

## 2020-12-27 NOTE — PROGRESS NOTE ADULT - PROBLEM SELECTOR PLAN 5
Continue allopurinol
on Midodrine and pressor as needed
Continue allopurinol
Continue allopurinol
on Midodrine and pressor as needed
Continue allopurinol
Continue allopurinol
possible early sepsis.   Monitor BP.
Continue allopurinol
Continue allopurinol

## 2020-12-27 NOTE — PROGRESS NOTE ADULT - SUBJECTIVE AND OBJECTIVE BOX
Patient is a 88y old  Male who presents with a chief complaint of weakness, covid (18 Dec 2020 10:34)      Subjective: Patient seen    PAIN: N  DYSPNEA: N	  NAUS/VOM: 	N  SECRETIONS: 	N  AGITATION: N    OTHER REVIEW OF SYSTEMS: negative    Vital Signs Last 24 Hrs  T(C): 36.6 (27 Dec 2020 07:00), Max: 37.5 (26 Dec 2020 12:00)  T(F): 97.8 (27 Dec 2020 07:00), Max: 99.5 (26 Dec 2020 12:00)  HR: 74 (27 Dec 2020 10:00) (58 - 137)  BP: 98/70 (27 Dec 2020 10:00) (92/54 - 154/64)  BP(mean): 80 (27 Dec 2020 10:00) (67 - 102)  RR: 36 (27 Dec 2020 10:00) (27 - 39)  SpO2: 97% (27 Dec 2020 10:00) (84% - 99%)    12-27    147<H>  |  114<H>  |  115<H>  ----------------------------<  351<H>  4.4   |  27  |  1.90<H>    Ca    7.4<L>      27 Dec 2020 06:23  Phos  3.2     12-27  Mg     2.8     12-27    TPro  4.9<L>  /  Alb  1.3<L>  /  TBili  1.5<H>  /  DBili  .90<H>  /  AST  46<H>  /  ALT  45  /  AlkPhos  250<H>  12-27                          11.5   13.18 )-----------( 13       ( 27 Dec 2020 06:23 )             34.3       CAPILLARY BLOOD GLUCOSE      POCT Blood Glucose.: 334 mg/dL (27 Dec 2020 05:31)  POCT Blood Glucose.: 453 mg/dL (26 Dec 2020 23:57)  POCT Blood Glucose.: 415 mg/dL (26 Dec 2020 21:53)  POCT Blood Glucose.: 436 mg/dL (26 Dec 2020 18:59)  POCT Blood Glucose.: 440 mg/dL (26 Dec 2020 18:27)  POCT Blood Glucose.: 444 mg/dL (26 Dec 2020 18:25)  POCT Blood Glucose.: 364 mg/dL (26 Dec 2020 12:33)              aMIOdarone    Tablet 200 milliGRAM(s) Oral daily  aMIOdarone Infusion 0.5 mG/Min IV Continuous <Continuous>  caspofungin IVPB      caspofungin IVPB 50 milliGRAM(s) IV Intermittent every 24 hours  dexMEDEtomidine Infusion 0.5 MICROgram(s)/kG/Hr IV Continuous <Continuous>  dextrose 40% Gel 15 Gram(s) Oral once  dextrose 5%. 1000 milliLiter(s) IV Continuous <Continuous>  dextrose 5%. 1000 milliLiter(s) IV Continuous <Continuous>  dextrose 50% Injectable 25 Gram(s) IV Push once  dextrose 50% Injectable 12.5 Gram(s) IV Push once  dextrose 50% Injectable 25 Gram(s) IV Push once  glucagon  Injectable 1 milliGRAM(s) IntraMuscular once  heparin   Injectable 5000 Unit(s) SubCutaneous every 8 hours  insulin glargine Injectable (LANTUS) 20 Unit(s) SubCutaneous at bedtime  insulin lispro (ADMELOG) corrective regimen sliding scale   SubCutaneous every 6 hours  meropenem  IVPB 500 milliGRAM(s) IV Intermittent every 12 hours  midodrine 20 milliGRAM(s) Oral every 8 hours  ondansetron Injectable 4 milliGRAM(s) IV Push every 6 hours PRN  phenylephrine    Infusion 0.5 MICROgram(s)/kG/Min IV Continuous <Continuous>  polyethylene glycol 3350 17 Gram(s) Oral two times a day  senna 2 Tablet(s) Oral at bedtime      GENERAL:  on high flow  HEENT:  NC/AT   NECK: supple no JVD  CVS:  +S1 S2 RRR  RESP: decreased bs  GI:  soft NT/ND +BS  : no suprapubic tenderness  MUSC:  no lower extremity edema  SKIN:  Warm, moist, no rashes   LYMPH: normal     MEDS REVIEWED	            ADVANCED DIRECTIVES:         DNR     DNI    MOLST    PSYCHOSOCIAL-SPIRITUAL ASSESSMENT:    _x__Reviewed     x___Care  plan unchanged     ___Care plan adjusted as below    GOALS OF CARE DISCUSSION  	x___Palliative care info/counseling provided	    _x__Family meeting  	_x__Advanced Directives addressed	    _x__See previous Palliative Medicine Note    AGENCY CHOICE DISCUSSED:   ___HOSPICE   ___CALVARY  ___OTHER:              > 50% OF THE TIME SPENT IN COUNSELING AND COORDINATING CARE 	    Minutes:      PROLONGED SERVICE             FACE TO FACE:    PT            PT & FAMILY	       Minutes:      Advance Care Planning Time:

## 2020-12-27 NOTE — PROGRESS NOTE ADULT - REASON FOR ADMISSION
weakness, covid

## 2020-12-27 NOTE — PROGRESS NOTE ADULT - PROBLEM SELECTOR PROBLEM 1
Pneumonia due to COVID-19 virus
COVID-19
COVID-19
Pneumonia due to COVID-19 virus
COVID-19
Pneumonia due to COVID-19 virus
COVID-19
COVID-19
Pneumonia due to COVID-19 virus
COVID-19
COVID-19
Pneumonia due to COVID-19 virus
Pneumonia due to COVID-19 virus
Respiratory failure
COVID-19
Pneumonia due to COVID-19 virus
Pneumonia due to COVID-19 virus
COVID-19
Respiratory failure
Respiratory failure

## 2020-12-27 NOTE — PROGRESS NOTE ADULT - PROBLEM SELECTOR PLAN 8
high flow per intensivist
On Heparin drip for  anticoagulation.
On Heparin drip for  anticoagulation.
On Lovenox for  anticoagulation.
high flow per intensivist

## 2020-12-27 NOTE — PROGRESS NOTE ADULT - ASSESSMENT
88M with PMHx HTN, DM, HLD, dementia who presented with weakness and fever. Recently dx'd with COVID.  Admitted to medical service with COVID PNA. Progression of hypoxemia requiring more O2, got a-fib with rvr and given lopressor and amio, had hypotension and tachycardia and RRT was called, pt started on IV amio and HFNC. Now alternating between bipap and HFNC.    #Neuro   - wean precedex as tolerated     #CV   - Pt given amio and Mg this AM for rapid a-fib and started on amio infusion. Returned to NSR.   - keep K~4 and Mg>2 for optimal arrhythmia suppression  - on luann for bp support, wean as tolerated. Will increase midodrine to 20 mg q8hr.   - dc'd heparin gtt due to worsening thrombocytopenia. Heparin induced platelet antibody negative.     Pulm  - on HFNC with use of bipap at night. Pt required being placed back on bipap this AM.   - s/p decadron  - off Remdesivir in setting of britt  - pt remains DNI             GI   - npo diet with tube feeds  - c/w PPI  - c/w senna and miralax for constipation  - CT abdomen shows small amount of ascites but no acute abdominal pathology    Renal   -Cr baseline 1.2-1.4, renal indices elevated but downtrending  - hypernatremia today, will start free water 250 cc q6hr  -avoid MAP<65, avoid nephrotoxins, strict I/Os, goal even to negative fluid balance, trend Cr    -mueller     Heme   -dc'd heparin gtt due to worsening thrombocytopenia  - heparin induced platelet antibody negative    ID   - Leukocytosis uptrending; Fungitell positive  - c/w meropenem and caspofungin  - CT chest and abdomen:  severe diffuse b/l airspace disease compatible w/ PNA. Small b/l pleural effusions. Small amount of ascites but no acute abdominal pathology.  - F/u aspergillus  - MRSA negative; BClx NGTD  - off remdesivir due to britt; s/p decadron     Endo   - C/w lantus 10U at bedtime and ISS coverage [id number] with mod dosing      COVID 19 specific considerations and therapeutic options based on the available and rapidly changing literature    MOLST in place and remains DNR/DNI.   88M with PMHx HTN, DM, HLD, dementia who presented with weakness and fever. Recently dx'd with COVID.  Admitted to medical service with COVID PNA. Progression of hypoxemia requiring more O2, got a-fib with rvr and given lopressor and amio, had hypotension and tachycardia and RRT was called, pt started on IV amio and HFNC. Now alternating between bipap and HFNC.    #Neuro   - wean precedex as tolerated     #CV   - C/w amio for a-fib  - keep K~4 and Mg>2 for optimal arrhythmia suppression  - on luann for bp support, wean as tolerated. On midodrine 20 mg q8hr.   - Heparin induced platelet antibody negative, heparin was restarted yesterday. However, given increased thrombocytopenia, will DC heparin    Pulm  - Increased bipap requirements during the day.   - s/p decadron  - off Remdesivir in setting of britt  - pt remains DNI             GI   - npo diet with tube feeds  - c/w PPI  - c/w senna and miralax for constipation  - CT abdomen shows small amount of ascites but no acute abdominal pathology    Renal   -Cr baseline 1.2-1.4, renal indices elevated but downtrending  - hypernatremia improving, c/w free water 250 cc q6hr  -avoid MAP<65, avoid nephrotoxins, strict I/Os, goal even to negative fluid balance, trend Cr    -mueller     Heme   -dc'd heparin gtt due to worsening thrombocytopenia  - heparin induced platelet antibody negative  - F/u DIC profile, LDH, haptoglobin     ID   - Leukocytosis uptrending; Fungitell positive  - c/w meropenem and caspofungin  - CT chest and abdomen:  severe diffuse b/l airspace disease compatible w/ PNA. Small b/l pleural effusions. Small amount of ascites but no acute abdominal pathology.  - F/u aspergillus  - MRSA negative; BClx NGTD  - off remdesivir due to britt; s/p decadron     Endo   - Lantus increased to 20U at bedtime  - C/w mod dosing ISS coverage q6hr  - Monitor FS    COVID 19 specific considerations and therapeutic options based on the available and rapidly changing literature    MOLST in place and remains DNR/DNI.

## 2020-12-27 NOTE — PROGRESS NOTE ADULT - SUBJECTIVE AND OBJECTIVE BOX
[facility] DIVISION of INFECTIOUS DISEASE  Denver Lew MD PhD, Maxine Rodriguez MD, Anahy Syed MD, Jelena Regalado MD  and providing coverage with Katherine Martinez MD and Mala Bray MD  Providing Infectious Disease Consultations at [facility], [facility], [facility]'s      Office# [phone] to schedule follow up appointments  Answering Service for urgent calls or New Consults [phone]  Cell# to text for urgent issues Denver Lew [phone]     Infectious diseases progress note:    SPENCER LEVY is a 88y y. o. Male patient    COVID Patient    Allergies    penicillins (Unknown)  sulfa drugs (Unknown)    Intolerances        ANTIBIOTICS/RELEVANT:  antimicrobials  caspofungin IVPB      caspofungin IVPB 50 milliGRAM(s) IV Intermittent every 24 hours  meropenem  IVPB 500 milliGRAM(s) IV Intermittent every 12 hours    immunologic:    OTHER:  aMIOdarone    Tablet 200 milliGRAM(s) Oral daily  aMIOdarone Infusion 0.5 mG/Min IV Continuous <Continuous>  dexMEDEtomidine Infusion 0.5 MICROgram(s)/kG/Hr IV Continuous <Continuous>  dextrose 40% Gel 15 Gram(s) Oral once  dextrose 5%. 1000 milliLiter(s) IV Continuous <Continuous>  dextrose 5%. 1000 milliLiter(s) IV Continuous <Continuous>  dextrose 50% Injectable 25 Gram(s) IV Push once  dextrose 50% Injectable 12.5 Gram(s) IV Push once  dextrose 50% Injectable 25 Gram(s) IV Push once  glucagon  Injectable 1 milliGRAM(s) IntraMuscular once  heparin   Injectable 5000 Unit(s) SubCutaneous every 8 hours  insulin glargine Injectable (LANTUS) 20 Unit(s) SubCutaneous at bedtime  insulin lispro (ADMELOG) corrective regimen sliding scale   SubCutaneous every 6 hours  midodrine 20 milliGRAM(s) Oral every 8 hours  ondansetron Injectable 4 milliGRAM(s) IV Push every 6 hours PRN  phenylephrine    Infusion 0.5 MICROgram(s)/kG/Min IV Continuous <Continuous>  polyethylene glycol 3350 17 Gram(s) Oral two times a day  senna 2 Tablet(s) Oral at bedtime      Objective:  Vital Signs Last 24 Hrs  T(C): 36.6 (27 Dec 2020 07:00), Max: 37.5 (26 Dec 2020 12:00)  T(F): 97.8 (27 Dec 2020 07:00), Max: 99.5 (26 Dec 2020 12:00)  HR: 74 (27 Dec 2020 10:00) (58 - 137)  BP: 98/70 (27 Dec 2020 10:00) (92/54 - 154/64)  BP(mean): 80 (27 Dec 2020 10:00) (67 - 102)  RR: 36 (27 Dec 2020 10:00) (27 - 39)  SpO2: 97% (27 Dec 2020 10:00) (84% - 99%)    T(C): 36.6 (12-27-20 @ 07:00), Max: 37.5 (12-26-20 @ 12:00)  T(C): 36.6 (12-27-20 @ 07:00), Max: 37.5 (12-26-20 @ 12:00)  T(C): 36.6 (12-27-20 @ 07:00), Max: 37.5 (12-26-20 @ 12:00)    PHYSICAL EXAM: back on BIPAP  HEENT: NC atraumatic  Neck: supple  Respiratory: no accessory muscle use, breathing comfortably  Cardiovascular: distant  Gastrointestinal: normal appearing, nondistended  Extremities: no clubbing, no cyanosis,      LABS:                          11.5   13.18 )-----------( 13       ( 27 Dec 2020 06:23 )             34.3       13.18 12-27 @ 06:23  13.54 12-26 @ 06:49  12.81 12-25 @ 14:16  12.18 12-25 @ 05:47  17.65 12-24 @ 06:08  14.36 12-23 @ 05:16  11.83 12-22 @ 05:51  11.93 12-22 @ 00:39  9.44 12-21 @ 05:41  9.32 12-20 @ 13:38      12-27    147<H>  |  114<H>  |  115<H>  ----------------------------<  351<H>  4.4   |  27  |  1.90<H>    Ca    7.4<L>      27 Dec 2020 06:23  Phos  3.2     12-27  Mg     2.8     12-27    TPro  4.9<L>  /  Alb  1.3<L>  /  TBili  1.5<H>  /  DBili  .90<H>  /  AST  46<H>  /  ALT  45  /  AlkPhos  250<H>  12-27      Creatinine, Serum: 1.90 mg/dL (12-27-20 @ 06:23)  Creatinine, Serum: 2.30 mg/dL (12-26-20 @ 06:49)  Creatinine, Serum: 2.40 mg/dL (12-25-20 @ 05:47)  Creatinine, Serum: 2.30 mg/dL (12-24-20 @ 06:08)  Creatinine, Serum: 3.40 mg/dL (12-23-20 @ 05:16)  Creatinine, Serum: 3.40 mg/dL (12-22-20 @ 05:51)  Creatinine, Serum: 3.70 mg/dL (12-21-20 @ 10:23)  Creatinine, Serum: 3.20 mg/dL (12-21-20 @ 05:42)                COVID RISK SCORE  Auto Neutrophil #: 12.49 K/uL (12-27-20 @ 06:23)  Auto Lymphocyte #: 0.36 K/uL (12-27-20 @ 06:23)  Auto Neutrophil #: 11.32 K/uL (12-25-20 @ 05:47)  Auto Lymphocyte #: 0.31 K/uL (12-25-20 @ 05:47)  Auto Neutrophil #: 16.15 K/uL (12-24-20 @ 06:08)  Auto Lymphocyte #: 0.69 K/uL (12-24-20 @ 06:08)  Auto Neutrophil #: 13.22 K/uL (12-23-20 @ 05:16)  Auto Lymphocyte #: 0.60 K/uL (12-23-20 @ 05:16)  Auto Neutrophil #: 10.79 K/uL (12-22-20 @ 05:51)  Auto Lymphocyte #: 0.51 K/uL (12-22-20 @ 05:51)  Auto Neutrophil #: 8.09 K/uL (12-21-20 @ 05:41)  Auto Lymphocyte #: 0.38 K/uL (12-21-20 @ 05:41)  Auto Neutrophil #: 8.02 K/uL (12-20-20 @ 13:38)  Auto Lymphocyte #: 0.84 K/uL (12-20-20 @ 13:38)  Auto Neutrophil #: 8.92 K/uL (12-19-20 @ 07:29)  Auto Lymphocyte #: 0.65 K/uL (12-19-20 @ 07:29)  Auto Neutrophil #: 7.76 K/uL (12-18-20 @ 07:31)  Auto Lymphocyte #: 0.29 K/uL (12-18-20 @ 07:31)  Auto Neutrophil #: 4.13 K/uL (12-16-20 @ 10:13)  Auto Lymphocyte #: 0.50 K/uL (12-16-20 @ 10:13)  Auto Neutrophil #: 2.56 K/uL (12-15-20 @ 06:00)  Auto Lymphocyte #: 0.57 K/uL (12-15-20 @ 06:00)  Auto Neutrophil #: 3.47 K/uL (12-14-20 @ 12:44)  Auto Lymphocyte #: 0.46 K/uL (12-14-20 @ 12:44)    Lactate, Blood: 4.8 mmol/L (12-23-20 @ 05:16)  Lactate, Blood: 4.0 mmol/L (12-22-20 @ 19:50)  Lactate, Blood: 7.2 mmol/L (12-21-20 @ 10:31)  Lactate, Blood: 10.0 mmol/L (12-20-20 @ 16:43)  Lactate, Blood: 10.7 mmol/L (12-20-20 @ 11:02)  Lactate, Blood: 1.1 mmol/L (12-15-20 @ 06:00)  Lactate, Blood: 2.2 mmol/L (12-14-20 @ 12:44)    Auto Eosinophil #: 0.00 K/uL (12-27-20 @ 06:23)  Auto Eosinophil #: 0.00 K/uL (12-25-20 @ 05:47)  Auto Eosinophil #: 0.00 K/uL (12-24-20 @ 06:08)  Auto Eosinophil #: 0.00 K/uL (12-23-20 @ 05:16)  Auto Eosinophil #: 0.00 K/uL (12-22-20 @ 05:51)  Auto Eosinophil #: 0.00 K/uL (12-21-20 @ 05:41)  Auto Eosinophil #: 0.00 K/uL (12-20-20 @ 13:38)  Auto Eosinophil #: 0.00 K/uL (12-19-20 @ 07:29)  Auto Eosinophil #: 0.00 K/uL (12-18-20 @ 07:31)  Auto Eosinophil #: 0.00 K/uL (12-16-20 @ 10:13)  Auto Eosinophil #: 0.00 K/uL (12-15-20 @ 06:00)  Auto Eosinophil #: 0.00 K/uL (12-14-20 @ 12:44)    Lactate Dehydrogenase, Serum: 839 U/L (12-25-20 @ 19:52)  Lactate Dehydrogenase, Serum: 882 U/L (12-20-20 @ 22:45)  Lactate Dehydrogenase, Serum: 426 U/L (12-16-20 @ 15:40)  Lactate Dehydrogenase, Serum: 265 U/L (12-15-20 @ 09:19)  Lactate Dehydrogenase, Serum: 285 U/L (12-14-20 @ 17:41)    Sedimentation Rate, Erythrocyte: 22 mm/hr (12-20-20 @ 01:24)  Sedimentation Rate, Erythrocyte: 25 mm/hr (12-15-20 @ 06:00)    Procalcitonin, Serum: 14.77 ng/mL (12-20-20 @ 16:43)  Procalcitonin, Serum: 1.54 ng/mL (12-20-20 @ 01:24)  Procalcitonin, Serum: 0.49 ng/mL (12-18-20 @ 07:31)  Procalcitonin, Serum: 0.13 ng/mL (12-15-20 @ 06:00)  Procalcitonin, Serum: 0.13 ng/mL (12-14-20 @ 12:44)    Troponin I, Serum: .015 ng/mL (12-14-20 @ 12:44)    Creatine Kinase, Serum: 298 U/L (12-20-20 @ 09:06)  Creatine Kinase, Serum: 253 U/L (12-15-20 @ 06:00)  Creatine Kinase, Serum: 273 U/L (12-14-20 @ 12:44)        Ferritin, Serum: 75514 ng/mL (12-20-20 @ 22:45)  Ferritin, Serum: 4391 ng/mL (12-20-20 @ 04:56)  Ferritin, Serum: 1748 ng/mL (12-18-20 @ 11:03)  Ferritin, Serum: 904 ng/mL (12-16-20 @ 15:28)  Ferritin, Serum: 664 ng/mL (12-15-20 @ 09:17)  Ferritin, Serum: 640 ng/mL (12-14-20 @ 17:41)        Activated Partial Thromboplastin Time: 47.4 sec (12-25-20 @ 05:47)  Activated Partial Thromboplastin Time: 60.5 sec (12-24-20 @ 06:08)  Activated Partial Thromboplastin Time: 66.1 sec (12-23-20 @ 13:32)  Activated Partial Thromboplastin Time: 85.7 sec (12-23-20 @ 05:16)  Activated Partial Thromboplastin Time: 199.9 sec (12-22-20 @ 19:50)  Activated Partial Thromboplastin Time: >200.0 sec (12-22-20 @ 09:26)  Activated Partial Thromboplastin Time: > 200 sec (12-22-20 @ 00:39)  INR: 1.45 ratio (12-21-20 @ 10:23)  Activated Partial Thromboplastin Time: 40.7 sec (12-21-20 @ 10:23)  INR: 1.08 ratio (12-20-20 @ 01:24)  Activated Partial Thromboplastin Time: 34.2 sec (12-20-20 @ 01:24)  Activated Partial Thromboplastin Time: 30.0 sec (12-14-20 @ 12:44)  INR: 1.06 ratio (12-14-20 @ 12:44)    D-Dimer Assay, Quantitative: 4881 ng/mL DDU (12-25-20 @ 14:16)  D-Dimer Assay, Quantitative: 2980 ng/mL DDU (12-20-20 @ 16:43)  D-Dimer Assay, Quantitative: 1198 ng/mL DDU (12-18-20 @ 07:31)  D-Dimer Assay, Quantitative: 1774 ng/mL DDU (12-16-20 @ 10:13)  D-Dimer Assay, Quantitative: 931 ng/mL DDU (12-15-20 @ 06:00)  D-Dimer Assay, Quantitative: 3136 ng/mL DDU (12-14-20 @ 12:44)        MICROBIOLOGY:              RADIOLOGY & ADDITIONAL STUDIES:

## 2020-12-28 LAB — SARS-COV-2 RNA SPEC QL NAA+PROBE: DETECTED

## 2020-12-29 LAB — GALACTOMANNAN AG SERPL-ACNC: <0.5 INDEX — SIGNIFICANT CHANGE UP

## 2021-06-03 PROCEDURE — 86022 PLATELET ANTIBODIES: CPT

## 2021-06-03 PROCEDURE — U0003: CPT

## 2021-06-03 PROCEDURE — 86850 RBC ANTIBODY SCREEN: CPT

## 2021-06-03 PROCEDURE — 86900 BLOOD TYPING SEROLOGIC ABO: CPT

## 2021-06-03 PROCEDURE — 80076 HEPATIC FUNCTION PANEL: CPT

## 2021-06-03 PROCEDURE — 84540 ASSAY OF URINE/UREA-N: CPT

## 2021-06-03 PROCEDURE — 84145 PROCALCITONIN (PCT): CPT

## 2021-06-03 PROCEDURE — 71250 CT THORAX DX C-: CPT

## 2021-06-03 PROCEDURE — 86769 SARS-COV-2 COVID-19 ANTIBODY: CPT

## 2021-06-03 PROCEDURE — 93005 ELECTROCARDIOGRAM TRACING: CPT

## 2021-06-03 PROCEDURE — 87635 SARS-COV-2 COVID-19 AMP PRB: CPT

## 2021-06-03 PROCEDURE — 87641 MR-STAPH DNA AMP PROBE: CPT

## 2021-06-03 PROCEDURE — 83880 ASSAY OF NATRIURETIC PEPTIDE: CPT

## 2021-06-03 PROCEDURE — 85025 COMPLETE CBC W/AUTO DIFF WBC: CPT

## 2021-06-03 PROCEDURE — 82962 GLUCOSE BLOOD TEST: CPT

## 2021-06-03 PROCEDURE — 99283 EMERGENCY DEPT VISIT LOW MDM: CPT

## 2021-06-03 PROCEDURE — 84443 ASSAY THYROID STIM HORMONE: CPT

## 2021-06-03 PROCEDURE — 74176 CT ABD & PELVIS W/O CONTRAST: CPT

## 2021-06-03 PROCEDURE — 87186 SC STD MICRODIL/AGAR DIL: CPT

## 2021-06-03 PROCEDURE — 80048 BASIC METABOLIC PNL TOTAL CA: CPT

## 2021-06-03 PROCEDURE — 87040 BLOOD CULTURE FOR BACTERIA: CPT

## 2021-06-03 PROCEDURE — 83010 ASSAY OF HAPTOGLOBIN QUANT: CPT

## 2021-06-03 PROCEDURE — 83615 LACTATE (LD) (LDH) ENZYME: CPT

## 2021-06-03 PROCEDURE — 87086 URINE CULTURE/COLONY COUNT: CPT

## 2021-06-03 PROCEDURE — 83735 ASSAY OF MAGNESIUM: CPT

## 2021-06-03 PROCEDURE — 82248 BILIRUBIN DIRECT: CPT

## 2021-06-03 PROCEDURE — 86901 BLOOD TYPING SEROLOGIC RH(D): CPT

## 2021-06-03 PROCEDURE — 87305 ASPERGILLUS AG IA: CPT

## 2021-06-03 PROCEDURE — 82550 ASSAY OF CK (CPK): CPT

## 2021-06-03 PROCEDURE — 99285 EMERGENCY DEPT VISIT HI MDM: CPT

## 2021-06-03 PROCEDURE — 83036 HEMOGLOBIN GLYCOSYLATED A1C: CPT

## 2021-06-03 PROCEDURE — 87449 NOS EACH ORGANISM AG IA: CPT

## 2021-06-03 PROCEDURE — 85384 FIBRINOGEN ACTIVITY: CPT

## 2021-06-03 PROCEDURE — 81001 URINALYSIS AUTO W/SCOPE: CPT

## 2021-06-03 PROCEDURE — 85652 RBC SED RATE AUTOMATED: CPT

## 2021-06-03 PROCEDURE — 85027 COMPLETE CBC AUTOMATED: CPT

## 2021-06-03 PROCEDURE — 83605 ASSAY OF LACTIC ACID: CPT

## 2021-06-03 PROCEDURE — 94660 CPAP INITIATION&MGMT: CPT

## 2021-06-03 PROCEDURE — 84100 ASSAY OF PHOSPHORUS: CPT

## 2021-06-03 PROCEDURE — 85379 FIBRIN DEGRADATION QUANT: CPT

## 2021-06-03 PROCEDURE — 74018 RADEX ABDOMEN 1 VIEW: CPT

## 2021-06-03 PROCEDURE — 84300 ASSAY OF URINE SODIUM: CPT

## 2021-06-03 PROCEDURE — 82565 ASSAY OF CREATININE: CPT

## 2021-06-03 PROCEDURE — 82803 BLOOD GASES ANY COMBINATION: CPT

## 2021-06-03 PROCEDURE — 82728 ASSAY OF FERRITIN: CPT

## 2021-06-03 PROCEDURE — 97161 PT EVAL LOW COMPLEX 20 MIN: CPT

## 2021-06-03 PROCEDURE — 93970 EXTREMITY STUDY: CPT

## 2021-06-03 PROCEDURE — 87640 STAPH A DNA AMP PROBE: CPT

## 2021-06-03 PROCEDURE — 80053 COMPREHEN METABOLIC PANEL: CPT

## 2021-06-03 PROCEDURE — 87150 DNA/RNA AMPLIFIED PROBE: CPT

## 2021-06-03 PROCEDURE — 94760 N-INVAS EAR/PLS OXIMETRY 1: CPT

## 2021-06-03 PROCEDURE — 82570 ASSAY OF URINE CREATININE: CPT

## 2021-06-03 PROCEDURE — 85730 THROMBOPLASTIN TIME PARTIAL: CPT

## 2021-06-03 PROCEDURE — 85610 PROTHROMBIN TIME: CPT

## 2021-06-03 PROCEDURE — 71045 X-RAY EXAM CHEST 1 VIEW: CPT

## 2021-06-03 PROCEDURE — 84484 ASSAY OF TROPONIN QUANT: CPT

## 2021-06-03 PROCEDURE — 36415 COLL VENOUS BLD VENIPUNCTURE: CPT

## 2021-06-03 PROCEDURE — 86140 C-REACTIVE PROTEIN: CPT

## 2021-07-29 NOTE — PROCEDURE NOTE - NSCOMPLICATION_GEN_A_CORE
MATERNAL FETAL MEDICINE   FOLLOW UP CONSULT      Name:  Judie Rivera  Date:  2021     Referring provider  Dmitri Cha MD  [address] [place] [address]    Reason for the visit   Judie is a pleasant 31 year old  who is currently at 10w5d by an established JACOB of 2021 who presents today for follow up MFM consultation.  She had a preconception consult performed in 2021 given a prior pregnancy complicated by cervical insufficiency, attempted cerclage placement at 21 weeks and PROM.      This is not an IVF pregnancy.  It is spontaneous with ovulation assistance given her history of PCOS.     She currently does not endorse symptoms of vaginal bleeding, uterine contractions or leakage of fluid.       Current pregnancy   JACOB: 2022  GA: 10w5d     High risk pregnancy concerns  1. History of cervical insufficiency  2. PCOS        I have reviewed this patient's previous OB ultrasound reports, pertinent prenatal labwork and testing provided by her referring OB provider.     OB History    Para Term  AB Living   3 1 0 1 1 0   SAB TAB Ectopic Molar Multiple Live Births   1 0 0 0 0 0      # Outcome Date GA Lbr Jesse/2nd Weight Sex Delivery Anes PTL Lv   3 Current            2  17 20w0d    Vag-Spont  Y       Complications: History of  premature rupture of membranes (PPROM), Short cervix   1 SAB 2016 18w0d    SPONTANEOUS  Y ND       Medications  Current Outpatient Medications   Medication Sig Dispense Refill   • aspirin 81 MG chewable tablet Chew 81 mg by mouth daily.     • Prenatal MV-Min-Fe Fum-FA-DHA (PRENATAL 1 PO) Take 1 tablet by mouth daily.     • metFORMIN (GLUCOPHAGE) 500 MG tablet Take 500 mg by mouth 3 times daily.     • Omega 3 1000 MG capsule Take 1,000 mg by mouth daily.     • Vitamin D, Cholecalciferol, 10 mcg (400 units) tablet Take 1,000 Units by mouth.     • vitamin E 100 units capsule Take 100 Units by mouth.       No current  facility-administered medications for this visit.     ?   Allergies   ALLERGIES:  No Known Allergies    Social history  Social History     Socioeconomic History   • Marital status: /Civil Union     Spouse name: olga    • Number of children: 0   • Years of education: Not on file   • Highest education level: Not on file   Occupational History   • Occupation: unemployed   Tobacco Use   • Smoking status: Former Smoker     Quit date:      Years since quittin.5   • Smokeless tobacco: Never Used   Vaping Use   • Vaping Use: never used   Substance and Sexual Activity   • Alcohol use: Not Currently   • Drug use: Never   • Sexual activity: Yes     Partners: Male   Other Topics Concern   • Not on file   Social History Narrative   • Not on file     Social Determinants of Health     Financial Resource Strain:    • Social Determinants: Financial Resource Strain:    Food Insecurity:    • Social Determinants: Food Insecurity:    Transportation Needs:    • Lack of Transportation (Medical):    • Lack of Transportation (Non-Medical):    Physical Activity:    • Days of Exercise per Week:    • Minutes of Exercise per Session:    Stress:    • Social Determinants: Stress:    Social Connections:    • Social Determinants: Social Connections:    Intimate Partner Violence:    • Social Determinants: Intimate Partner Violence Past Fear:    • Social Determinants: Intimate Partner Violence Current Fear:        Review of systems  all other systems negative unless noted in the HPI         Visit Vitals  /68   Ht 5' 6\" (1.676 m)   Wt 83.9 kg (185 lb)   BMI 29.86 kg/m²     Physical exam  General: Alert and oriented x3, pleasant   Abdomen: soft, gravid, nontender to palpation   Extremities: 2+ bilateral reflexes that are symmetric, no calf tenderness to palpation   Psychiatric: Mood and affect: Alert and awake, interactive and mood/affect were appropriate  Skin: no rashes  Pelvic Exam: deferred     Ultrasound results    Landeros  IUP with size consistent with dates and normal cardaic activity.    Discussion     I reviewed the US findings and the plan for care.  The patient's history is very suggestive of cervical insufficiency and we discussed the treatment option of vaginal cerclage placement.  The patient was given written information and the risks/benefits of this procedure were discussed with the patient.  Plan for this at 12-13 weeks.    Recommendations    1. Cerclage placement at 12-13 weeks  2. Follow up appointment in [facility] office in 4 weeks (2 weeks after cerclage placement)        Following this discussion, the patient's questions were answered and precautions reviewed.    Thank you for allowing me to participate in her care.  We remain available for any additional questions or concerns regarding her pregnancy management.     KASSANDRA Perez MD  Maternal Fetal Medicine    I have personally seen and evaluated this patient.  In total, I spent 20 minutes in the care of this patient,  with greater than 50% of this time was spent on face to face counseling and coordination for the above issues.          no complications

## 2021-08-27 NOTE — PROGRESS NOTE ADULT - PROBLEM SELECTOR PROBLEM 3
8/27/2021         RE: Dilia Rothman  [address] [facility] [address]        Dear Colleague,    Thank you for referring your patient, Dilia Rothman, to the [facility]. Please see a copy of my visit note below.    FOLLOWUP VISIT NOTE    PRESENTING COMPLAINT:  Followup visit, status post right nasal/facial/cheek changing mole excision done 08/13/2021.    HISTORY OF PRESENTING COMPLAINT:  Mr. Rothman is 31 years old, a couple of weeks out from surgery.  He has done well with no major issues.  His pathology showed it to be an intradermal melanocytic nevus.    PHYSICAL EXAMINATION:  Vital signs are stable.  He is afebrile, in no obvious distress.  Everything is healing in well.    ASSESSMENT AND PLAN:  Based upon the above findings, a diagnosis of a right cheek mole removal was made.  Sutures were removed.  I advised aggressive moisturization and sun protection.  I will see him back p.r.n.          Again, thank you for allowing me to participate in the care of your patient.        Sincerely,        SYLVIE oPrtillo MD    
Type 2 diabetes mellitus with other specified complication, without long-term current use of insulin
Pneumonia due to COVID-19 virus
Type 2 diabetes mellitus with other specified complication, without long-term current use of insulin
Pneumonia due to COVID-19 virus
Type 2 diabetes mellitus with other specified complication, without long-term current use of insulin
Pneumonia due to COVID-19 virus
Type 2 diabetes mellitus with other specified complication, without long-term current use of insulin

## 2022-02-18 NOTE — H&P ADULT - RESPIRATORY
glucose, glucagon (rDNA), dextrose, dextrose bolus (hypoglycemia) **OR** dextrose bolus (hypoglycemia), diphenhydrAMINE, HYDROcodone-acetaminophen, lactulose, midodrine, acetaminophen **OR** acetaminophen    History of Present Illness on 2/17/22:    79 y.o. yo female with PMH of ESRD on HD, chronic resp failure on trach/vent since 2/2021, HTN, COVID pneumonia, DVT on coumadin  who is admitted for loss of consciousness  Pt has been t/f to  [facility] from [place] [address] last year as she has been needing HD and is trach/vent  She usually cuts her HD session short and misses HD frequently. She says her hemorrhoids? give a lot of pain while laying on her bed during hD  Today she did dialysis for 1.5h and removed 300 ml only as she asked to come off HD. She became unresponsive when being t/f from HDU to the nursing home and had to bagged for few minutes before she regained consciousness. Apparently she had been on SBT for a week or so but currently is back on the vent. cxr shows chf and pneumonia    Physical exam:   Constitutional:  VITALS:  BP (!) 140/65   Pulse 87   Temp 99.2 °F (37.3 °C)   Resp 20   Ht 5' 4\" (1.626 m)   Wt (!) 316 lb 12.8 oz (143.7 kg)   SpO2 100%   BMI 54.38 kg/m²   Gen: alert, awake  Cardiovascular:  S1, S2 without m/r/g no lower extremity edema  Respiratory: decreased breath sounds ; trach in situ  Abdomen:  soft, nt, nd,   Neuro/Psy: AAoriented times 3 ; moves all 4 ext    Data/  Recent Labs     02/17/22  0725 02/18/22  0803   WBC 15.1* 9.4   HGB 9.7* 8.9*   HCT 31.8* 28.0*   MCV 95.0 94.4    250     Recent Labs     02/17/22  0725 02/18/22  0803    132*   K 4.7 4.8   CL 95* 93*   CO2 25 22   GLUCOSE 186* 106*   PHOS  --  5.6*   BUN 70* 79*   CREATININE 6.8* 7.8*   LABGLOM 6* 5*   GFRAA 7* 6*   Echocardiogram  Summary   Definity contrast administered. Left ventricular systolic function is mildly reduced with visually estimated   EF of 45%.    Endocardium not entirely well visualized but no obvious segmental wall   motion abnormalities. Diastolic dysfunction grade and filling pressure are indeterminate. Mild concentric left ventricular hypertrophy. Unable to obtain SPAP due to lack of tricuspid regurgitation. Valves are not well visualized but there are no obvious structural   abnormalities or color flow abnormalities seen on doppler. 9- [facility]. 60%    Assessment  -ESRD on HD TTS at [facility]   Has h/o intra dialytic hypotension and is on midodrine    -Acute on chronic resp failure likely from pneumonia and fluid overload   Currently trach and vent    -Anemia    -leukocytosis/sepsis    -CKD/MBD    -Rectal pain/hemorrhoids    Plan  -HD on 2/19 per TTS schedule  -resume epo 16k w hd and hecterol 6 mcg w HD   -pan cultures and broad spectrum abx as ordered  -renal dose meds  -will get gen surgery consult to evaluate for her rectal pain    Thank you for the consultation. Please do not hesitate to call with questions. Demarcus Dee MD  Office: [phone]  Fax:    [phone]  [facility] Breath Sounds equal & clear to percussion & auscultation, no accessory muscle use

## 2022-12-20 NOTE — PROGRESS NOTE ADULT - SUBJECTIVE AND OBJECTIVE BOX
Patient is a 88y old  Male who presents with a chief complaint of weakness, covid (23 Dec 2020 07:17)    24 hour events: ***    REVIEW OF SYSTEMS  Constitutional: No fever, chills, fatigue  Neuro: No headache, numbness, weakness  Resp: No cough, wheezing, shortness of breath  CVS: No chest pain, palpitations, leg swelling  GI: No abdominal pain, nausea, vomiting, diarrhea   : No dysuria, frequency, incontinence  Skin: No itching, burning, rashes, or lesions   Msk: No joint pain or swelling  Psych: No depression, anxiety, mood swings  Heme: No bleeding    T(F): 98.6 (20 @ 05:35), Max: 98.6 (20 @ 05:35)  HR: 75 (20 @ 07:00) (65 - 148)  BP: 109/77 (20 @ 07:00) (76/52 - 133/64)  RR: 35 (20 @ 07:00) (22 - 42)  SpO2: 98% (20 @ 07:00) (86% - 100%)  Wt(kg): --            I&O's Summary     @ 07:01  -   @ 07:00  --------------------------------------------------------  IN: 3017.8 mL / OUT: 880 mL / NET: 2137.8 mL      PHYSICAL EXAM  General:   CNS:   HEENT:   Resp:   CVS:   Abd:   Ext:   Skin:     MEDICATIONS  meropenem  IVPB IV Intermittent    midodrine Oral  phenylephrine    Infusion IV Continuous    dexAMETHasone  Injectable IV Push  dextrose 40% Gel Oral  dextrose 50% Injectable IV Push  dextrose 50% Injectable IV Push  dextrose 50% Injectable IV Push  glucagon  Injectable IntraMuscular  insulin lispro (ADMELOG) corrective regimen sliding scale SubCutaneous      ondansetron Injectable IV Push PRN      heparin   Injectable IV Push PRN  heparin   Injectable IV Push PRN  heparin  Infusion. IV Continuous        dextrose 5%. IV Continuous  dextrose 5%. IV Continuous  lactated ringers. IV Continuous                                12.5   14.36 )-----------( 137      ( 23 Dec 2020 05:16 )             35.5           142  |  102  |  95<H>  ----------------------------<  201<H>  4.4   |  30  |  3.40<H>    Ca    7.5<L>      23 Dec 2020 05:16  Phos  4.7       Mg     2.8         TPro  5.3<L>  /  Alb  1.6<L>  /  TBili  2.1<H>  /  DBili  1.50<H>  /  AST  56<H>  /  ALT  54  /  AlkPhos  134<H>      Lactate 4.8            @ 05:16    Lactate 4.0            @ 19:50          PT/INR - ( 21 Dec 2020 10:23 )   PT: 16.7 sec;   INR: 1.45 ratio         PTT - ( 23 Dec 2020 05:16 )  PTT:85.7 sec  Urinalysis Basic - ( 21 Dec 2020 16:13 )    Color: Yellow / Appearance: Slightly Turbid / S.010 / pH: x  Gluc: x / Ketone: Negative  / Bili: Negative / Urobili: Negative   Blood: x / Protein: Negative / Nitrite: Negative   Leuk Esterase: Negative / RBC: 6-10 /HPF / WBC 3-5   Sq Epi: x / Non Sq Epi: Occasional / Bacteria: Occasional      .Blood Blood   No growth to date. --  @ 00:30        Radiology: ***  Bedside lung ultrasound: ***  Bedside ECHO: ***    CENTRAL LINE: Y/N          DATE INSERTED:              REMOVE: Y/N  MADRIGAL: Y/N                        DATE INSERTED:              REMOVE: Y/N  A-LINE: Y/N                       DATE INSERTED:              REMOVE: Y/N    GLOBAL ISSUE/BEST PRACTICE  Analgesia:   Sedation:   CAM-ICU:   HOB elevation: yes  Stress ulcer prophylaxis:   VTE prophylaxis:   Glycemic control:   Nutrition:     CODE STATUS: ***  [facility] discussion: Y       Patient is a 88y old  Male who presents with a chief complaint of weakness, covid (23 Dec 2020 07:17)    24 hour events: No acute overnight events. Unable to get NG tube in yesterday but was able to today.    REVIEW OF SYSTEMS: Unable to assess due to clinical condition.    T(F): 98.6 (20 @ 05:35), Max: 98.6 (20 @ 05:35)  HR: 75 (20 @ 07:00) (65 - 148)  BP: 109/77 (20 @ 07:00) (76/52 - 133/64)  RR: 35 (20 @ 07:00) (22 - 42)  SpO2: 98% (20 @ 07:00) (86% - 100%)  Wt(kg): --            I&O's Summary     @ 07:01  -   @ 07:00  --------------------------------------------------------  IN: 3017.8 mL / OUT: 880 mL / NET: 2137.8 mL      PHYSICAL EXAM  GENERAL: on HFNC, NAD  NEURO: lethargic but minimally responds to questions, withdraws to pain in all 4 extremities, pupils symmetric  LUNGS: clear to auscultation, no intercostal retractions  HEART: S1/S2, regular rate and rhythm, no murmurs noted  GASTROINTESTINAL: +tenderness; soft, nondistended  INTEGUMENT: good skin turgor, warm skin, appears well perfused  EXTREMITIES: no edema, cyanosis, or clubbing     MEDICATIONS  meropenem  IVPB IV Intermittent    midodrine Oral  phenylephrine    Infusion IV Continuous    dexAMETHasone  Injectable IV Push  dextrose 40% Gel Oral  dextrose 50% Injectable IV Push  dextrose 50% Injectable IV Push  dextrose 50% Injectable IV Push  glucagon  Injectable IntraMuscular  insulin lispro (ADMELOG) corrective regimen sliding scale SubCutaneous      ondansetron Injectable IV Push PRN      heparin   Injectable IV Push PRN  heparin   Injectable IV Push PRN  heparin  Infusion. IV Continuous        dextrose 5%. IV Continuous  dextrose 5%. IV Continuous  lactated ringers. IV Continuous                                12.5   14.36 )-----------( 137      ( 23 Dec 2020 05:16 )             35.5           142  |  102  |  95<H>  ----------------------------<  201<H>  4.4   |  30  |  3.40<H>    Ca    7.5<L>      23 Dec 2020 05:16  Phos  4.7       Mg     2.8         TPro  5.3<L>  /  Alb  1.6<L>  /  TBili  2.1<H>  /  DBili  1.50<H>  /  AST  56<H>  /  ALT  54  /  AlkPhos  134<H>      Lactate 4.8            @ 05:16    Lactate 4.0            @ 19:50          PT/INR - ( 21 Dec 2020 10:23 )   PT: 16.7 sec;   INR: 1.45 ratio         PTT - ( 23 Dec 2020 05:16 )  PTT:85.7 sec  Urinalysis Basic - ( 21 Dec 2020 16:13 )    Color: Yellow / Appearance: Slightly Turbid / S.010 / pH: x  Gluc: x / Ketone: Negative  / Bili: Negative / Urobili: Negative   Blood: x / Protein: Negative / Nitrite: Negative   Leuk Esterase: Negative / RBC: 6-10 /HPF / WBC 3-5   Sq Epi: x / Non Sq Epi: Occasional / Bacteria: Occasional      .Blood Blood   No growth to date. --  @ 00:30        Radiology: ***  Bedside lung ultrasound: ***  Bedside ECHO: ***    CENTRAL LINE: Y/N          DATE INSERTED:              REMOVE: Y/N  MADRIGAL: Y/N                        DATE INSERTED:              REMOVE: Y/N  A-LINE: Y/N                       DATE INSERTED:              REMOVE: Y/N    GLOBAL ISSUE/BEST PRACTICE  Analgesia:   Sedation:   CAM-ICU:   HOB elevation: yes  Stress ulcer prophylaxis:   VTE prophylaxis:   Glycemic control:   Nutrition:     CODE STATUS: ***  [facility] discussion: Y       Patient is a 88y old  Male who presents with a chief complaint of weakness, covid (23 Dec 2020 07:17)    24 hour events: No acute overnight events. Unable to get NG tube in yesterday but was able to today.    REVIEW OF SYSTEMS: Unable to assess due to clinical condition.    T(F): 98.6 (20 @ 05:35), Max: 98.6 (20 @ 05:35)  HR: 75 (20 @ 07:00) (65 - 148)  BP: 109/77 (20 @ 07:00) (76/52 - 133/64)  RR: 35 (20 @ 07:00) (22 - 42)  SpO2: 98% (20 @ 07:00) (86% - 100%)  Wt(kg): --            I&O's Summary     @ 07:01  -   @ 07:00  --------------------------------------------------------  IN: 3017.8 mL / OUT: 880 mL / NET: 2137.8 mL      PHYSICAL EXAM  GENERAL: on HFNC, NAD  NEURO: lethargic but minimally responds to questions, withdraws to pain in all 4 extremities, pupils symmetric  LUNGS: clear to auscultation, no intercostal retractions  HEART: S1/S2, regular rate and rhythm, no murmurs noted  GASTROINTESTINAL: +tenderness; soft, nondistended  INTEGUMENT: good skin turgor, warm skin, appears well perfused  EXTREMITIES: no edema, cyanosis, or clubbing     MEDICATIONS  meropenem  IVPB IV Intermittent    midodrine Oral  phenylephrine    Infusion IV Continuous    dexAMETHasone  Injectable IV Push  dextrose 40% Gel Oral  dextrose 50% Injectable IV Push  dextrose 50% Injectable IV Push  dextrose 50% Injectable IV Push  glucagon  Injectable IntraMuscular  insulin lispro (ADMELOG) corrective regimen sliding scale SubCutaneous      ondansetron Injectable IV Push PRN      heparin   Injectable IV Push PRN  heparin   Injectable IV Push PRN  heparin  Infusion. IV Continuous        dextrose 5%. IV Continuous  dextrose 5%. IV Continuous  lactated ringers. IV Continuous                                12.5   14.36 )-----------( 137      ( 23 Dec 2020 05:16 )             35.5           142  |  102  |  95<H>  ----------------------------<  201<H>  4.4   |  30  |  3.40<H>    Ca    7.5<L>      23 Dec 2020 05:16  Phos  4.7       Mg     2.8         TPro  5.3<L>  /  Alb  1.6<L>  /  TBili  2.1<H>  /  DBili  1.50<H>  /  AST  56<H>  /  ALT  54  /  AlkPhos  134<H>      Lactate 4.8            @ 05:16    Lactate 4.0            @ 19:50          PT/INR - ( 21 Dec 2020 10:23 )   PT: 16.7 sec;   INR: 1.45 ratio         PTT - ( 23 Dec 2020 05:16 )  PTT:85.7 sec  Urinalysis Basic - ( 21 Dec 2020 16:13 )    Color: Yellow / Appearance: Slightly Turbid / S.010 / pH: x  Gluc: x / Ketone: Negative  / Bili: Negative / Urobili: Negative   Blood: x / Protein: Negative / Nitrite: Negative   Leuk Esterase: Negative / RBC: 6-10 /HPF / WBC 3-5   Sq Epi: x / Non Sq Epi: Occasional / Bacteria: Occasional      .Blood Blood   No growth to date. --  @ 00:30        Radiology:   < from: Xray Chest 1 View-PORTABLE IMMEDIATE (Xray Chest 1 View-PORTABLE IMMEDIATE .) (20 @ 09:34) >  INTERPRETATION:  AP chest on 2020 at 8:51 AM. 2 images. Patient is hypoxic and has Covid pneumonia. NG tube is place.    Heart magnified by technique.    Diffuse advanced bilateral infiltrates are again noted roughly similar to .    Present film shows an NG tube with tip barely entering stomach. The sidehole is in the lower esophagus.    IMPRESSION: NG tube position as above. Persistent advanced infiltrates.    < end of copied text >      CENTRAL LINE: N           MADRIGAL: Y                          DATE INSERTED: 20  A-LINE: N                           GLOBAL ISSUE/BEST PRACTICE  Analgesia: N  Sedation: N  HOB elevation: yes  Stress ulcer prophylaxis:   VTE prophylaxis: Y  Glycemic control: Y  Nutrition: NPO    CODE STATUS: DNR/DNI  GOC discussion: Y     Sub-acute Rehab

## 2023-05-30 NOTE — PROGRESS NOTE ADULT - SUBJECTIVE AND OBJECTIVE BOX
Patient is a 88y old  Male who presents with a chief complaint of weakness, covid (17 Dec 2020 14:09)      INTERVAL /OVERNIGHT EVENTS: no new complaints    MEDICATIONS  (STANDING):  allopurinol 100 milliGRAM(s) Oral daily  ATENolol  Tablet 25 milliGRAM(s) Oral daily  atorvastatin 10 milliGRAM(s) Oral at bedtime  dexAMETHasone     Tablet 6 milliGRAM(s) Oral daily  dextrose 40% Gel 15 Gram(s) Oral once  dextrose 5%. 1000 milliLiter(s) (50 mL/Hr) IV Continuous <Continuous>  dextrose 5%. 1000 milliLiter(s) (100 mL/Hr) IV Continuous <Continuous>  dextrose 50% Injectable 25 Gram(s) IV Push once  dextrose 50% Injectable 12.5 Gram(s) IV Push once  dextrose 50% Injectable 25 Gram(s) IV Push once  enoxaparin Injectable 40 milliGRAM(s) SubCutaneous daily  glucagon  Injectable 1 milliGRAM(s) IntraMuscular once  insulin lispro (ADMELOG) corrective regimen sliding scale   SubCutaneous three times a day before meals  insulin lispro (ADMELOG) corrective regimen sliding scale   SubCutaneous at bedtime  memantine 5 milliGRAM(s) Oral daily    MEDICATIONS  (PRN):  acetaminophen   Tablet .. 650 milliGRAM(s) Oral every 6 hours PRN Temp greater or equal to 38C (100.4F), Mild Pain (1 - 3)  ondansetron Injectable 4 milliGRAM(s) IV Push every 6 hours PRN Nausea and/or Vomiting      Allergies    penicillins (Unknown)  sulfa drugs (Unknown)    Intolerances        REVIEW OF SYSTEMS:  denies    Vital Signs Last 24 Hrs  T(C): 37.1 (17 Dec 2020 16:08), Max: 39.4 (16 Dec 2020 22:18)  T(F): 98.8 (17 Dec 2020 16:08), Max: 102.9 (16 Dec 2020 22:18)  HR: 101 (17 Dec 2020 16:08) (94 - 123)  BP: 103/68 (17 Dec 2020 16:08) (76/40 - 158/82)  BP(mean): --  RR: 19 (17 Dec 2020 16:08) (16 - 20)  SpO2: 90% (17 Dec 2020 16:08) (90% - 95%)    PHYSICAL EXAM:  GENERAL: NAD, well-groomed, well-developed  HEAD:  Atraumatic, Normocephalic  EYES: EOMI, PERRLA, conjunctiva and sclera clear  ENMT: No tonsillar erythema, exudates, or enlargement; Moist mucous membranes, Good dentition, No lesions  NECK: Supple, No JVD, Normal thyroid  NERVOUS SYSTEM:  Alert & awake; Motor Strength 5/5 B/L upper and lower extremities; DTRs 2+ intact and symmetric  CHEST/LUNG: Clear to auscultation bilaterally; No rales, rhonchi, wheezing, or rubs  HEART: Regular rate and rhythm; No murmurs, rubs, or gallops  ABDOMEN: Soft, Nontender, Nondistended; Bowel sounds present  EXTREMITIES:  2+ Peripheral Pulses, No clubbing, cyanosis, or edema  LYMPH: No lymphadenopathy noted  SKIN: No rashes or lesions    LABS:      Ca    7.8        16 Dec 2020 10:13          CAPILLARY BLOOD GLUCOSE      POCT Blood Glucose.: 189 mg/dL (17 Dec 2020 16:36)  POCT Blood Glucose.: 195 mg/dL (17 Dec 2020 12:42)  POCT Blood Glucose.: 143 mg/dL (17 Dec 2020 08:33)  POCT Blood Glucose.: 149 mg/dL (16 Dec 2020 22:00)      RADIOLOGY & ADDITIONAL TESTS:    Notes Reviewed:  [x ] YES  [ ] NO    Care Discussed with Consultants/Other Providers [x ] YES  [ ] NO Valtrex Pregnancy And Lactation Text: this medication is Pregnancy Category B and is considered safe during pregnancy. This medication is not directly found in breast milk but it's metabolite acyclovir is present.

## 2023-08-23 NOTE — DIETITIAN INITIAL EVALUATION ADULT. - REASON INDICATOR FOR ASSESSMENT
Caller: Boubacar Wiley    Relationship: Emergency Contact    Best call back number: [phone]    What was the call regarding: STATED THAT THE PT HAS NOT BEEN FEELING WELL. IS CONGESTED AND HAS SINUS PRESSURE.     WOULD LIKE TO KNOW WHAT THEY NEED TO DO.    decreased intake

## 2024-02-22 NOTE — PATIENT PROFILE ADULT - FUNCTIONAL SCREEN CURRENT LEVEL: SWALLOWING (IF SCORE 2 OR MORE FOR ANY ITEM, CONSULT REHAB SERVICES), MLM)
Detail Level: Simple
Price (Do Not Change): 0.00
Instructions: This plan will send the code FBSE to the PM system.  DO NOT or CHANGE the price.
0 = swallows foods/liquids without difficulty

## 2024-05-31 NOTE — DIETITIAN INITIAL EVALUATION ADULT. - PROBLEM SELECTOR PROBLEM 3
Problem: Safety - Adult  Goal: Free from fall injury  5/31/2024 0347 by Haider Naqvi RN  Outcome: Progressing  5/30/2024 1519 by Abena Polanco LPN  Outcome: Progressing  Note: Call light and bedside table within reach. Patient uses call light appropriately. Free from falls at this time.     Problem: Skin/Tissue Integrity  Goal: Absence of new skin breakdown  Description: 1.  Monitor for areas of redness and/or skin breakdown  2.  Assess vascular access sites hourly  3.  Every 4-6 hours minimum:  Change oxygen saturation probe site  4.  Every 4-6 hours:  If on nasal continuous positive airway pressure, respiratory therapy assess nares and determine need for appliance change or resting period.  5/30/2024 1519 by Abena Polanco LPN  Outcome: Not Progressing  Note: Patient has open surgical wound to left inner thigh. Tx in place.      Type 2 diabetes mellitus with other specified complication, without long-term current use of insulin

## 2024-12-17 NOTE — PROGRESS NOTE ADULT - SUBJECTIVE AND OBJECTIVE BOX
Patient was exposed to RSV by a coworker who tested positive.  RSV PCR obtained.  Management to be managed pending PCR results   Date/Time Patient Seen:  		  Referring MD:   Data Reviewed	       Patient is a 88y old  Male who presents with a chief complaint of weakness, covid (16 Dec 2020 17:46)      Subjective/HPI     PAST MEDICAL & SURGICAL HISTORY:  Gout    Hyperlipidemia    Hypertension          Medication list         MEDICATIONS  (STANDING):  allopurinol 100 milliGRAM(s) Oral daily  ATENolol  Tablet 25 milliGRAM(s) Oral daily  atorvastatin 10 milliGRAM(s) Oral at bedtime  dextrose 40% Gel 15 Gram(s) Oral once  dextrose 5%. 1000 milliLiter(s) (50 mL/Hr) IV Continuous <Continuous>  dextrose 5%. 1000 milliLiter(s) (100 mL/Hr) IV Continuous <Continuous>  dextrose 50% Injectable 25 Gram(s) IV Push once  dextrose 50% Injectable 12.5 Gram(s) IV Push once  dextrose 50% Injectable 25 Gram(s) IV Push once  enoxaparin Injectable 40 milliGRAM(s) SubCutaneous daily  glucagon  Injectable 1 milliGRAM(s) IntraMuscular once  insulin lispro (ADMELOG) corrective regimen sliding scale   SubCutaneous three times a day before meals  insulin lispro (ADMELOG) corrective regimen sliding scale   SubCutaneous at bedtime  memantine 5 milliGRAM(s) Oral daily    MEDICATIONS  (PRN):  acetaminophen   Tablet .. 650 milliGRAM(s) Oral every 6 hours PRN Temp greater or equal to 38C (100.4F), Mild Pain (1 - 3)  ondansetron Injectable 4 milliGRAM(s) IV Push every 6 hours PRN Nausea and/or Vomiting         Vitals log        ICU Vital Signs Last 24 Hrs  T(C): 36.8 (17 Dec 2020 07:39), Max: 39.4 (16 Dec 2020 22:18)  T(F): 98.2 (17 Dec 2020 07:39), Max: 102.9 (16 Dec 2020 22:18)  HR: 111 (17 Dec 2020 07:39) (73 - 123)  BP: 158/82 (17 Dec 2020 07:39) (76/40 - 158/82)  BP(mean): --  ABP: --  ABP(mean): --  RR: 20 (17 Dec 2020 07:39) (16 - 20)  SpO2: 94% (17 Dec 2020 07:39) (91% - 95%)           Input and Output:  I&O's Detail    16 Dec 2020 07:01  -  17 Dec 2020 07:00  --------------------------------------------------------  IN:    sodium chloride 0.9%: 200 mL    Sodium Chloride 0.9% Bolus: 500 mL  Total IN: 700 mL    OUT:    Voided (mL): 1600 mL  Total OUT: 1600 mL    Total NET: -900 mL          Lab Data                        12.3   4.87  )-----------( 94       ( 16 Dec 2020 10:13 )             34.6     12-16    137  |  105  |  27<H>  ----------------------------<  147<H>  3.7   |  23  |  1.30    Ca    7.8<L>      16 Dec 2020 10:13  Mg     1.8     12-16    TPro  5.9<L>  /  Alb  2.4<L>  /  TBili  1.2  /  DBili  x   /  AST  33  /  ALT  24  /  AlkPhos  108  12-16            Review of Systems	      Objective     Physical Examination  heart s1s2  lung dec BS  abd soft        Pertinent Lab findings & Imaging      Tariq:  NO   Adequate UO     I&O's Detail    16 Dec 2020 07:01  -  17 Dec 2020 07:00  --------------------------------------------------------  IN:    sodium chloride 0.9%: 200 mL    Sodium Chloride 0.9% Bolus: 500 mL  Total IN: 700 mL    OUT:    Voided (mL): 1600 mL  Total OUT: 1600 mL    Total NET: -900 mL               Discussed with:     Cultures:	        Radiology